# Patient Record
Sex: FEMALE | Race: WHITE
[De-identification: names, ages, dates, MRNs, and addresses within clinical notes are randomized per-mention and may not be internally consistent; named-entity substitution may affect disease eponyms.]

---

## 2018-12-23 ENCOUNTER — HOSPITAL ENCOUNTER (OUTPATIENT)
Dept: HOSPITAL 41 - JD.ED | Age: 83
Setting detail: OBSERVATION
LOS: 4 days | Discharge: NURSING, REHAB OR LONG-TERM CARE | End: 2018-12-27
Payer: MEDICARE

## 2018-12-23 DIAGNOSIS — K59.00: ICD-10-CM

## 2018-12-23 DIAGNOSIS — H54.7: ICD-10-CM

## 2018-12-23 DIAGNOSIS — I48.0: ICD-10-CM

## 2018-12-23 DIAGNOSIS — E78.5: ICD-10-CM

## 2018-12-23 DIAGNOSIS — N18.3: ICD-10-CM

## 2018-12-23 DIAGNOSIS — M54.9: ICD-10-CM

## 2018-12-23 DIAGNOSIS — T46.0X1A: Primary | ICD-10-CM

## 2018-12-23 DIAGNOSIS — R42: ICD-10-CM

## 2018-12-23 DIAGNOSIS — G89.29: ICD-10-CM

## 2018-12-23 DIAGNOSIS — E66.9: ICD-10-CM

## 2018-12-23 DIAGNOSIS — D64.89: ICD-10-CM

## 2018-12-23 DIAGNOSIS — R11.0: ICD-10-CM

## 2018-12-23 DIAGNOSIS — R53.1: ICD-10-CM

## 2018-12-23 DIAGNOSIS — Z79.82: ICD-10-CM

## 2018-12-23 DIAGNOSIS — N28.9: ICD-10-CM

## 2018-12-23 DIAGNOSIS — Z79.899: ICD-10-CM

## 2018-12-23 DIAGNOSIS — I13.0: ICD-10-CM

## 2018-12-23 DIAGNOSIS — Z79.01: ICD-10-CM

## 2018-12-23 DIAGNOSIS — M19.019: ICD-10-CM

## 2018-12-23 DIAGNOSIS — E83.42: ICD-10-CM

## 2018-12-23 DIAGNOSIS — I50.9: ICD-10-CM

## 2018-12-23 DIAGNOSIS — R60.9: ICD-10-CM

## 2018-12-23 DIAGNOSIS — I65.23: ICD-10-CM

## 2018-12-23 PROCEDURE — 82272 OCCULT BLD FECES 1-3 TESTS: CPT

## 2018-12-23 PROCEDURE — 81001 URINALYSIS AUTO W/SCOPE: CPT

## 2018-12-23 PROCEDURE — 73090 X-RAY EXAM OF FOREARM: CPT

## 2018-12-23 PROCEDURE — 96376 TX/PRO/DX INJ SAME DRUG ADON: CPT

## 2018-12-23 PROCEDURE — 84443 ASSAY THYROID STIM HORMONE: CPT

## 2018-12-23 PROCEDURE — 97165 OT EVAL LOW COMPLEX 30 MIN: CPT

## 2018-12-23 PROCEDURE — 80048 BASIC METABOLIC PNL TOTAL CA: CPT

## 2018-12-23 PROCEDURE — C9113 INJ PANTOPRAZOLE SODIUM, VIA: HCPCS

## 2018-12-23 PROCEDURE — 84439 ASSAY OF FREE THYROXINE: CPT

## 2018-12-23 PROCEDURE — G0378 HOSPITAL OBSERVATION PER HR: HCPCS

## 2018-12-23 PROCEDURE — 36415 COLL VENOUS BLD VENIPUNCTURE: CPT

## 2018-12-23 PROCEDURE — 51702 INSERT TEMP BLADDER CATH: CPT

## 2018-12-23 PROCEDURE — 97110 THERAPEUTIC EXERCISES: CPT

## 2018-12-23 PROCEDURE — 93306 TTE W/DOPPLER COMPLETE: CPT

## 2018-12-23 PROCEDURE — 85025 COMPLETE CBC W/AUTO DIFF WBC: CPT

## 2018-12-23 PROCEDURE — 80299 QUANTITATIVE ASSAY DRUG: CPT

## 2018-12-23 PROCEDURE — 96375 TX/PRO/DX INJ NEW DRUG ADDON: CPT

## 2018-12-23 PROCEDURE — 97530 THERAPEUTIC ACTIVITIES: CPT

## 2018-12-23 PROCEDURE — 97161 PT EVAL LOW COMPLEX 20 MIN: CPT

## 2018-12-23 PROCEDURE — 82306 VITAMIN D 25 HYDROXY: CPT

## 2018-12-23 PROCEDURE — 97116 GAIT TRAINING THERAPY: CPT

## 2018-12-23 PROCEDURE — 80053 COMPREHEN METABOLIC PANEL: CPT

## 2018-12-23 PROCEDURE — 82553 CREATINE MB FRACTION: CPT

## 2018-12-23 PROCEDURE — 87086 URINE CULTURE/COLONY COUNT: CPT

## 2018-12-23 PROCEDURE — 93880 EXTRACRANIAL BILAT STUDY: CPT

## 2018-12-23 PROCEDURE — 80162 ASSAY OF DIGOXIN TOTAL: CPT

## 2018-12-23 PROCEDURE — 93005 ELECTROCARDIOGRAM TRACING: CPT

## 2018-12-23 PROCEDURE — 83880 ASSAY OF NATRIURETIC PEPTIDE: CPT

## 2018-12-23 PROCEDURE — 83735 ASSAY OF MAGNESIUM: CPT

## 2018-12-23 PROCEDURE — 83540 ASSAY OF IRON: CPT

## 2018-12-23 PROCEDURE — 99285 EMERGENCY DEPT VISIT HI MDM: CPT

## 2018-12-23 PROCEDURE — 96374 THER/PROPH/DIAG INJ IV PUSH: CPT

## 2018-12-23 PROCEDURE — 84484 ASSAY OF TROPONIN QUANT: CPT

## 2018-12-23 PROCEDURE — 71045 X-RAY EXAM CHEST 1 VIEW: CPT

## 2018-12-23 PROCEDURE — 84466 ASSAY OF TRANSFERRIN: CPT

## 2018-12-23 NOTE — EDM.PDOC
ED HPI GENERAL MEDICAL PROBLEM





- General


Chief Complaint: Syncope


Stated Complaint: SYNCOPE/POSS UTI


Time Seen by Provider: 12/23/18 16:01


Source of Information: Reports: Patient, Family


History Limitations: Reports: No Limitations





- History of Present Illness


INITIAL COMMENTS - FREE TEXT/NARRATIVE: 





The patient presents with dizziness.  She describes it as being lightheaded in 

nature and not vertigo.  She says it is worse when she gets up to move.  She 

felt like her heart was racing this morning.  She was recently diagnosed with A-

fib with RVR here in the ER and she was sent to [facility].  She was just 

released on the 14th.  She saw Dr Horta on Friday.  He did a UA and she had 

some WBCs but she had no symptoms.  She currently has no symptoms such as 

dysuria, frequency or urgency.  She has nausea but no vomiting or abdominal 

pain.  She has no fever, chills, cough, chest pain or shortness of breath.  She 

says she had 10/10 bilateral arm pain.  She did not fall or injure her arms.  

She has chronic shoulder pain.  She has no numbness or weakness in her arms.  

She did take an ultram and her pain is better.


Onset: Gradual


Duration: Day(s):


Location: Reports: Upper Extremity, Left, Upper Extremity, Right


Quality: Reports: Sharp


Severity: Moderate


Improves with: Reports: Immobilization


Worsens with: Reports: Movement


Associated Symptoms: Reports: Nausea/Vomiting.  Denies: Chest Pain, Cough, Fever

/Chills, Headaches, Shortness of Breath


  ** Bilateral Shoulder


Pain Score (Numeric/FACES): 10





- Related Data


 Allergies











Allergy/AdvReac Type Severity Reaction Status Date / Time


 


codeine Allergy  Rash Verified 12/09/18 12:27


 


iodine Allergy  Rash Verified 12/09/18 12:27


 


metoprolol [From Toprol XL] Allergy  swelling Verified 12/09/18 12:26





   of lips  


 


naproxen [From Naprelan] Allergy  Rash Verified 12/09/18 12:27


 


prednisone Allergy  Other Verified 12/09/18 11:58


 


propoxyphene Allergy  urine Verified 12/09/18 12:27





   retention  











Home Meds: 


 Home Meds





Acetaminophen [Tylenol] 325 mg PO Q6HR PRN 12/23/18 [History]


Allopurinol [Zyloprim] 100 mg PO BID 12/23/18 [History]


Amiodarone HCl 400 mg PO DAILY 12/23/18 [History]


Apixaban [Eliquis] 1 tab PO BID 12/23/18 [History]


Ascorbic Acid 1 tab PO DAILY 12/23/18 [History]


Aspirin 81 mg PO DAILY 12/23/18 [History]


Bumetanide [Bumex] 1 tab PO BID 12/23/18 [History]


Digoxin 125 mcg PO DAILY 12/23/18 [History]


Diltiazem HCl [Diltiazem ER] 1 cap PO DAILY 12/23/18 [History]


Fosinopril Sodium 0.5 tab PO DAILY 12/23/18 [History]


Loratadine 1 tab PO DAILY 12/23/18 [History]


Multivitamin [Multi-Vitamin Daily] 1 tab PO DAILY 12/23/18 [History]


Niacin 1 tab PO DAILY 12/23/18 [History]


Simvastatin 1 tab PO DAILY 12/23/18 [History]


Vitamin E 1 tab PO DAILY 12/23/18 [History]


traMADol [Ultram] 1 tab PO Q6HR PRN 12/23/18 [History]











Past Medical History


HEENT History: Reports: Impaired Vision


Cardiovascular History: Reports: Afib, Arrhythmia, Heart Failure, High 

Cholesterol, Hypertension


Respiratory History: Reports: None


Gastrointestinal History: Reports: None


OB/GYN History: Reports: Pregnancy


Musculoskeletal History: Reports: Back Pain, Chronic


Other Musculoskeletal History: bilateral shoulder pain


Endocrine/Metabolic History: Reports: Obesity/BMI 30+


Other Endocrine/Metabolic History: gout





Social & Family History





- Family History


Family Medical History: Noncontributory





- Tobacco Use


Smoking Status *Q: Never Smoker





- Caffeine Use


Caffeine Use: Reports: Tea





- Recreational Drug Use


Recreational Drug Use: No





ED ROS GENERAL





- Review of Systems


Review Of Systems: See Below


Constitutional: Reports: No Symptoms


HEENT: Reports: No Symptoms


Respiratory: Reports: No Symptoms


Cardiovascular: Reports: Lightheadedness.  Denies: Chest Pain


Endocrine: Reports: No Symptoms


GI/Abdominal: Reports: Nausea.  Denies: Abdominal Pain, Vomiting


: Reports: No Symptoms


Musculoskeletal: Reports: Other (Bilateral arm pain)





ED EXAM, GENERAL





- Physical Exam


Exam: See Below


Exam Limited By: No Limitations


General Appearance: Alert, No Apparent Distress


Ears: Normal External Exam


Nose: Normal Inspection


Head: Atraumatic, Normocephalic


Neck: Normal Inspection


Respiratory/Chest: No Respiratory Distress, Decreased Breath Sounds


Cardiovascular: No Murmur, Irregularly Irregular, Other (edema in her legs)


GI/Abdominal: Soft, Non-Tender, No Organomegaly, No Mass


Extremities: Pedal Edema, Other (Mild pain upon palpation to both forearms and 

upper arms)


Neurological: Alert, Oriented, No Motor/Sensory Deficits





Course





- Vital Signs


Last Recorded V/S: 


 Last Vital Signs











Temp  97.9 F   12/23/18 16:01


 


Pulse  72   12/23/18 16:01


 


Resp  16   12/23/18 16:01


 


BP  139/94 H  12/23/18 16:01


 


Pulse Ox  99   12/23/18 16:01














- Orders/Labs/Meds


Orders: 


 Active Orders 24 hr











 Category Date Time Status


 


 Cardiac Monitoring [RC] .AS DIRECTED Care  12/23/18 16:24 Active


 


 EKG Documentation Completion [RC] STAT Care  12/23/18 16:25 Active


 


 Peripheral IV Care [RC] .AS DIRECTED Care  12/23/18 16:25 Active


 


 CXR [Chest 1V Frontal] [CR] Stat Exams  12/23/18 17:47 Taken


 


 DIGOXIN [CHEM] Stat Lab  12/23/18 16:40 Received


 


 Sodium Chloride 0.9% [Saline Flush] Med  12/23/18 16:24 Active





 10 ml FLUSH ASDIRECTED PRN   


 


 Peripheral IV Insertion Adult [OM.PC] Stat Oth  12/23/18 16:24 Ordered








 Medication Orders





Sodium Chloride (Saline Flush)  10 ml FLUSH ASDIRECTED PRN


   PRN Reason: Keep Vein Open


   Last Admin: 12/23/18 17:00  Dose: 10 ml








Labs: 


 Laboratory Tests











  12/23/18 12/23/18 12/23/18 Range/Units





  16:40 16:40 16:40 


 


WBC  8.89    (3.98-10.04)  K/mm3


 


RBC  3.13 L    (3.98-5.22)  M/mm3


 


Hgb  10.0 L    (11.2-15.7)  gm/L


 


Hct  30.6 L    (34.1-44.9)  %


 


MCV  97.8 H    (79.4-94.8)  fl


 


MCH  31.9    (25.6-32.2)  pg


 


MCHC  32.7    (32.2-35.5)  g/dl


 


RDW Std Deviation  45.8    (36.4-46.3)  fL


 


Plt Count  257    (182-369)  K/mm3


 


MPV  10.6    (9.4-12.3)  fl


 


Neut % (Auto)  74.6 H    (34.0-71.1)  %


 


Lymph % (Auto)  15.4 L    (19.3-51.7)  %


 


Mono % (Auto)  8.9    (4.7-12.5)  %


 


Eos % (Auto)  0.6 L    (0.7-5.8)  


 


Baso % (Auto)  0.1    (0.1-1.2)  %


 


Neut # (Auto)  6.63 H    (1.56-6.13)  K/mm3


 


Lymph # (Auto)  1.37    (1.18-3.74)  K/mm3


 


Mono # (Auto)  0.79 H    (0.24-0.36)  K/mm3


 


Eos # (Auto)  0.05    (0.04-0.36)  K/mm3


 


Baso # (Auto)  0.01    (0.01-0.08)  K/mm3


 


Sodium   130 L   (136-145)  mEq/L


 


Potassium   4.5   (3.5-5.1)  mEq/L


 


Chloride   94 L   ()  mEq/L


 


Carbon Dioxide   30   (21-32)  mEq/L


 


Anion Gap   10.5   (5-15)  


 


BUN   48 H   (7-18)  mg/dL


 


Creatinine   1.8 H   (0.55-1.02)  mg/dL


 


Est Cr Clr Drug Dosing   19.59   mL/min


 


Estimated GFR (MDRD)   27   (>60)  mL/min


 


BUN/Creatinine Ratio   26.7 H   (14-18)  


 


Glucose   134 H   ()  mg/dL


 


Calcium   9.7   (8.5-10.1)  mg/dL


 


Magnesium   1.5 L   (1.8-2.4)  mg/dl


 


Total Bilirubin   0.5   (0.2-1.0)  mg/dL


 


AST   20   (15-37)  U/L


 


ALT   20   (14-59)  U/L


 


Alkaline Phosphatase   67   ()  U/L


 


Troponin I   < 0.017   (0.00-0.056)  ng/mL


 


NT-Pro-B Natriuret Pep    5299 H  (0-450)  pg/mL


 


Total Protein   6.1 L   (6.4-8.2)  g/dl


 


Albumin   3.6   (3.4-5.0)  g/dl


 


Globulin   2.5   gm/dL


 


Albumin/Globulin Ratio   1.4   (1-2)  


 


Urine Color     (Yellow)  


 


Urine Appearance     (Clear)  


 


Urine pH     (5.0-8.0)  


 


Ur Specific Gravity     (1.005-1.030)  


 


Urine Protein     (Negative)  


 


Urine Glucose (UA)     (Negative)  


 


Urine Ketones     (Negative)  


 


Urine Occult Blood     (Negative)  


 


Urine Nitrite     (Negative)  


 


Urine Bilirubin     (Negative)  


 


Urine Urobilinogen     (0.2-1.0)  


 


Ur Leukocyte Esterase     (Negative)  


 


Urine RBC     (0-5)  /hpf


 


Urine WBC     (0-5)  /hpf


 


Ur Epithelial Cells     (0-5)  /hpf


 


Urine Bacteria     (FEW)  /hpf


 


Urine Mucus     (FEW)  /hpf














  12/23/18 Range/Units





  17:15 


 


WBC   (3.98-10.04)  K/mm3


 


RBC   (3.98-5.22)  M/mm3


 


Hgb   (11.2-15.7)  gm/L


 


Hct   (34.1-44.9)  %


 


MCV   (79.4-94.8)  fl


 


MCH   (25.6-32.2)  pg


 


MCHC   (32.2-35.5)  g/dl


 


RDW Std Deviation   (36.4-46.3)  fL


 


Plt Count   (182-369)  K/mm3


 


MPV   (9.4-12.3)  fl


 


Neut % (Auto)   (34.0-71.1)  %


 


Lymph % (Auto)   (19.3-51.7)  %


 


Mono % (Auto)   (4.7-12.5)  %


 


Eos % (Auto)   (0.7-5.8)  


 


Baso % (Auto)   (0.1-1.2)  %


 


Neut # (Auto)   (1.56-6.13)  K/mm3


 


Lymph # (Auto)   (1.18-3.74)  K/mm3


 


Mono # (Auto)   (0.24-0.36)  K/mm3


 


Eos # (Auto)   (0.04-0.36)  K/mm3


 


Baso # (Auto)   (0.01-0.08)  K/mm3


 


Sodium   (136-145)  mEq/L


 


Potassium   (3.5-5.1)  mEq/L


 


Chloride   ()  mEq/L


 


Carbon Dioxide   (21-32)  mEq/L


 


Anion Gap   (5-15)  


 


BUN   (7-18)  mg/dL


 


Creatinine   (0.55-1.02)  mg/dL


 


Est Cr Clr Drug Dosing   mL/min


 


Estimated GFR (MDRD)   (>60)  mL/min


 


BUN/Creatinine Ratio   (14-18)  


 


Glucose   ()  mg/dL


 


Calcium   (8.5-10.1)  mg/dL


 


Magnesium   (1.8-2.4)  mg/dl


 


Total Bilirubin   (0.2-1.0)  mg/dL


 


AST   (15-37)  U/L


 


ALT   (14-59)  U/L


 


Alkaline Phosphatase   ()  U/L


 


Troponin I   (0.00-0.056)  ng/mL


 


NT-Pro-B Natriuret Pep   (0-450)  pg/mL


 


Total Protein   (6.4-8.2)  g/dl


 


Albumin   (3.4-5.0)  g/dl


 


Globulin   gm/dL


 


Albumin/Globulin Ratio   (1-2)  


 


Urine Color  Yellow  (Yellow)  


 


Urine Appearance  Clear  (Clear)  


 


Urine pH  5.5  (5.0-8.0)  


 


Ur Specific Gravity  1.010  (1.005-1.030)  


 


Urine Protein  Negative  (Negative)  


 


Urine Glucose (UA)  Negative  (Negative)  


 


Urine Ketones  Negative  (Negative)  


 


Urine Occult Blood  Negative  (Negative)  


 


Urine Nitrite  Negative  (Negative)  


 


Urine Bilirubin  Negative  (Negative)  


 


Urine Urobilinogen  0.2  (0.2-1.0)  


 


Ur Leukocyte Esterase  Negative  (Negative)  


 


Urine RBC  Not seen  (0-5)  /hpf


 


Urine WBC  0-5  (0-5)  /hpf


 


Ur Epithelial Cells  0-5  (0-5)  /hpf


 


Urine Bacteria  Few  (FEW)  /hpf


 


Urine Mucus  Not seen  (FEW)  /hpf











Meds: 


Medications











Generic Name Dose Route Start Last Admin





  Trade Name Freq  PRN Reason Stop Dose Admin


 


Sodium Chloride  10 ml  12/23/18 16:24  12/23/18 17:00





  Saline Flush  FLUSH   10 ml





  ASDIRECTED PRN   Administration





  Keep Vein Open   





     





     





     














Discontinued Medications














Generic Name Dose Route Start Last Admin





  Trade Name Freq  PRN Reason Stop Dose Admin


 


Ondansetron HCl  4 mg  12/23/18 16:35  12/23/18 17:00





  Zofran  IVPUSH  12/23/18 16:36  4 mg





  ONETIME ONE   Administration





     





     





     





     














- Re-Assessments/Exams


Free Text/Narrative Re-Assessment/Exam: 





12/23/18 16:39


I ordered an IV saline lock, EKG, labs and UA.  I will also give her some 

zofran.


12/23/18 18:04


Her EKG shows atrial fib with no acute changes.  Her Hgb was low at 10.  Her Na 

was low at 130.  Her glucose was elevated at 136.  Her magnesium was low at 

1.5.  Her troponin was negative.  Her BNP was 5,299.  That is elevated from the 

last time she was here.  She is on bumex 1mg.  She does have edema in her legs.

  I will get a CXR.


12/23/18 19:04


Her CXR looks good.  I do not feel comfortable sending her home. She is dizzy 

and now her BP did go down to 97 systolic.  She is on amiodarone, digoxin and 

cardizem.  These could all be contributing to her problems.  I called Dr Dela Cruz 

and he agreed to the admission.  He did want a digoxin level.  I did ordered x-

rays of her forearms and ultram 50mg by mouth.





Departure





- Departure


Time of Disposition: 19:10


Disposition: Refer to Observation


Condition: Good


Clinical Impression: 


 Lightheaded, Renal insufficiency





Heart failure


Qualifiers:


 Heart failure type: unspecified Heart failure chronicity: chronic Qualified 

Code(s): I50.9 - Heart failure, unspecified





Atrial fibrillation


Qualifiers:


 Atrial fibrillation type: chronic Qualified Code(s): I48.2 - Chronic atrial 

fibrillation





Referrals: 


Pravin Horta MD [Primary Care Provider] - 


Forms:  ED Department Discharge





- My Orders


Last 24 Hours: 


My Active Orders





12/23/18 16:24


Cardiac Monitoring [RC] .AS DIRECTED 


Sodium Chloride 0.9% [Saline Flush]   10 ml FLUSH ASDIRECTED PRN 


Peripheral IV Insertion Adult [OM.PC] Stat 





12/23/18 16:25


EKG Documentation Completion [RC] STAT 


Peripheral IV Care [RC] .AS DIRECTED 





12/23/18 16:40


DIGOXIN [CHEM] Stat 





12/23/18 17:47


CXR [Chest 1V Frontal] [CR] Stat 














- Assessment/Plan


Last 24 Hours: 


My Active Orders





12/23/18 16:24


Cardiac Monitoring [RC] .AS DIRECTED 


Sodium Chloride 0.9% [Saline Flush]   10 ml FLUSH ASDIRECTED PRN 


Peripheral IV Insertion Adult [OM.PC] Stat 





12/23/18 16:25


EKG Documentation Completion [RC] STAT 


Peripheral IV Care [RC] .AS DIRECTED 





12/23/18 16:40


DIGOXIN [CHEM] Stat 





12/23/18 17:47


CXR [Chest 1V Frontal] [CR] Stat

## 2018-12-24 RX ADMIN — TRAMADOL HYDROCHLORIDE PRN MG: 50 TABLET, FILM COATED ORAL at 13:16

## 2018-12-24 RX ADMIN — THERA TABS SCH EACH: TAB at 09:20

## 2018-12-24 RX ADMIN — TRAMADOL HYDROCHLORIDE PRN MG: 50 TABLET, FILM COATED ORAL at 21:48

## 2018-12-24 NOTE — US
Carotid ultrasound: Duplex and color flow imaging was obtained of the 

carotid arteries.

 

Comparison: No prior carotid imaging.

 

Intimal thickening seen on both sides.  Homogeneous smooth plaque 

identified within the right carotid bulb.

 

Right side:

CCA has a peak systolic velocity of 1.26 m/sec.

ICA has a peak systolic velocity of 1.78 m/sec and peak end-diastolic 

velocity of 0.29 m/sec.

ECA has a peak systolic velocity of 1.32 m/sec.

ICA/CCA ratio is 1.4.

Vertebral artery has a peak systolic velocity of 1.24.

 

 

Left side:

CCA has a peak systolic velocity of 2.07 m/sec.

ICA has a peak systolic velocity of 1.22 m/sec and peak end-diastolic 

velocity of 0.21 m/sec.

ECA has a peak systolic velocity of 0.93 m/sec.

ICA/CCA ratio is 0.6.

Vertebral artery has a peak systolic velocity of 0.91.

 

Impression:

1.  Mild amount of plaque.

2.  Velocity measurements within the right internal carotid artery 

correspond to stenosis at the lower range of 50-79%.

3.  Elevated velocity measurement within the left common carotid 

artery possibly representing nonvisualized stenosis of around 50%.

4.  Velocity measurements within the left internal carotid artery 

correspond to stenosis in the range of 1-49%.

 

Diagnostic code #3

## 2018-12-24 NOTE — CR
Right forearm: Two views of the right forearm were obtained.

 

Comparison: No previous study.

 

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 

with surrounding bony debris.  Bony structures are osteopenic.  No 

acute fracture or other abnormality is appreciated.

 

Impression:

1.  Degenerative change as noted above.  Osteopenia.

2.  Two-view right forearm study is otherwise unremarkable.

 

Diagnostic code #2

## 2018-12-24 NOTE — PCM.HP
H&P History of Present Illness





- General


Date of Service: 12/24/18


Admit Problem/Dx: 


 Admission Diagnosis/Problem





Admission Diagnosis/Problem      Lightheadedness








Source of Information: Patient, Family, Old Records, RN Notes Reviewed


History Limitations: Reports: No Limitations





- History of Present Illness


Initial Comments - Free Text/Narative: 


This is an 82 yo elderly white female with past medical hx/o Impaired Vision, 

Atrial Fibrillation on Eliquis, ASA, Cardizem, Amiodarone and Digoxin, Heart 

Failure with Unknown EF, HTN, HLD, Chronic Back Pain, Chronic Bilateral 

Shoulder Pain, Gout, CKD Stage Stage 3 and Obesity Class I who comes in with 

complaints of dizziness, lightheadedness and generalized weakness. These seem 

to have started after her recent hospitalization in [place] for treatment of 

Atrial Fibrillation with RVR. She was just released on the 14th with the 

following cardiac medications of Amiodarone, Digoxin and Cardizem CD. She was 

seen this past Friday by her PCP but no acute findings noted on her initial 

work up. She denies any signs of systemic infection. However she admits to 

having chronic shoulder pain due to OA. No report of recent trauma or falls.





Her initial work up in ED shows a CBC remarkable for RBD of 3.13, Hgb of 10, 

Hct of 30.6, MCV of 97.8, Neutrophils of 74.6%, Lymphocytes of 15.4%, and 

Eosinophils of 0.6%. Her chemistry is significant for Na of 130, Cl of 94, BUN 

of 48, Cr of 1.8, BS of 134. Her bilateral forearm x-ray report reads 

degenerative change. Osteopenia. No acute fracture or abnormality is 

appreciated. Her chest x-ray report shows no acute abnormal findings.





Patient was admitted last night for medical management of polypharmacy and 

Digoxin Toxicity. She is DNR/DNI.





  ** Bilateral Shoulder


Pain Score (Numeric/FACES): 4





- Related Data


Allergies/Adverse Reactions: 


 Allergies











Allergy/AdvReac Type Severity Reaction Status Date / Time


 


codeine Allergy  Rash Verified 12/09/18 12:27


 


iodine Allergy  Rash Verified 12/09/18 12:27


 


metoprolol [From Toprol XL] Allergy  swelling Verified 12/09/18 12:26





   of lips  


 


naproxen [From Naprelan] Allergy  Rash Verified 12/09/18 12:27


 


prednisone Allergy  Other Verified 12/09/18 11:58


 


propoxyphene Allergy  urine Verified 12/09/18 12:27





   retention  











Home Medications: 


 Home Meds





Acetaminophen [Tylenol] 325 mg PO Q6HR PRN 12/23/18 [History]


Allopurinol [Zyloprim] 100 mg PO BID 12/23/18 [History]


Amiodarone HCl 400 mg PO DAILY 12/23/18 [History]


Apixaban [Eliquis] 5 mg PO BID 12/23/18 [History]


Ascorbic Acid 500 mg PO DAILY 12/23/18 [History]


Aspirin 81 mg PO DAILY 12/23/18 [History]


Bumetanide [Bumex] 1 mg PO BID 12/23/18 [History]


Digoxin 0.125 mg PO DAILY 12/23/18 [History]


Diltiazem HCl [Diltiazem ER] 120 mg PO DAILY 12/23/18 [History]


Fosinopril Sodium 10 mg PO DAILY 12/23/18 [History]


Loratadine 10 mg PO DAILY 12/23/18 [History]


Multivitamin [Multi-Vitamin Daily] 1 tab PO DAILY 12/23/18 [History]


Niacin 250 mg PO DAILY 12/23/18 [History]


Simvastatin 20 mg PO DAILY 12/23/18 [History]


Vitamin E 400 units PO DAILY 12/23/18 [History]


traMADol [Ultram] 50 mg PO Q6HR PRN 12/23/18 [History]











Past Medical History


HEENT History: Reports: Cataract, Hard of Hearing, Impaired Vision


Cardiovascular History: Reports: Afib, Arrhythmia, Heart Failure, High 

Cholesterol, Hypertension, Syncope


Respiratory History: Reports: None


Gastrointestinal History: Reports: None


Genitourinary History: Reports: Chronic Renal Insuffiency


OB/GYN History: Reports: Pregnancy


Musculoskeletal History: Reports: Arthritis, Back Pain, Chronic, Gout, Other (

See Below)


Other Musculoskeletal History: Chronic bilateral shoulder pain


Neurological History: Reports: Vertigo


Endocrine/Metabolic History: Reports: Obesity/BMI 30+


Other Endocrine/Metabolic History: gout


Hematologic History: Reports: None


Immunologic History: Reports: None


Oncologic (Cancer) History: Reports: Other (See Below)


Other Oncologic History: potential breast cancer, recent diagnossis.


Dermatologic History: Reports: Other (See Below)


Other Dermatologic History: cellulitis to leg right





- Infectious Disease History


Infectious Disease History: Reports: Chicken Pox, Measles, Shingles





- Past Surgical History


HEENT Surgical History: Reports: Cataract Surgery


Cardiovascular Surgical History: Reports: None


Female  Surgical History: Reports: None


Endocrine Surgical History: Reports: None


Neurological Surgical History: Reports: None


Musculoskeletal Surgical History: Reports: Other (See Below)


Other Musculoskeletal Surgeries/Procedures:: knee repair


Other Oncologic Surgeries/Procedures: titanium tag in breast for marking





Social & Family History





- Family History


Family Medical History: Noncontributory





- Tobacco Use


Smoking Status *Q: Never Smoker


Second Hand Smoke Exposure: No





- Caffeine Use


Caffeine Use: Reports: Tea





- Recreational Drug Use


Recreational Drug Use: No





H&P Review of Systems





- Review of Systems:


Review Of Systems: See Below


General: Reports: Weakness.  Denies: Fever, Chills, Malaise, Fatigue


HEENT: Reports: No Symptoms


Pulmonary: Reports: Shortness of Breath


Cardiovascular: Reports: Edema, Lightheadedness.  Denies: Chest Pain, 

Palpitations, Syncope, Blood Pressure Problem


Gastrointestinal: Reports: No Symptoms


Genitourinary: Reports: No Symptoms


Musculoskeletal: Reports: Shoulder Pain


Skin: Reports: Bruising.  Denies: Cyanosis, Pallor, Diaphoresis, Pruritis, Rash

, Wound, Lesions


Psychiatric: Denies: Confusion, Depression, Anxiety, Agitation, Hallucinations


Neurological: Reports: Dizziness, Weakness, Gait Disturbance, Other.  Denies: 

Confusion, Difficulty Walking


Hematologic/Lymphatic: Reports: Easy Bruising


Immunologic: Reports: No Symptoms





Exam





- Exam


Exam: See Below





- Vital Signs


Vital Signs: 


 Last Vital Signs











Temp  36.6 C   12/24/18 04:21


 


Pulse  70   12/24/18 04:21


 


Resp  16   12/24/18 04:21


 


BP  123/52 L  12/24/18 04:21


 


Pulse Ox  92 L  12/24/18 04:21











Weight: 80.558 kg





- Exam


General: Alert, Oriented, Cooperative, Mild Distress


HEENT: Conjunctiva Clear, EACs Clear, EOMI, Mucosa Moist & Pink, Nares Patent, 

Normal Nasal Septum, Posterior Pharynx Clear, Pupils Equal, Pupils Reactive.  No

: Hearing Intact


Neck: Supple, Trachea Midline, Full Range of Motion.  No: JVD


Lungs: Clear to Auscultation, Normal Respiratory Effort


Cardiovascular: Regular Rate, Regular Rhythm


GI/Abdominal Exam: Normal Bowel Sounds, Non-Tender, No Organomegaly, No 

Distention, No Abnormal Bruit, No Mass


 (Female) Exam: Deferred


Rectal (Female) Exam: Deferred


Back Exam: Normal Inspection, Decreased Range of Motion


Extremities: Normal Inspection, Normal Range of Motion, Non-Tender, Normal 

Capillary Refill, Pedal Edema, Other (bilateral lower extremity nonpitting edema

)


Peripheral Pulses: 1+: Posterior Tibial (L), Posterior Tibial (R), Dorsalis 

Pedis (L), Dorsalis Pedis (R)


Skin: Warm, Dry, Intact


Neuro Extensive - Mental Status: Oriented x3, Normal Cognition, Memory Intact


Neuro Extensive - Motor, Sensory, Reflexes: CN II-XII Intact.  No: Abnormal Gait


Psychiatric: Alert, Normal Affect, Normal Mood





- Patient Data


Lab Results Last 24 hrs: 


 Laboratory Results - last 24 hr











  12/23/18 12/23/18 12/23/18 Range/Units





  16:40 16:40 16:40 


 


WBC  8.89    (3.98-10.04)  K/mm3


 


RBC  3.13 L    (3.98-5.22)  M/mm3


 


Hgb  10.0 L    (11.2-15.7)  gm/L


 


Hct  30.6 L    (34.1-44.9)  %


 


MCV  97.8 H    (79.4-94.8)  fl


 


MCH  31.9    (25.6-32.2)  pg


 


MCHC  32.7    (32.2-35.5)  g/dl


 


RDW Std Deviation  45.8    (36.4-46.3)  fL


 


Plt Count  257    (182-369)  K/mm3


 


MPV  10.6    (9.4-12.3)  fl


 


Neut % (Auto)  74.6 H    (34.0-71.1)  %


 


Lymph % (Auto)  15.4 L    (19.3-51.7)  %


 


Mono % (Auto)  8.9    (4.7-12.5)  %


 


Eos % (Auto)  0.6 L    (0.7-5.8)  


 


Baso % (Auto)  0.1    (0.1-1.2)  %


 


Neut # (Auto)  6.63 H    (1.56-6.13)  K/mm3


 


Lymph # (Auto)  1.37    (1.18-3.74)  K/mm3


 


Mono # (Auto)  0.79 H    (0.24-0.36)  K/mm3


 


Eos # (Auto)  0.05    (0.04-0.36)  K/mm3


 


Baso # (Auto)  0.01    (0.01-0.08)  K/mm3


 


Sodium   130 L   (136-145)  mEq/L


 


Potassium   4.5   (3.5-5.1)  mEq/L


 


Chloride   94 L   ()  mEq/L


 


Carbon Dioxide   30   (21-32)  mEq/L


 


Anion Gap   10.5   (5-15)  


 


BUN   48 H   (7-18)  mg/dL


 


Creatinine   1.8 H   (0.55-1.02)  mg/dL


 


Est Cr Clr Drug Dosing   19.59   mL/min


 


Estimated GFR (MDRD)   27   (>60)  mL/min


 


BUN/Creatinine Ratio   26.7 H   (14-18)  


 


Glucose   134 H   ()  mg/dL


 


Calcium   9.7   (8.5-10.1)  mg/dL


 


Magnesium   1.5 L   (1.8-2.4)  mg/dl


 


Total Bilirubin   0.5   (0.2-1.0)  mg/dL


 


AST   20   (15-37)  U/L


 


ALT   20   (14-59)  U/L


 


Alkaline Phosphatase   67   ()  U/L


 


Troponin I   < 0.017   (0.00-0.056)  ng/mL


 


NT-Pro-B Natriuret Pep    5299 H  (0-450)  pg/mL


 


Total Protein   6.1 L   (6.4-8.2)  g/dl


 


Albumin   3.6   (3.4-5.0)  g/dl


 


Globulin   2.5   gm/dL


 


Albumin/Globulin Ratio   1.4   (1-2)  


 


Vitamin D 25-Hydroxy     (30.0-100.0)  ng/ml


 


Free T4     (0.76-1.46)  ng/dL


 


TSH 3rd Generation     (0.358-3.74)  uIU/mL


 


Urine Color     (Yellow)  


 


Urine Appearance     (Clear)  


 


Urine pH     (5.0-8.0)  


 


Ur Specific Gravity     (1.005-1.030)  


 


Urine Protein     (Negative)  


 


Urine Glucose (UA)     (Negative)  


 


Urine Ketones     (Negative)  


 


Urine Occult Blood     (Negative)  


 


Urine Nitrite     (Negative)  


 


Urine Bilirubin     (Negative)  


 


Urine Urobilinogen     (0.2-1.0)  


 


Ur Leukocyte Esterase     (Negative)  


 


Urine RBC     (0-5)  /hpf


 


Urine WBC     (0-5)  /hpf


 


Ur Epithelial Cells     (0-5)  /hpf


 


Urine Bacteria     (FEW)  /hpf


 


Urine Mucus     (FEW)  /hpf


 


Digoxin     (0.9-2.0)  ng/mL














  12/23/18 12/23/18 12/23/18 Range/Units





  16:40 17:15 17:15 


 


WBC     (3.98-10.04)  K/mm3


 


RBC     (3.98-5.22)  M/mm3


 


Hgb     (11.2-15.7)  gm/L


 


Hct     (34.1-44.9)  %


 


MCV     (79.4-94.8)  fl


 


MCH     (25.6-32.2)  pg


 


MCHC     (32.2-35.5)  g/dl


 


RDW Std Deviation     (36.4-46.3)  fL


 


Plt Count     (182-369)  K/mm3


 


MPV     (9.4-12.3)  fl


 


Neut % (Auto)     (34.0-71.1)  %


 


Lymph % (Auto)     (19.3-51.7)  %


 


Mono % (Auto)     (4.7-12.5)  %


 


Eos % (Auto)     (0.7-5.8)  


 


Baso % (Auto)     (0.1-1.2)  %


 


Neut # (Auto)     (1.56-6.13)  K/mm3


 


Lymph # (Auto)     (1.18-3.74)  K/mm3


 


Mono # (Auto)     (0.24-0.36)  K/mm3


 


Eos # (Auto)     (0.04-0.36)  K/mm3


 


Baso # (Auto)     (0.01-0.08)  K/mm3


 


Sodium     (136-145)  mEq/L


 


Potassium     (3.5-5.1)  mEq/L


 


Chloride     ()  mEq/L


 


Carbon Dioxide     (21-32)  mEq/L


 


Anion Gap     (5-15)  


 


BUN     (7-18)  mg/dL


 


Creatinine     (0.55-1.02)  mg/dL


 


Est Cr Clr Drug Dosing     mL/min


 


Estimated GFR (MDRD)     (>60)  mL/min


 


BUN/Creatinine Ratio     (14-18)  


 


Glucose     ()  mg/dL


 


Calcium     (8.5-10.1)  mg/dL


 


Magnesium     (1.8-2.4)  mg/dl


 


Total Bilirubin     (0.2-1.0)  mg/dL


 


AST     (15-37)  U/L


 


ALT     (14-59)  U/L


 


Alkaline Phosphatase     ()  U/L


 


Troponin I     (0.00-0.056)  ng/mL


 


NT-Pro-B Natriuret Pep     (0-450)  pg/mL


 


Total Protein     (6.4-8.2)  g/dl


 


Albumin     (3.4-5.0)  g/dl


 


Globulin     gm/dL


 


Albumin/Globulin Ratio     (1-2)  


 


Vitamin D 25-Hydroxy     (30.0-100.0)  ng/ml


 


Free T4     (0.76-1.46)  ng/dL


 


TSH 3rd Generation     (0.358-3.74)  uIU/mL


 


Urine Color   Yellow  Yellow  (Yellow)  


 


Urine Appearance   Clear  Clear  (Clear)  


 


Urine pH   5.5  6.0  (5.0-8.0)  


 


Ur Specific Gravity   1.010  1.010  (1.005-1.030)  


 


Urine Protein   Negative  Negative  (Negative)  


 


Urine Glucose (UA)   Negative  Negative  (Negative)  


 


Urine Ketones   Negative  Negative  (Negative)  


 


Urine Occult Blood   Negative  Negative  (Negative)  


 


Urine Nitrite   Negative  Negative  (Negative)  


 


Urine Bilirubin   Negative  Negative  (Negative)  


 


Urine Urobilinogen   0.2  0.2  (0.2-1.0)  


 


Ur Leukocyte Esterase   Negative  Negative  (Negative)  


 


Urine RBC   Not seen  Not seen  (0-5)  /hpf


 


Urine WBC   0-5  0-5  (0-5)  /hpf


 


Ur Epithelial Cells   0-5  0-5  (0-5)  /hpf


 


Urine Bacteria   Few  Few  (FEW)  /hpf


 


Urine Mucus   Not seen  Few  (FEW)  /hpf


 


Digoxin  2.7 H    (0.9-2.0)  ng/mL














  12/23/18 12/23/18 12/24/18 Range/Units





  21:48 21:48 05:07 


 


WBC    5.75  (3.98-10.04)  K/mm3


 


RBC    2.76 L  (3.98-5.22)  M/mm3


 


Hgb    8.8 L  (11.2-15.7)  gm/L


 


Hct    27.7 L  (34.1-44.9)  %


 


MCV    100.4 H  (79.4-94.8)  fl


 


MCH    31.9  (25.6-32.2)  pg


 


MCHC    31.8 L  (32.2-35.5)  g/dl


 


RDW Std Deviation    47.0 H  (36.4-46.3)  fL


 


Plt Count    231  (182-369)  K/mm3


 


MPV    10.9  (9.4-12.3)  fl


 


Neut % (Auto)    68.2  (34.0-71.1)  %


 


Lymph % (Auto)    21.2  (19.3-51.7)  %


 


Mono % (Auto)    8.3  (4.7-12.5)  %


 


Eos % (Auto)    1.6  (0.7-5.8)  


 


Baso % (Auto)    0.2  (0.1-1.2)  %


 


Neut # (Auto)    3.92  (1.56-6.13)  K/mm3


 


Lymph # (Auto)    1.22  (1.18-3.74)  K/mm3


 


Mono # (Auto)    0.48 H  (0.24-0.36)  K/mm3


 


Eos # (Auto)    0.09  (0.04-0.36)  K/mm3


 


Baso # (Auto)    0.01  (0.01-0.08)  K/mm3


 


Sodium     (136-145)  mEq/L


 


Potassium     (3.5-5.1)  mEq/L


 


Chloride     ()  mEq/L


 


Carbon Dioxide     (21-32)  mEq/L


 


Anion Gap     (5-15)  


 


BUN     (7-18)  mg/dL


 


Creatinine     (0.55-1.02)  mg/dL


 


Est Cr Clr Drug Dosing     mL/min


 


Estimated GFR (MDRD)     (>60)  mL/min


 


BUN/Creatinine Ratio     (14-18)  


 


Glucose     ()  mg/dL


 


Calcium     (8.5-10.1)  mg/dL


 


Magnesium     (1.8-2.4)  mg/dl


 


Total Bilirubin     (0.2-1.0)  mg/dL


 


AST     (15-37)  U/L


 


ALT     (14-59)  U/L


 


Alkaline Phosphatase     ()  U/L


 


Troponin I     (0.00-0.056)  ng/mL


 


NT-Pro-B Natriuret Pep     (0-450)  pg/mL


 


Total Protein     (6.4-8.2)  g/dl


 


Albumin     (3.4-5.0)  g/dl


 


Globulin     gm/dL


 


Albumin/Globulin Ratio     (1-2)  


 


Vitamin D 25-Hydroxy   58.8   (30.0-100.0)  ng/ml


 


Free T4  1.32    (0.76-1.46)  ng/dL


 


TSH 3rd Generation  5.522 H    (0.358-3.74)  uIU/mL


 


Urine Color     (Yellow)  


 


Urine Appearance     (Clear)  


 


Urine pH     (5.0-8.0)  


 


Ur Specific Gravity     (1.005-1.030)  


 


Urine Protein     (Negative)  


 


Urine Glucose (UA)     (Negative)  


 


Urine Ketones     (Negative)  


 


Urine Occult Blood     (Negative)  


 


Urine Nitrite     (Negative)  


 


Urine Bilirubin     (Negative)  


 


Urine Urobilinogen     (0.2-1.0)  


 


Ur Leukocyte Esterase     (Negative)  


 


Urine RBC     (0-5)  /hpf


 


Urine WBC     (0-5)  /hpf


 


Ur Epithelial Cells     (0-5)  /hpf


 


Urine Bacteria     (FEW)  /hpf


 


Urine Mucus     (FEW)  /hpf


 


Digoxin     (0.9-2.0)  ng/mL











Result Diagrams: 


 12/24/18 05:07





 12/24/18 05:07





EKG INTERPRETATION


EKG Date: 12/24/18


Time: 06:09


Rhythm: Other (Sinus Rhythm)


Rate (Beats/Min): 66


Axis: Normal


P-Wave: Present


QRS: Normal


ST-T: Normal


QT: Normal


Problem List Initiated/Reviewed/Updated: Yes


Orders Last 24hrs: 


 Active Orders 24 hr











 Category Date Time Status


 


 Patient Status [ADT] Routine ADT  12/23/18 19:59 Active


 


 Cardiac Monitoring [RC] .AS DIRECTED Care  12/23/18 16:24 Inactive


 


 Cardiac Monitoring [RC] CONTINUOUS Care  12/23/18 21:28 Active


 


 Height and Weight [RC] 04 Care  12/23/18 21:27 Active


 


 Intake and Output [RC] 04,16 Care  12/23/18 21:27 Active


 


 Oxygen Therapy [RC] PRN Care  12/23/18 21:27 Active


 


 RT Aerosol Therapy [RC] ASDIRECTED Care  12/23/18 21:29 Active


 


 Up With Assistance [RC] BID Care  12/23/18 21:27 Active


 


 Up ad Jeimy [RC] BID Care  12/23/18 21:27 Active


 


 VTE/DVT Education [RC] DAILY Care  12/23/18 21:27 Active


 


 Vital Signs [RC] Q4HR Care  12/23/18 21:27 Active


 


 Consult to Case Management/ [CONS] Cons  12/23/18 21:27 Active





 Routine   


 


 Consult to Spiritual Care [CONS] Routine Cons  12/23/18 21:27 Active


 


 OT Evaluation and Treatment [CONS] Routine Cons  12/23/18 21:27 Active


 


 PT Evaluation and Treatment [CONS] Routine Cons  12/23/18 21:27 Active


 


 Heart Healthy Diet [DIET] Diet  12/23/18 Dinner Active


 


 CXR [Chest 1V Frontal] [CR] Stat Exams  12/23/18 17:47 Taken


 


 Carotid Comp [US] Routine Exams  12/24/18 07:00 Ordered


 


 Forearm 2V Lt [CR] Stat Exams  12/23/18 19:04 Taken


 


 Forearm 2V Rt [CR] Stat Exams  12/23/18 19:04 Taken


 


 AMIODARONE (CORDARONE(R)), S [REF] Stat Lab  12/23/18 21:48 Received


 


 BASIC METABOLIC PANEL,BMP [CHEM] AM Lab  12/24/18 05:07 Received


 


 BASIC METABOLIC PANEL,BMP [CHEM] AM Lab  12/25/18 05:11 Ordered


 


 BASIC METABOLIC PANEL,BMP [CHEM] AM Lab  12/26/18 05:11 Ordered


 


 BASIC METABOLIC PANEL,BMP [CHEM] AM Lab  12/27/18 05:11 Ordered


 


 CKMB [CHEM] AM Lab  12/24/18 05:07 Received


 


 CULTURE URINE [RM] Stat Lab  12/23/18 17:15 Received


 


 DIGOXIN [CHEM] AM Lab  12/25/18 05:11 Ordered


 


 MAGNESIUM [CHEM] AM Lab  12/24/18 05:07 Received


 


 MAGNESIUM [CHEM] AM Lab  12/25/18 05:11 Ordered


 


 MAGNESIUM [CHEM] AM Lab  12/26/18 05:11 Ordered


 


 MAGNESIUM [CHEM] AM Lab  12/27/18 05:11 Ordered


 


 PRO B-TYPE NATRIUR PEPT,BNPPRO [CHEM] Routine Lab  12/24/18 05:07 Received


 


 TROPONIN I [CHEM] AM Lab  12/24/18 05:07 Received


 


 Acetaminophen [Tylenol] Med  12/23/18 21:24 Active





 325 mg PO Q6HR PRN   


 


 Acetaminophen [Tylenol] Med  12/23/18 21:27 Active





 650 mg PO Q4H PRN   


 


 Acetaminophen/HYDROcodone [Norco 325-5 MG] Med  12/23/18 21:27 Active





 1 tab PO Q4H PRN   


 


 Albuterol/Ipratropium [DuoNeb 3.0-0.5 MG/3 ML] Med  12/23/18 21:27 Active





 3 ml NEB Q4H PRN   


 


 Amiodarone HCl [Amiodarone HCl] Med  12/24/18 09:00 Pending





 200 mg PO DAILY   


 


 Apixaban [Eliquis] Med  12/24/18 09:00 Pending





 1 tab PO BID   


 


 Ascorbic Acid [Ascorbic Acid] Med  12/24/18 09:00 Pending





 1 tab PO DAILY   


 


 Aspirin [Halfprin] Med  12/24/18 09:00 Active





 81 mg PO DAILY   


 


 Bisacodyl [Dulcolax] Med  12/23/18 21:27 Active





 5 mg PO DAILY PRN   


 


 Bumetanide [Bumex] Med  12/24/18 06:00 Pending





 1 tab PO BIDDIURETIC   


 


 Diltiazem HCl [Diltiazem ER] Med  12/24/18 09:00 Pending





 1 cap PO DAILY   


 


 Diltiazem [Cardizem] Med  12/23/18 21:26 Active





 5 mg IVPUSH Q4H PRN   


 


 Docusate Sodium [Colace] Med  12/23/18 21:27 Active





 100 mg PO BID PRN   


 


 Docusate Sodium/Sennosides [Senna Plus] Med  12/23/18 21:27 Active





 1 tab PO BID PRN   


 


 Fosinopril Sodium [Fosinopril Sodium] Med  12/24/18 09:00 Pending





 0.5 tab PO DAILY   


 


 HYDROmorphone [Dilaudid] Med  12/23/18 21:27 Active





 0.25 mg IVPUSH Q2H PRN   


 


 LORazepam [Ativan] Med  12/23/18 21:27 Active





 0.25 mg IV Q6H PRN   


 


 Loratadine [Loratadine] Med  12/24/18 09:00 Pending





 1 tab PO DAILY   


 


 Multivitamin Med  12/24/18 09:00 Pending





 1 tab PO DAILY   


 


 Niacin [Niacin] Med  12/24/18 09:00 Pending





 1 tab PO DAILY   


 


 Ondansetron [Zofran] Med  12/23/18 21:27 Active





 4 mg IV Q6H PRN   


 


 Pharmacy to Dose - Magnesium R [Pharmacy to Dose - Med  12/23/18 21:30 Pending





 Magnesium Replacement]   





 1 dose .XX ASDIRECTED   


 


 Pharmacy to Dose - Potassium R [Pharmacy to Dose - Med  12/23/18 21:30 Pending





 Potassium Replacement]   





 1 dose .XX ASDIRECTED   


 


 Polyethylene Glycol 3350 [MiraLAX] Med  12/23/18 21:27 Active





 17 gm PO DAILY PRN   


 


 Promethazine [Phenergan] 6.25 mg Med  12/23/18 21:27 Active





 Sodium Chloride 0.9% [Normal Saline] 50 ml   





 IV Q6H   


 


 Sodium Chloride 0.9% [Saline Flush] Med  12/23/18 16:24 Active





 10 ml FLUSH ASDIRECTED PRN   


 


 Vitamin E [Vitamin E] Med  12/24/18 09:00 Pending





 1 tab PO DAILY   


 


 hydrALAZINE [Apresoline] Med  12/23/18 21:26 Active





 10 mg IVPUSH Q4H PRN   


 


 hydroCHLOROthiazide Med  12/24/18 06:00 Active





 12.5 mg PO BIDDIURETIC   


 


 traMADol [Ultram] Med  12/23/18 21:24 Pending





 1 tab PO Q6HR PRN   


 


 Peripheral IV Insertion Adult [OM.PC] Stat Oth  12/23/18 16:24 Ordered


 


 Resuscitation Status Routine Resus Stat  12/23/18 20:42 Ordered








 Medication Orders





Acetaminophen (Tylenol)  325 mg PO Q6HR PRN


   PRN Reason: Fever Greater Than 101


Acetaminophen (Tylenol)  650 mg PO Q4H PRN


   PRN Reason: Pain (Mild 1-3)/fever


Hydrocodone Bitart/Acetaminophen (Norco 325-5 Mg)  1 tab PO Q4H PRN


   PRN Reason: Pain (moderate 4-6)


   Last Admin: 12/24/18 06:17  Dose: 1 tab


Albuterol/Ipratropium (Duoneb 3.0-0.5 Mg/3 Ml)  3 ml NEB Q4H PRN


   PRN Reason: Shortness Of Breath/wheezing


Aspirin (Halfprin)  81 mg PO DAILY CHON


Bisacodyl (Dulcolax)  5 mg PO DAILY PRN


   PRN Reason: Constipation


Diltiazem HCl (Cardizem)  5 mg IVPUSH Q4H PRN


   PRN Reason: tachycardiac


Docusate Sodium (Colace)  100 mg PO BID PRN


   PRN Reason: Constipation


Hydralazine HCl (Apresoline)  10 mg IVPUSH Q4H PRN


   PRN Reason: Hypertension


Hydrochlorothiazide (Hydrochlorothiazide)  12.5 mg PO BIDDIURETIC CHON


   Last Admin: 12/24/18 06:12  Dose: 12.5 mg


Hydromorphone HCl (Dilaudid)  0.25 mg IVPUSH Q2H PRN


   PRN Reason: Pain (severe 7-10)


Promethazine HCl 6.25 mg/ (Sodium Chloride)  50.25 mls @ 100 mls/hr IV Q6H PRN


   PRN Reason: Nausea/Vomiting


Lorazepam (Ativan)  0.25 mg IV Q6H PRN


   PRN Reason: Anxiety


Magnesium Sulfate (Pharmacy To Dose - Magnesium Replacement)  1 dose .XX 

ASDIRECTED CHON


Non-Formulary Medication (Amiodarone Hcl [Amiodarone Hcl])  200 mg PO DAILY CHON


Non-Formulary Medication (Apixaban [Eliquis])  1 tab PO BID CHON


Non-Formulary Medication (Ascorbic Acid [Ascorbic Acid])  1 tab PO DAILY CHON


Non-Formulary Medication (Diltiazem Hcl [Diltiazem Er])  1 cap PO DAILY HCON


Non-Formulary Medication (Fosinopril Sodium [Fosinopril Sodium])  0.5 tab PO 

DAILY CHON


Non-Formulary Medication (Loratadine [Loratadine])  1 tab PO DAILY CHON


Non-Formulary Medication (Multivitamin)  1 tab PO DAILY CHON


Non-Formulary Medication (Niacin [Niacin])  1 tab PO DAILY CHON


Non-Formulary Medication (Tramadol [Ultram])  1 tab PO Q6HR PRN


   PRN Reason: Pain


Non-Formulary Medication (Vitamin E [Vitamin E])  1 tab PO DAILY CHON


Non-Formulary Medication (Bumetanide [Bumex])  1 tab PO BIDDIURETIC CHON


Ondansetron HCl (Zofran)  4 mg IV Q6H PRN


   PRN Reason: Nausea/Vomiting


Polyethylene Glycol (Miralax)  17 gm PO DAILY PRN


   PRN Reason: Constipation


Potassium Chloride (Pharmacy To Dose - Potassium Replacement)  1 dose .XX 

ASDIRECTED [facility]


Senna/Docusate Sodium (Senna Plus)  1 tab PO BID PRN


   PRN Reason: Constipation


Sodium Chloride (Saline Flush)  10 ml FLUSH ASDIRECTED PRN


   PRN Reason: Keep Vein Open


   Last Admin: 12/23/18 17:00  Dose: 10 ml








Assessment/Plan Comment:: 


Assessment/Plan:


Acute:


Digoxin Toxicity


   - Symptomatic: Lightheadedness, Generalized Weakness, Nausea 


   - Digoxin Level 2.7 (2 upper limit of normal)


   - Risk Factors: Polypharmacy: Amdiodarone, Cardizem and Statin


   - Hold Digoxin and Cut down Amiodarone dose to 200 mg po daily


   - Repeat Digoxin level 





Peripheral Edema


   - Likely 2/2 vascular insufficiency


   - She is on CCB which is contributory to her edema


   - Also explains why her ProBNP level is elevated


   - Compression stockings


   - Thyroid Panel: FT4 (normal), TSH 5.522 (slightly elevated)





Elevated ProBNP level


   - Carries a hx/o Heart Failure with Unknown EF


   - Will obtain recent 2D echo report from [facility]


   - ProBNP 5299 --> 4407


   - Serial CE x 2 so far are negative


   - 2L fluid daily restriction


   - She is not in acute heart failure; no signs of central oveload


   - Her chest x-ray is unremarkable and she is clear on pulmonary auscultation





Hypomagnesemia


   - Mg 1.5 --> 1.8


   - 2/2 inadequate intake 


   - Replete and monitor





Oliguric Renal Failure


   - Acute on Chronic


   - Baseline GFR is 47 (stage 3); now 27 (Stage 4)


   - Consider KVO at 20 cc/hr


   - Consider holding off diuretic today





Anemia


   - Baseline Hgb 11.7 12/09/2018


   - Hgb now 10 --> 8.8 (suspect hemodilution); she recieved some fluid in ED


   - R/o GI bleed since she is on ASA and Eliquis


   - Fecal occult test and Iron Panel


   - Continue to monitor





Chronic:


Impaired Vision


Atrial Fibrillation on Eliquis, ASA, Cardizem, Amiodarone and Digoxin


Heart Failure with Unknown EF


HTN


HLD


Back Pain


Bilateral Shoulder Pain


Gout


CKD Stage Stage 3


Obesity Class I





Plan:


Admitted to [facility] last night. She feels better today than yesterday.


Resume Home Meds


Routine AM labs


Amiodarone level


Carotid Studies today


Dietary consult for weight management


Heart healthy and 2L fluid restriction


Obtain 2D echo or recent cardiac work up report from [place]


PT/OT consult


DVT/GI PPx


SW/CM for d/c planning


Additional orders as above


Continue HHS upon discharge


Code status: DNR

## 2018-12-24 NOTE — CR
Left forearm: Two views of the left forearm were obtained.

 

Comparison: No prior left forearm study.

 

Bony structures are osteopenic.  Degenerative change is noted at the 

carpometacarpal joint of the thumb.  Small calcification which is 

well-corticated is seen off the medial epicondyle of the distal 

humerus which is old.  No fracture or other abnormality is 

appreciated.

 

Impression:

1.  Osteopenia.  Other incidental findings.

2.  Nothing acute is seen.

 

Diagnostic code #2

## 2018-12-24 NOTE — CR
Chest: Portable view of the chest was obtained.

 

Comparison: Prior chest x-ray of 12/09/18.

 

Heart size and mediastinum are within normal limits for portable 

technique.  Lungs are clear.  Bony structures are grossly intact.

 

Impression:

1.  Nothing acute is seen.

 

Diagnostic code #1

## 2018-12-25 RX ADMIN — BUMETANIDE SCH: 1 TABLET ORAL at 03:16

## 2018-12-25 RX ADMIN — BUMETANIDE SCH MG: 1 TABLET ORAL at 15:17

## 2018-12-25 RX ADMIN — DILTIAZEM HYDROCHLORIDE SCH MG: 120 CAPSULE, COATED, EXTENDED RELEASE ORAL at 09:44

## 2018-12-25 RX ADMIN — BUMETANIDE SCH: 1 TABLET ORAL at 03:17

## 2018-12-25 RX ADMIN — DILTIAZEM HYDROCHLORIDE SCH: 120 CAPSULE, COATED, EXTENDED RELEASE ORAL at 03:16

## 2018-12-25 RX ADMIN — THERA TABS SCH EACH: TAB at 09:44

## 2018-12-25 RX ADMIN — NIACIN SCH MG: 500 TABLET, FILM COATED, EXTENDED RELEASE ORAL at 09:44

## 2018-12-25 RX ADMIN — TRAMADOL HYDROCHLORIDE PRN MG: 50 TABLET, FILM COATED ORAL at 09:59

## 2018-12-25 RX ADMIN — TRAMADOL HYDROCHLORIDE PRN MG: 50 TABLET, FILM COATED ORAL at 19:59

## 2018-12-25 RX ADMIN — BUMETANIDE SCH MG: 1 TABLET ORAL at 06:31

## 2018-12-25 NOTE — PCM.PN
- General Info


Date of Service: 12/25/18


Admission Dx/Problem (Free Text): 


 Admission Diagnosis/Problem





Admission Diagnosis/Problem      Lightheadedness








Subjective Update: 


Follow Up





Functional Status: Reports: Pain Controlled, Tolerating Diet, Ambulating, 

Urinating.  Denies: New Symptoms





- Review of Systems


General: Denies: Fever, Weakness, Fatigue, Malaise, Chills


HEENT: Reports: No Symptoms


Pulmonary: Denies: Shortness of Breath


Cardiovascular: Reports: Edema.  Denies: Chest Pain, Palpitations, Dyspnea on 

Exertion, Lightheadedness


Gastrointestinal: Denies: Abdominal Pain, Decreased Appetite, Nausea, Vomiting


Genitourinary: Reports: No Symptoms


Musculoskeletal: Reports: No Symptoms


Skin: Denies: Cyanosis


Neurological: Reports: Gait Disturbance.  Denies: Confusion, Dizziness, Syncope

, Difficulty Walking, Weakness


Psychiatric: Denies: Confusion, Mood Lability, Anxiety, Agitation, 

Hallucinations


Systems Review Comment:: 


No overnight or acute issues. She feels pretty good. She has no complaints 

except she has not had a bowel movement. She normally goes everyday. Her 

Digoxin level is now 3, slightly up since admission. No report of abnormal 

heart rate via telemetry.








- Patient Data


Vitals - Most Recent: 


 Last Vital Signs











Temp  36.6 C   12/25/18 05:53


 


Pulse  74   12/25/18 05:53


 


Resp  16   12/25/18 05:53


 


BP  110/82   12/25/18 05:53


 


Pulse Ox  90 L  12/25/18 05:53











Weight - Most Recent: 80.467 kg


I&O - Last 24 Hours: 


 Intake & Output











 12/24/18 12/25/18 12/25/18





 22:59 06:59 14:59


 


Intake Total 520 400 


 


Output Total 800 750 


 


Balance -280 -350 











Lab Results Last 24 Hours: 


 Laboratory Results - last 24 hr











  12/24/18 12/25/18 Range/Units





  05:07 05:09 


 


Sodium   137  (136-145)  mEq/L


 


Potassium   4.9  (3.5-5.1)  mEq/L


 


Chloride   98  ()  mEq/L


 


Carbon Dioxide   33 H  (21-32)  mEq/L


 


Anion Gap   10.9  (5-15)  


 


BUN   37 H  (7-18)  mg/dL


 


Creatinine   1.7 H  (0.55-1.02)  mg/dL


 


Est Cr Clr Drug Dosing   20.73  mL/min


 


Estimated GFR (MDRD)   29  (>60)  mL/min


 


BUN/Creatinine Ratio   21.8 H  (14-18)  


 


Glucose   118 H  ()  mg/dL


 


Calcium   9.9  (8.5-10.1)  mg/dL


 


Magnesium   2.1  (1.8-2.4)  mg/dl


 


Iron  54   ()  ug/dL


 


TIBC  351   (100-400)  ug/dL


 


% Saturation  15 L   (20-55)  %


 


Transferrin  281   (202-364)  mg/dL


 


Digoxin   3.0 H  (0.9-2.0)  ng/mL











Med Orders - Current: 


 Current Medications





Acetaminophen (Tylenol)  325 mg PO Q6HR PRN


   PRN Reason: Fever Greater Than 101


Acetaminophen (Tylenol)  650 mg PO Q4H PRN


   PRN Reason: Pain (Mild 1-3)/fever


Hydrocodone Bitart/Acetaminophen (Norco 325-5 Mg)  1 tab PO Q4H PRN


   PRN Reason: Pain (moderate 4-6)


   Last Admin: 12/24/18 06:17 Dose:  1 tab


Albuterol/Ipratropium (Duoneb 3.0-0.5 Mg/3 Ml)  3 ml NEB Q4H PRN


   PRN Reason: Shortness Of Breath/wheezing


Amiodarone HCl (Cordarone)  400 mg PO DAILY [facility]


   Stop: 01/08/19 09:01


Amiodarone HCl (Cordarone)  200 mg PO DAILY [facility]


Apixaban (Eliquis)  2.5 mg PO BID [facility]


   Last Admin: 12/24/18 21:47 Dose:  2.5 mg


Ascorbic Acid (Vitamin C)  500 mg PO DAILY [facility]


   Last Admin: 12/24/18 09:20 Dose:  500 mg


Aspirin (Halfprin)  81 mg PO DAILY [facility]


   Last Admin: 12/24/18 09:20 Dose:  81 mg


Benzonatate (Tessalon Perles)  100 mg PO TID PRN


   PRN Reason: Cough


Bisacodyl (Dulcolax)  5 mg PO DAILY PRN


   PRN Reason: Constipation


Bumetanide (Bumex)  1 mg PO BIDDIURETIC [facility]


   Last Admin: 12/25/18 06:31 Dose:  1 mg


Diltiazem HCl (Cardizem)  5 mg IVPUSH Q4H PRN


   PRN Reason: tachycardiac


Diltiazem HCl (Cardizem Cd)  120 mg PO DAILY [facility]


   Last Admin: 12/25/18 03:16 Dose:  Not Given


Diphenhydramine HCl (Benadryl)  25 mg PO Q6H PRN


   PRN Reason: Allergies


Docusate Sodium (Colace)  100 mg PO BID PRN


   PRN Reason: Constipation


Furosemide (Lasix)  40 mg PO BIDDIURETIC [facility]


   Last Admin: 12/25/18 06:31 Dose:  40 mg


Hydralazine HCl (Apresoline)  10 mg IVPUSH Q4H PRN


   PRN Reason: Hypertension


Hydrochlorothiazide (Hydrochlorothiazide)  12.5 mg PO BIDDIURETIC [facility]


Hydromorphone HCl (Dilaudid)  0.25 mg IVPUSH Q2H PRN


   PRN Reason: Pain (severe 7-10)


Promethazine HCl 6.25 mg/ (Sodium Chloride)  50.25 mls @ 100 mls/hr IV Q6H PRN


   PRN Reason: Nausea/Vomiting


Loratadine (Claritin)  10 mg PO DAILY [facility]


   Last Admin: 12/25/18 03:16 Dose:  Not Given


Lorazepam (Ativan)  0.25 mg IV Q6H PRN


   PRN Reason: Anxiety


Magnesium Sulfate (Pharmacy To Dose - Magnesium Replacement)  1 dose .XX 

ASDIRECTED [facility]


Multivitamins (Thera)  1 each PO DAILY [facility]


   Last Admin: 12/24/18 09:20 Dose:  1 each


Niacin (Niaspan)  500 mg PO DAILY [facility]


Fosinopril Sodium [ Fosinopril Sodium] 20mg **Pt Own**  0.5 tab PO DAILY [facility]


Ondansetron HCl (Zofran)  4 mg IV Q6H PRN


   PRN Reason: Nausea/Vomiting


Polyethylene Glycol (Miralax)  17 gm PO DAILY PRN


   PRN Reason: Constipation


Potassium Chloride (Pharmacy To Dose - Potassium Replacement)  1 dose .XX 

ASDIRECTED [facility]


Senna/Docusate Sodium (Senna Plus)  1 tab PO BID PRN


   PRN Reason: Constipation


Sodium Chloride (Saline Flush)  10 ml FLUSH ASDIRECTED PRN


   PRN Reason: Keep Vein Open


   Last Admin: 12/23/18 17:00 Dose:  10 ml


Tramadol HCl (Ultram)  50 mg PO Q6H PRN


   PRN Reason: PAIN


   Last Admin: 12/24/18 21:48 Dose:  50 mg





Discontinued Medications





Bumetanide (Bumex)  0.5 mg IVPUSH ONETIME ONE


   Stop: 12/23/18 21:42


   Last Admin: 12/23/18 22:31 Dose:  0.5 mg


Hydrochlorothiazide (Hydrochlorothiazide)  12.5 mg PO BIDDIURETIC [facility]


   Last Admin: 12/24/18 06:12 Dose:  12.5 mg


Magnesium Oxide (Magnesium Oxide)  400 mg PO ONETIME ONE


   Stop: 12/23/18 22:01


   Last Admin: 12/23/18 22:30 Dose:  400 mg


Amiodarone Hcl [ Amiodarone Hcl] 200 Mg**Pt Own**  400 mg PO DAILY [facility]


Non-Formulary Medication (Bumetanide [Bumex])  1 tab PO BID [facility]


Non-Formulary Medication (Fosinopril Sodium [Fosinopril Sodium])  0.5 tab PO 

DAILY [facility]


Non-Formulary Medication (Niacin [Niacin])  1 tab PO DAILY [facility]


Non-Formulary Medication (Vitamin E [Vitamin E])  1 tab PO DAILY [facility]


Non-Formulary Medication (Cetirizine Hcl [All Day Allergy])  10 mg PO DAILY [facility]


   PRN Reason: Allergies


Ondansetron HCl (Zofran)  4 mg IVPUSH ONETIME ONE


   Stop: 12/23/18 16:36


   Last Admin: 12/23/18 17:00 Dose:  4 mg


Tramadol HCl (Ultram)  50 mg PO ONETIME ONE


   Stop: 12/23/18 19:05


   Last Admin: 12/23/18 19:26 Dose:  50 mg











- Exam


General: Alert, Cooperative, No Acute Distress


HEENT: Pupils Equal, Pupils Reactive, EOMI, Mucous Membr. Moist/Pink


Neck: Supple


Lungs: Clear to Auscultation, Normal Respiratory Effort


Cardiovascular: Regular Rate, Regular Rhythm


GI/Abdominal Exam: Normal Bowel Sounds, Soft, Non-Tender, No Organomegaly, No 

Distention, No Abnormal Bruit


 (Female) Exam: Deferred


Back Exam: Normal Inspection, Decreased Range of Motion


Extremities: Normal Inspection, Normal Range of Motion, Non-Tender, No Pedal 

Edema, Normal Capillary Refill, Other (bilateral lower extremity pitting edema 2

+)


Peripheral Pulses: 1+: Dorsalis Pedis (L), Dorsalis Pedis (R)


Skin: Warm, Dry, Intact


Neurological: No New Focal Deficit.  No: Normal Gait


Psy/Mental Status: Alert, Normal Affect, Normal Mood





- Problem List Review


Problem List Initiated/Reviewed/Updated: Yes





- My Orders


Last 24 Hours: 


My Active Orders





01/09/19 09:00


Amiodarone [Cordarone]   200 mg PO DAILY 





12/24/18 09:00


Ascorbic Acid [Vitamin C]   500 mg PO DAILY 


Aspirin [Halfprin]   81 mg PO DAILY 


Diltiazem [Cardizem CD]   120 mg PO DAILY 


Loratadine [Claritin]   10 mg PO DAILY 


Multivitamins,Therapeutic [Thera]   1 each PO DAILY 





12/24/18 09:10


Hemoccult [OCCULT BLOOD DIAGNOSTIC] [OP] Routine 





12/24/18 09:14


Consult to Dietary [Consult to Dietician] [CONS] Routine 





12/24/18 13:00


Apixaban [Eliquis]   2.5 mg PO BID 





12/24/18 13:15


traMADol [Ultram]   50 mg PO Q6H PRN 





12/24/18 18:29


Benzonatate [Tessalon Perles]   100 mg PO TID PRN 


diphenhydrAMINE [Benadryl]   25 mg PO Q6H PRN 





12/24/18 Lunch


Fluid Restriction [DIET] 





12/25/18 04:24


Amiodarone [Cordarone]   400 mg PO DAILY 





12/25/18 06:00


Furosemide [Lasix]   40 mg PO BIDDIURETIC 





12/25/18 07:53


CBC WITH AUTO DIFF [HEME] Urgent 





12/25/18 07:54


Echo Comp wo Cont [US] Routine 





12/25/18 09:00


Niacin [Niaspan]   500 mg PO DAILY 





12/26/18 05:11


BASIC METABOLIC PANEL,BMP [CHEM] AM 


CBC WITH AUTO DIFF [HEME] AM 


MAGNESIUM [CHEM] AM 





12/27/18 05:11


BASIC METABOLIC PANEL,BMP [CHEM] AM 


CBC WITH AUTO DIFF [HEME] AM 


MAGNESIUM [CHEM] AM 





12/27/18 09:30


hydroCHLOROthiazide   12.5 mg PO BIDDIURETIC 





12/28/18 05:11


CBC WITH AUTO DIFF [HEME] AM 





12/28/18 09:00


Fosinopril Sodium [Fosinopril Sodium]   0.5 tab PO DAILY 














- Plan


Plan:: 


Assessment/Plan:


Acute:


Digoxin Toxicity


   - Symptomatic: Lightheadedness, Generalized Weakness, Nausea 


   - Digoxin Level 2.7 (2 upper limit of normal) --> 3


   - Risk Factors: Polypharmacy: Amiodarone, Cardizem and Statin


   - Continue to Hold Digoxin 





Peripheral Edema


   - Likely 2/2 vascular insufficiency


   - She is on CCB which is contributory to her edema


   - Also explains why her ProBNP level is elevated


   - Compression stockings/ACE Bandage


   - Thyroid Panel: FT4 (normal), TSH 5.522 (slightly elevated)





Elevated ProBNP level


   - Carries a hx/o Heart Failure with Unknown EF


   - Will obtain recent 2D echo report from [place]; 2D echo in AM


   - ProBNP 5299 --> 4407


   - Serial CE x 2 so far are negative


   - 2L fluid daily restriction


   - Bumex and low dose Hctz


   - She is not in acute heart failure; no signs of central overload


   - Her chest x-ray is unremarkable and she is clear on pulmonary auscultation





Oliguric Renal Failure, Unchanged


   - Acute on Chronic; this could be here new baseline


   - Baseline GFR is 47 (stage 3); now 27 (Stage 4)


   - Consider KVO at 20 cc/hr





Constipation


   - Bisacodyl RS PRN


   - Colace 1 tab po BID for Stool Softener





Resolved:


Hypomagnesemia


   - Mg 1.5 --> 1.8 --> 2.1


   - 2/2 inadequate intake 


   - Replete and monitor





Anemia


   - Baseline Hgb 11.7 12/09/2018


   - Hgb now 10 --> 8.8 (suspect hemodilution)--> 9.2; she received some fluid 

in ED


   - R/o GI bleed since she is on ASA and Eliquis


   - Fecal occult test and Iron Panel


   - Continue to monitor





Chronic:


Impaired Vision


Atrial Fibrillation on Eliquis, HR, controlled ASA, Cardizem, Amiodarone and 

Digoxin. Plan to just keep her on Amiodarone and Cardizem


Heart Failure with Unknown EF


HTN


HLD


Back Pain


Bilateral Shoulder Pain


Gout


Carotid Stenosis


CKD Stage Stage 3


Obesity Class I





Plan:


She is clinically stable


Routine AM labs


Amiodarone level- pending


Continue PT/OT 


DVT/GI PPx


SW/CM for d/c planning


Additional orders as above


Recommend SNF/NH


Code status: DNR





LOS > 96 hrs pending placement. With patient's permission, discussed lab results

, clinical progress, and treatment plan with her son. Informed Luciano, her digoxin 

level still continues to go up even though we stopped her dose since admission. 

Patient seems to be doing just fine with Cardizem and Amiodarone. Her son  

agreed we may just have to keep mom on those 2 drugs to eliminate the need for 

digoxin for toxicity concern.

## 2018-12-26 RX ADMIN — NIACIN SCH MG: 500 TABLET, FILM COATED, EXTENDED RELEASE ORAL at 08:18

## 2018-12-26 RX ADMIN — TRAMADOL HYDROCHLORIDE PRN MG: 50 TABLET, FILM COATED ORAL at 12:01

## 2018-12-26 RX ADMIN — DILTIAZEM HYDROCHLORIDE SCH MG: 120 CAPSULE, COATED, EXTENDED RELEASE ORAL at 08:14

## 2018-12-26 RX ADMIN — THERA TABS SCH EACH: TAB at 08:18

## 2018-12-26 RX ADMIN — BUMETANIDE SCH MG: 1 TABLET ORAL at 13:09

## 2018-12-26 RX ADMIN — BUMETANIDE SCH MG: 1 TABLET ORAL at 06:51

## 2018-12-26 NOTE — PCM.PN
- General Info


Date of Service: 12/26/18


Admission Dx/Problem (Free Text): 


 Admission Diagnosis/Problem





Admission Diagnosis/Problem      Lightheadedness








Subjective Update: 


Follow Up





Functional Status: Reports: Pain Controlled, Tolerating Diet, Ambulating, 

Urinating.  Denies: New Symptoms





- Review of Systems


General: Denies: Fever, Weakness, Fatigue, Malaise, Chills


HEENT: Reports: No Symptoms


Pulmonary: Denies: Shortness of Breath


Cardiovascular: Denies: Chest Pain, Palpitations, Lightheadedness


Gastrointestinal: Denies: Abdominal Pain, Nausea, Vomiting


Genitourinary: Reports: No Symptoms


Musculoskeletal: Denies: Neck Pain


Skin: Denies: Cyanosis, Mottled, Diaphoresis


Neurological: Denies: Dizziness, Syncope, Trouble Speaking, Difficulty Walking, 

Weakness


Psychiatric: Denies: Depression, Anxiety, Agitation, Hallucinations


Systems Review Comment:: 


No overnight or acute issues. She slept well last night. She has no complaints 

this AM. 








- Patient Data


Vitals - Most Recent: 


 Last Vital Signs











Temp  36.4 C   12/26/18 08:13


 


Pulse  81   12/26/18 08:14


 


Resp  20   12/26/18 08:11


 


BP  154/72 H  12/26/18 08:14


 


Pulse Ox  95   12/26/18 08:12











Weight - Most Recent: 79.061 kg


I&O - Last 24 Hours: 


 Intake & Output











 12/25/18 12/26/18 12/26/18





 22:59 06:59 14:59


 


Intake Total 400 200 120


 


Output Total 1450 1600 


 


Balance -1050 -1400 120











Lab Results Last 24 Hours: 


 Laboratory Results - last 24 hr











  12/26/18 12/26/18 12/26/18 Range/Units





  05:33 05:33 05:33 


 


WBC   5.31   (3.98-10.04)  K/mm3


 


RBC   2.74 L   (3.98-5.22)  M/mm3


 


Hgb   8.6 L   (11.2-15.7)  gm/L


 


Hct   27.7 L   (34.1-44.9)  %


 


MCV   101.1 H   (79.4-94.8)  fl


 


MCH   31.4   (25.6-32.2)  pg


 


MCHC   31.0 L   (32.2-35.5)  g/dl


 


RDW Std Deviation   47.3 H   (36.4-46.3)  fL


 


Plt Count   202   (182-369)  K/mm3


 


MPV   10.6   (9.4-12.3)  fl


 


Neut % (Auto)   66.8   (34.0-71.1)  %


 


Lymph % (Auto)   20.7   (19.3-51.7)  %


 


Mono % (Auto)   10.4   (4.7-12.5)  %


 


Eos % (Auto)   1.7   (0.7-5.8)  


 


Baso % (Auto)   0.2   (0.1-1.2)  %


 


Neut # (Auto)   3.55   (1.56-6.13)  K/mm3


 


Lymph # (Auto)   1.10 L   (1.18-3.74)  K/mm3


 


Mono # (Auto)   0.55 H   (0.24-0.36)  K/mm3


 


Eos # (Auto)   0.09   (0.04-0.36)  K/mm3


 


Baso # (Auto)   0.01   (0.01-0.08)  K/mm3


 


Sodium  138    (136-145)  mEq/L


 


Potassium  4.1    (3.5-5.1)  mEq/L


 


Chloride  99    ()  mEq/L


 


Carbon Dioxide  33 H    (21-32)  mEq/L


 


Anion Gap  10.1    (5-15)  


 


BUN  31 H    (7-18)  mg/dL


 


Creatinine  1.5 H    (0.55-1.02)  mg/dL


 


Est Cr Clr Drug Dosing  23.50    mL/min


 


Estimated GFR (MDRD)  33    (>60)  mL/min


 


BUN/Creatinine Ratio  20.7 H    (14-18)  


 


Glucose  121 H    ()  mg/dL


 


Calcium  9.7    (8.5-10.1)  mg/dL


 


Magnesium  1.8    (1.8-2.4)  mg/dl


 


Iron     ()  ug/dL


 


TIBC     (100-400)  ug/dL


 


% Saturation     (20-55)  %


 


Transferrin     (202-364)  mg/dL


 


NT-Pro-B Natriuret Pep    695 H  (0-450)  pg/mL














  12/26/18 Range/Units





  05:33 


 


WBC   (3.98-10.04)  K/mm3


 


RBC   (3.98-5.22)  M/mm3


 


Hgb   (11.2-15.7)  gm/L


 


Hct   (34.1-44.9)  %


 


MCV   (79.4-94.8)  fl


 


MCH   (25.6-32.2)  pg


 


MCHC   (32.2-35.5)  g/dl


 


RDW Std Deviation   (36.4-46.3)  fL


 


Plt Count   (182-369)  K/mm3


 


MPV   (9.4-12.3)  fl


 


Neut % (Auto)   (34.0-71.1)  %


 


Lymph % (Auto)   (19.3-51.7)  %


 


Mono % (Auto)   (4.7-12.5)  %


 


Eos % (Auto)   (0.7-5.8)  


 


Baso % (Auto)   (0.1-1.2)  %


 


Neut # (Auto)   (1.56-6.13)  K/mm3


 


Lymph # (Auto)   (1.18-3.74)  K/mm3


 


Mono # (Auto)   (0.24-0.36)  K/mm3


 


Eos # (Auto)   (0.04-0.36)  K/mm3


 


Baso # (Auto)   (0.01-0.08)  K/mm3


 


Sodium   (136-145)  mEq/L


 


Potassium   (3.5-5.1)  mEq/L


 


Chloride   ()  mEq/L


 


Carbon Dioxide   (21-32)  mEq/L


 


Anion Gap   (5-15)  


 


BUN   (7-18)  mg/dL


 


Creatinine   (0.55-1.02)  mg/dL


 


Est Cr Clr Drug Dosing   mL/min


 


Estimated GFR (MDRD)   (>60)  mL/min


 


BUN/Creatinine Ratio   (14-18)  


 


Glucose   ()  mg/dL


 


Calcium   (8.5-10.1)  mg/dL


 


Magnesium   (1.8-2.4)  mg/dl


 


Iron  30 L  ()  ug/dL


 


TIBC  378  (100-400)  ug/dL


 


% Saturation  8 L  (20-55)  %


 


Transferrin  302  (202-364)  mg/dL


 


NT-Pro-B Natriuret Pep   (0-450)  pg/mL











[facility] Results Last 24 Hours: 


 Microbiology











 12/26/18 10:00 Stool Occult Blood ([facility]) - Final





 Stool / Feces    POSITIVE OCCULT BLOOD


 


 12/23/18 17:15 Urine Culture - Final





 Urine, Clean Catch    NO GROWTH AFTER 2 DAYS











Med Orders - Current: 


 Current Medications





Acetaminophen (Tylenol)  325 mg PO Q6HR PRN


   PRN Reason: Fever Greater Than 101


Acetaminophen (Tylenol)  650 mg PO Q4H PRN


   PRN Reason: Pain (Mild 1-3)/fever


Hydrocodone Bitart/Acetaminophen (Norco 325-5 Mg)  1 tab PO Q4H PRN


   PRN Reason: Pain (moderate 4-6)


   Last Admin: 12/24/18 06:17 Dose:  1 tab


Albuterol/Ipratropium (Duoneb 3.0-0.5 Mg/3 Ml)  3 ml NEB Q4H PRN


   PRN Reason: Shortness Of Breath/wheezing


Amiodarone HCl (Cordarone)  200 mg PO DAILY [facility]


Apixaban (Eliquis)  2.5 mg PO BID [facility]


   Last Admin: 12/26/18 08:17 Dose:  2.5 mg


Ascorbic Acid (Vitamin C)  500 mg PO DAILY [facility]


   Last Admin: 12/26/18 08:18 Dose:  500 mg


Aspirin (Halfprin)  81 mg PO DAILY [facility]


   Last Admin: 12/26/18 08:17 Dose:  81 mg


Benzonatate (Tessalon Perles)  100 mg PO TID PRN


   PRN Reason: Cough


Bisacodyl (Dulcolax)  5 mg PO DAILY PRN


   PRN Reason: Constipation


   Last Admin: 12/25/18 19:59 Dose:  5 mg


Bisacodyl (Dulcolax)  10 mg RECTAL BID PRN


   PRN Reason: Constipation


   Last Admin: 12/26/18 08:20 Dose:  10 mg


Bumetanide (Bumex)  1 mg PO BIDDIURETIC [facility]


   Last Admin: 12/26/18 06:51 Dose:  1 mg


Diltiazem HCl (Cardizem)  5 mg IVPUSH Q4H PRN


   PRN Reason: tachycardiac


Diltiazem HCl (Cardizem Cd)  120 mg PO DAILY [facility]


   Last Admin: 12/26/18 08:14 Dose:  120 mg


Diphenhydramine HCl (Benadryl)  25 mg PO Q6H PRN


   PRN Reason: Allergies


Docusate Sodium (Colace)  100 mg PO BID [facility]


   Last Admin: 12/26/18 08:17 Dose:  Not Given


Hydralazine HCl (Apresoline)  10 mg IVPUSH Q4H PRN


   PRN Reason: Hypertension


Hydrochlorothiazide (Hydrochlorothiazide)  12.5 mg PO BIDDIURETIC [facility]


Hydromorphone HCl (Dilaudid)  0.25 mg IVPUSH Q2H PRN


   PRN Reason: Pain (severe 7-10)


Promethazine HCl 6.25 mg/ (Sodium Chloride)  50.25 mls @ 100 mls/hr IV Q6H PRN


   PRN Reason: Nausea/Vomiting


Loratadine (Claritin)  10 mg PO DAILY [facility]


   Last Admin: 12/26/18 08:16 Dose:  10 mg


Lorazepam (Ativan)  0.25 mg IV Q6H PRN


   PRN Reason: Anxiety


Magnesium Sulfate (Pharmacy To Dose - Magnesium Replacement)  1 dose .XX 

ASDIRECTED [facility]


Multivitamins (Thera)  1 each PO DAILY [facility]


   Last Admin: 12/26/18 08:18 Dose:  1 each


Niacin (Niaspan)  500 mg PO DAILY [facility]


   Last Admin: 12/26/18 08:18 Dose:  500 mg


Fosinopril Sodium [ Fosinopril Sodium] 20mg **Pt Own**  0.5 tab PO DAILY [facility]


Ondansetron HCl (Zofran)  4 mg IV Q6H PRN


   PRN Reason: Nausea/Vomiting


Polyethylene Glycol (Miralax)  17 gm PO DAILY PRN


   PRN Reason: Constipation


Potassium Chloride (Pharmacy To Dose - Potassium Replacement)  1 dose .XX 

ASDIRECTED [facility]


Senna/Docusate Sodium (Senna Plus)  1 tab PO BID PRN


   PRN Reason: Constipation


   Last Admin: 12/26/18 08:18 Dose:  1 tab


Sodium Chloride (Saline Flush)  10 ml FLUSH ASDIRECTED PRN


   PRN Reason: Keep Vein Open


   Last Admin: 12/23/18 17:00 Dose:  10 ml


Tramadol HCl (Ultram)  50 mg PO Q6H PRN


   PRN Reason: PAIN


   Last Admin: 12/25/18 19:59 Dose:  50 mg





Discontinued Medications





Amiodarone HCl (Cordarone)  400 mg PO DAILY [facility]


   Stop: 01/08/19 09:01


Bumetanide (Bumex)  0.5 mg IVPUSH ONETIME ONE


   Stop: 12/23/18 21:42


   Last Admin: 12/23/18 22:31 Dose:  0.5 mg


Docusate Sodium (Colace)  100 mg PO BID PRN


   PRN Reason: Constipation


Furosemide (Lasix)  40 mg PO BIDDIURETIC [facility]


   Last Admin: 12/25/18 06:31 Dose:  40 mg


Hydrochlorothiazide (Hydrochlorothiazide)  12.5 mg PO BIDDIURETIC [facility]


   Last Admin: 12/24/18 06:12 Dose:  12.5 mg


Magnesium Oxide (Magnesium Oxide)  400 mg PO ONETIME ONE


   Stop: 12/23/18 22:01


   Last Admin: 12/23/18 22:30 Dose:  400 mg


Amiodarone Hcl [ Amiodarone Hcl] 200 Mg**Pt Own**  400 mg PO DAILY [facility]


   Last Admin: 12/25/18 08:21 Dose:  Not Given


Non-Formulary Medication (Bumetanide [Bumex])  1 tab PO BID [facility]


Non-Formulary Medication (Fosinopril Sodium [Fosinopril Sodium])  0.5 tab PO 

DAILY [facility]


Non-Formulary Medication (Niacin [Niacin])  1 tab PO DAILY [facility]


Non-Formulary Medication (Vitamin E [Vitamin E])  1 tab PO DAILY [facility]


Non-Formulary Medication (Cetirizine Hcl [All Day Allergy])  10 mg PO DAILY PRN


   PRN Reason: Allergies


Ondansetron HCl (Zofran)  4 mg IVPUSH ONETIME ONE


   Stop: 12/23/18 16:36


   Last Admin: 12/23/18 17:00 Dose:  4 mg


Tramadol HCl (Ultram)  50 mg PO ONETIME ONE


   Stop: 12/23/18 19:05


   Last Admin: 12/23/18 19:26 Dose:  50 mg











- Exam


General: Alert, Cooperative, No Acute Distress


HEENT: Pupils Equal, Pupils Reactive, EOMI, Mucous Membr. Moist/Pink


Neck: Supple


Lungs: Clear to Auscultation, Normal Respiratory Effort


Cardiovascular: Regular Rate, Regular Rhythm


GI/Abdominal Exam: Normal Bowel Sounds, Soft, Non-Tender, No Organomegaly, No 

Distention, No Abnormal Bruit, No Mass


 (Female) Exam: Deferred


Back Exam: Normal Inspection, Decreased Range of Motion


Extremities: Normal Inspection, Normal Range of Motion, Non-Tender, No Pedal 

Edema, Normal Capillary Refill, Pedal Edema, Other (bilateral lower extremity 

edema 1+ (improved))


Peripheral Pulses: 2+: Dorsalis Pedis (L), Dorsalis Pedis (R)


Skin: Warm, Dry, Intact


Neurological: No New Focal Deficit


Psy/Mental Status: Alert, Normal Affect, Normal Mood





- Problem List Review


Problem List Initiated/Reviewed/Updated: Yes





- My Orders


Last 24 Hours: 


My Active Orders





01/09/19 09:00


Amiodarone [Cordarone]   200 mg PO DAILY 





12/25/18 10:19


ACE Bandage [Elastic Wrap] [OM.PC] Routine 





12/26/18 07:54


Echo Comp wo Cont [US] Routine 





12/27/18 05:11


BASIC METABOLIC PANEL,BMP [CHEM] AM 


CBC WITH AUTO DIFF [HEME] AM 


MAGNESIUM [CHEM] AM 





12/27/18 09:30


hydroCHLOROthiazide   12.5 mg PO BIDDIURETIC 





12/28/18 05:11


CBC WITH AUTO DIFF [HEME] AM 





12/28/18 09:00


Fosinopril Sodium [Fosinopril Sodium]   0.5 tab PO DAILY 














- Plan


Plan:: 


Assessment/Plan:


Acute:


Digoxin Toxicity


   - Symptomatic: Lightheadedness, Generalized Weakness, Nausea 


   - Digoxin Level 2.7 (2 upper limit of normal) --> 30; repeat level in AM


   - Risk Factors: Polypharmacy: Amiodarone, Cardizem and Statin


   - Continue to Hold Digoxin 





Peripheral Edema, Improved


   - Likely 2/2 vascular insufficiency


   - She is on CCB which is contributory to her edema


   - Also explains why her ProBNP level is elevated


   - Compression stockings/ACE Bandage


   - Thyroid Panel: FT4 (normal), TSH 5.522 (slightly elevated)





Elevated ProBNP level


   - Carries a hx/o Heart Failure with Unknown EF


   - Will obtain recent 2D echo report from [place]; 2D echo in AM


   - ProBNP 5299 --> 4407 --> 695


   - Serial CE x 2 so far are negative


   - 2L fluid daily restriction


   - Bumex and low dose Hctz


   - She is not in acute heart failure; no signs of central overload


   - Her chest x-ray is unremarkable and she is clear on pulmonary auscultation





Oliguric Renal Failure, Improved


   - Acute on Chronic; this could be here new baseline


   - Baseline GFR is 47 (stage 3); now 27 (Stage 4)


   - Consider KVO at 20 cc/hr





Anemia


   - Baseline Hgb 11.7 12/09/2018


   - Hgb now 10 --> 8.8 (suspect hemodilution)--> 9.2; she received some fluid 

in ED; 8.6 today


   - R/o GI bleed since she is on ASA and Eliquis


   - Fecal occult test and Iron Panel (mildly abnormal)


   - Continue to monitor





Resolved:


Hypomagnesemia


   - Mg 1.5 --> 1.8 --> 2.1


   - 2/2 inadequate intake 


   - Replete and monitor





Constipation


   - Bisacodyl RS PRN


   - Colace 1 tab po BID for Stool Softener





Chronic:


Impaired Vision


Atrial Fibrillation on Eliquis, HR, controlled ASA, Cardizem, Amiodarone and 

Digoxin. Plan to just keep her on Amiodarone and Cardizem


Heart Failure with Unknown EF


HTN


HLD


Back Pain


Bilateral Shoulder Pain


Gout


Carotid Stenosis


CKD Stage Stage 3


Obesity Class I





Plan:


She remains clinically stable


Routine AM labs


2D echo today


Amiodarone level- pending


Continue PT/OT 


DVT/GI PPx


SW/CM for d/c planning


Additional orders as above


Recommend SNF/NH


Code status: DNR





LOS > 96 hrs pending placement. With patient's permission, clinical progress, 

and treatment plan with her son.

## 2018-12-27 RX ADMIN — NIACIN SCH MG: 500 TABLET, FILM COATED, EXTENDED RELEASE ORAL at 09:29

## 2018-12-27 RX ADMIN — THERA TABS SCH EACH: TAB at 09:29

## 2018-12-27 RX ADMIN — TRAMADOL HYDROCHLORIDE PRN MG: 50 TABLET, FILM COATED ORAL at 09:48

## 2018-12-27 RX ADMIN — DILTIAZEM HYDROCHLORIDE SCH MG: 120 CAPSULE, COATED, EXTENDED RELEASE ORAL at 09:32

## 2018-12-27 NOTE — [id number]
**Discharge Summary





- Hospital Course


HPI Initial Comments: 


This is an 82 yo elderly white female with past medical hx/o Impaired Vision, 

Atrial Fibrillation on Eliquis, ASA, Cardizem, Amiodarone and Digoxin, Heart 

Failure with Unknown EF, HTN, HLD, Chronic Back Pain, Chronic Bilateral 

Shoulder Pain, Gout, CKD Stage Stage 3 and Obesity Class I who comes in with 

complaints of dizziness, lightheadedness and generalized weakness. These seem 

to have started after her recent hospitalization in [place] for treatment of 

Atrial Fibrillation with RVR. She was just released on the 14th with the 

following cardiac medications of Amiodarone, Digoxin and Cardizem CD. She was 

seen this past Friday by her PCP but no acute findings noted on her initial 

work up. She denies any signs of systemic infection. However she admits to 

having chronic shoulder pain due to OA. No report of recent trauma or falls.





Her initial work up in ED shows a CBC remarkable for RBD of 3.13, Hgb of 10, 

Hct of 30.6, MCV of 97.8, Neutrophils of 74.6%, Lymphocytes of 15.4%, and 

Eosinophils of 0.6%. Her chemistry is significant for Na of 130, Cl of 94, BUN 

of 48, Cr of 1.8, BS of 134. Her bilateral forearm x-ray report reads 

degenerative change. Osteopenia. No acute fracture or abnormality is 

appreciated. Her chest x-ray report shows no acute abnormal findings.





Patient was admitted last night for medical management of polypharmacy and 

Digoxin Toxicity. She is DNR/DNI.





Diagnosis: Stroke: No





- Discharge Data


Discharge Date: 12/27/18 (Admit date: 12/23/18)


Discharge Disposition: DC/Tfer to SNF 03


Condition: Good





- Discharge Diagnosis/Problem(s)


(1) Anemia


SNOMED Code(s): 019730336


   ICD Code: D64.9 - ANEMIA, UNSPECIFIED   Status: Acute   Priority: High   

Current Visit: Yes   


Qualifiers: 


   Anemia type: other cause   Other causes of anemia: other cause, not 

classified   Qualified Code(s): D64.89 - Other specified anemias   





(2) Hypomagnesemia


SNOMED Code(s): 133515039


   ICD Code: E83.42 - HYPOMAGNESEMIA   Status: Acute   Priority: High   Current 

Visit: Yes   





(3) Lightheaded


SNOMED Code(s): 808747183


   ICD Code: R42 - DIZZINESS AND GIDDINESS   Status: Acute   Priority: High   

Current Visit: Yes   





(4) Atrial fibrillation


SNOMED Code(s): 22571679


   ICD Code: I48.91 - UNSPECIFIED ATRIAL FIBRILLATION   Status: Chronic   

Priority: High   Current Visit: Yes   


Qualifiers: 


   Atrial fibrillation type: paroxysmal   Qualified Code(s): I48.0 - Paroxysmal 

atrial fibrillation   





(5) Peripheral edema


SNOMED Code(s): 177992642


   ICD Code: R60.9 - EDEMA, UNSPECIFIED   Status: Chronic   Priority: High   

Current Visit: Yes   





(6) Renal insufficiency


SNOMED Code(s): 243421493, 361743940


   ICD Code: N28.9 - DISORDER OF KIDNEY AND URETER, UNSPECIFIED   Status: 

Chronic   Priority: High   Current Visit: Yes   





(7) Digoxin toxicity


SNOMED Code(s): 69634965


   ICD Code: T46.0X1A - POISONING BY CARDI-STIM GLYCOS/DRUG SIMLAR ACT, ACC, 

INIT   Status: Resolved   Priority: High   Current Visit: Yes   


Qualifiers: 


   Encounter type: initial encounter   Injury intent: accidental or 

unintentional   Qualified Code(s): T46.0X1A - Poisoning by cardiac-stimulant 

glycosides and drugs of similar action, accidental (unintentional), initial 

encounter   





- Patient Summary/Data


Consults: 


 Consultations





12/23/18 21:27


Consult to Case Management/ [CONS] Routine 


Consult to Spiritual Care [CONS] Routine 


OT Evaluation and Treatment [CONS] Routine 


PT Evaluation and Treatment [CONS] Routine 





12/24/18 09:14


Consult to Dietary [Consult to Dietician] [CONS] Routine 











Labs Pending at D/C: 


None 





Recommended Follow-up Testing/Procedures: 


Follow-up with Dr. Penn at appointment scheduled today.





Follow-up with PCP within 1 week





Follow-up with general surgery/GI for follow-up with her positive occult and 

anemia.





Continue PT/OT at SNF 


Hospital Course: 


Assessment/Plan:


Acute:


Digoxin Toxicity, Resolved 


   - Symptomatic: Lightheadedness, Generalized Weakness, Nausea 


   - Digoxin Level 2.7 (2 upper limit of normal) --> 3.0; repeat level in AM 1.8


   - Risk Factors: Polypharmacy: Amiodarone, Cardizem and Statin


   - Continue to Hold Digoxin 





Peripheral Edema, Improved


   - Likely 2/2 vascular insufficiency


   - She is on CCB which is contributory to her edema


   - Also explains why her ProBNP level is elevated


   - Compression stockings/ACE Bandage


   - Thyroid Panel: FT4 (normal), TSH 5.522 (slightly elevated)





Elevated ProBNP level


   - Carries a hx/o Heart Failure with Unknown EF


   - Will obtain recent 2D echo report from [place]; 2D echo in AM


   - ProBNP 5299 --> 4407 --> 695


   - Serial CE x 2 so far are negative


   - 2L fluid daily restriction


   - Bumex and low dose Hctz


   - She is not in acute heart failure; no signs of central overload


   - Her chest x-ray is unremarkable and she is clear on pulmonary auscultation





Oliguric Renal Failure, Improved


   - Acute on Chronic; this could be here new baseline


   - Baseline GFR is 47 (stage 3); now 31 (Stage 4)


   - Consider KVO at 20 cc/hr





Anemia


   - Baseline Hgb 11.7 12/09/2018


   - Hgb now 10 --> 8.8 (suspect hemodilution)--> 9.2; she received some fluid 

in ED; 8.9 today


   - R/o GI bleed since she is on ASA and Eliquis


   - Fecal occult test (positive) and Iron Panel (mildly abnormal)


   - Continue to monitor





Hypomagnesemia


   - Mg 1.5 --> 1.8 --> 2.1-->1.7


   - 2/2 inadequate intake 


   - Replete and monitor


   - Supplemented prior to discharge





Resolved:


Constipation


   - Bisacodyl RS PRN


   - Colace 1 tab po BID for Stool Softener





Chronic:


Impaired Vision


Atrial Fibrillation on Eliquis, HR, controlled ASA, Cardizem, Amiodarone and 

Digoxin. Plan to just keep her on Amiodarone and Cardizem


Heart Failure with Unknown EF


HTN


HLD


Back Pain


Bilateral Shoulder Pain


Gout


Carotid Stenosis


CKD Stage Stage 3


Obesity Class I





Plan:


She remains clinically stable


Routine AM labs


2D echo 12/26/18


   1. LVEF, by visual estimation, is 70-75%


   2. Normal right ventricular systolic function   


   3. There is mild aortic valve sclerosis without stenosis


   4. Mild mitral valve regurgitation


   5. Mild tricuspid valve regurgitation


   6. The right ventricular systolic pressure is moderately elevated at 51.2 

mmHg.


   7. No regional wall motion abnormalities 


Amiodarone level- 1.8


Continue PT/OT 


DVT/GI PPx


SW/CM for d/c planning


Additional orders as above


Recommend SNF/NH


Code status: DNR





LOS > 96 hrs pending placement. With patient's permission, clinical progress, 

and treatment plan with her son. 





Overall Kamla did well. She came into our ED and was admitted with dizziness 

2/2 digoxin toxicity, elevated Pro-BNP, periphreal edema, and renal failure. 

Her Digoxin level was 2.7 and was held. Her digoxin level continued to trend 

down and was 1.8 prior to discharge. Dr. Dela Cruz reviewed her medications and 

discontinued her digoxin. He also changed her amiodarone level to 200mg daily. 

While here her Hgb was noted to be lower than her baseline. Fecal occult was 

obtained and was found to be positive. She was started on iron 325mg BID. This 

was discussed with patient and her son and the decision was made for her to 

follow-up with this outpatient. She should follow-up with GI/GS in the near 

future. Her ASA was stopped as well. Her eliquis will be continued. Echo was 

obtained as above. Her magnesium was low here and this was supplemented as 

needed. She continued to have bilateral shoulder pain and has an appointment 

today with Dr. Penn for bilateral shoulder injections. She remained on the 

cardiac monitor while here and no abnormalities were noted prior to discharge 

with the exception of some very mild bradycardia. She will be discharged today 

and return to [facility]. Dr. Dela Cruz will contact PCP for update. 








- Patient Instructions


Diet: Heart Healthy Diet


Activity: As Tolerated


Driving: Do Not Drive


Notify Provider of: Fever, Increased Pain, Nausea and/or Vomiting


Other/Special Instructions: - Please take all new medications as directed.  - 

Resume all home medications and routine home activities per PT/OT.  - Recommend 

GI/GS eval for Hemoccult Positive and Anemia.  - Recommend check BP and HR. 

Show log on follow up appointment with PCP.  - Call or follow up with your PCP 

for any questions or concerns after discharge.  - Follow up with your PCP in 1 

week.  - Come back or seek immediate care should your symptoms persist or get 

worse





- Discharge Plan


*PRESCRIPTION DRUG MONITORING PROGRAM REVIEWED*: No


*COPY OF PRESCRIPTION DRUG MONITORING REPORT IN PATIENT CLAY: No


Prescriptions/Med Rec: 


Amiodarone [Cordarone] 200 mg PO DAILY #30 tab


Ferrous Sulfate [Iron] 325 mg PO BID #60 tablet


Home Medications: 


 Home Meds





Acetaminophen [Tylenol] 325 mg PO Q6HR PRN 12/23/18 [History]


Allopurinol [Zyloprim] 100 mg PO BID 12/23/18 [History]


Bumetanide [Bumex] 1 mg PO BID 12/23/18 [History]


Fosinopril Sodium 10 mg PO DAILY 12/23/18 [History]


Loratadine 10 mg PO DAILY 12/23/18 [History]


Multivitamin [Multi-Vitamin Daily] 1 tab PO DAILY 12/23/18 [History]


Simvastatin 20 mg PO DAILY 12/23/18 [History]


Vitamin E 400 units PO DAILY 12/23/18 [History]


traMADol [Ultram] 50 mg PO Q6HR PRN 12/23/18 [History]


Benzonatate [Tessalon Perle] 100 mg PO TID PRN 12/24/18 [History]


Cetirizine HCl [All Day Allergy] 10 mg PO DAILY PRN 12/24/18 [History]


Niacin 500 mg PO DAILY 12/24/18 [History]


Ultimate Tendon Support. 2 tab PO DAILY 12/24/18 [History]


diphenhydrAMINE [Benadryl] 25 mg PO Q6HR PRN 12/24/18 [History]


Amiodarone [Cordarone] 200 mg PO DAILY #30 tab 12/27/18 [Rx]


Apixaban [Eliquis] 2.5 mg PO BID #30 12/27/18 [Rx]


Ascorbic Acid 500 mg PO BID #30 12/27/18 [Rx]


Diltiazem HCl [Diltiazem ER] 120 mg PO DAILY #0 12/27/18 [Rx]


Ferrous Sulfate [Iron] 325 mg PO BID #60 tablet 12/27/18 [Rx]








Oxygen Therapy Mode: Room Air


Patient Handouts:  Digoxin Toxicity, Anemia, Hypomagnesemia, Chronic Kidney 

Disease, Adult, Easy-to-Read, Heart Failure, Easy-to-Read, Dizziness, Easy-to-

Read, Atrial Fibrillation, Easy-to-Read, Peripheral Edema


Referrals: 


Pravin Horta MD [Primary Care Provider] -  (please schedule a follow up 

appointment with PCP within 7-10 days.)





- Discharge Summary/Plan Comment


DC Time >30 min.: No





- General Info


Date of Service: 12/27/18


Admission Dx/Problem (Free Text: 


 Admission Diagnosis/Problem





Admission Diagnosis/Problem      Lightheadedness








Subjective Update: 


In to see Kamla. She is sitting up in the chair and she is awaiting 

discharge. She has an appointment with Dr. Penn for bilateral shoulder 

injections later today. She has no complaints other than her shoulders hurt. 

She denies any dizziness or syncope. She will be discharged today to her Dr. Penn appointment and then [facility], where she resides. 





Functional Status: Reports: Pain Controlled, Tolerating Diet, Ambulating, 

Urinating.  Denies: New Symptoms





- Review of Systems


General: Reports: No Symptoms.  Denies: Fever, Weakness, Fatigue, Malaise, 

Chills


HEENT: Reports: No Symptoms.  Denies: Eye Pain, Headaches, Sore Throat, Visual 

Changes


Pulmonary: Reports: No Symptoms.  Denies: Shortness of Breath, Pleuritic Chest 

Pain, Cough, Sputum, Wheezing


Cardiovascular: Reports: No Symptoms.  Denies: Chest Pain, Palpitations, 

Dyspnea on Exertion, Lightheadedness


Gastrointestinal: Reports: No Symptoms.  Denies: Abdominal Pain, Constipation, 

Diarrhea, Nausea, Vomiting


Genitourinary: Reports: No Symptoms.  Denies: Pain


Musculoskeletal: Reports: Shoulder Pain (chronic - getting injection with Dr. Penn today. )


Skin: Reports: No Symptoms


Neurological: Reports: No Symptoms.  Denies: Confusion, Dizziness, Headache


Psychiatric: Reports: No Symptoms





- Patient Data


Vitals - Most Recent: 


 Last Vital Signs











Temp  98.2 F   12/27/18 03:00


 


Pulse  81   12/27/18 03:00


 


Resp  18   12/27/18 03:00


 


BP  137/48 L  12/27/18 03:00


 


Pulse Ox  92 L  12/27/18 03:00











Weight - Most Recent: 174 lb


I&O - Last 24 hours: 


 Intake & Output











 12/26/18 12/27/18 12/27/18





 22:59 06:59 14:59


 


Intake Total 610 400 


 


Output Total 350 300 


 


Balance 260 100 











Lab Results - Last 24 hrs: 


 Laboratory Results - last 24 hr











  12/26/18 12/27/18 12/27/18 Range/Units





  05:33 05:10 05:10 


 


WBC    5.72  (3.98-10.04)  K/mm3


 


RBC    2.84 L  (3.98-5.22)  M/mm3


 


Hgb    8.9 L  (11.2-15.7)  gm/L


 


Hct    29.0 L  (34.1-44.9)  %


 


MCV    102.1 H  (79.4-94.8)  fl


 


MCH    31.3  (25.6-32.2)  pg


 


MCHC    30.7 L  (32.2-35.5)  g/dl


 


RDW Std Deviation    49.3 H  (36.4-46.3)  fL


 


Plt Count    226  (182-369)  K/mm3


 


MPV    10.7  (9.4-12.3)  fl


 


Neut % (Auto)    66.9  (34.0-71.1)  %


 


Lymph % (Auto)    21.7  (19.3-51.7)  %


 


Mono % (Auto)    8.9  (4.7-12.5)  %


 


Eos % (Auto)    2.1  (0.7-5.8)  


 


Baso % (Auto)    0.2  (0.1-1.2)  %


 


Neut # (Auto)    3.83  (1.56-6.13)  K/mm3


 


Lymph # (Auto)    1.24  (1.18-3.74)  K/mm3


 


Mono # (Auto)    0.51 H  (0.24-0.36)  K/mm3


 


Eos # (Auto)    0.12  (0.04-0.36)  K/mm3


 


Baso # (Auto)    0.01  (0.01-0.08)  K/mm3


 


Sodium   137   (136-145)  mEq/L


 


Potassium   3.9   (3.5-5.1)  mEq/L


 


Chloride   97 L   ()  mEq/L


 


Carbon Dioxide   35 H   (21-32)  mEq/L


 


Anion Gap   8.9   (5-15)  


 


BUN   30 H   (7-18)  mg/dL


 


Creatinine   1.6 H   (0.55-1.02)  mg/dL


 


Est Cr Clr Drug Dosing   22.03   mL/min


 


Estimated GFR (MDRD)   31   (>60)  mL/min


 


BUN/Creatinine Ratio   18.8 H   (14-18)  


 


Glucose   118 H   ()  mg/dL


 


Calcium   9.7   (8.5-10.1)  mg/dL


 


Magnesium   1.7 L   (1.8-2.4)  mg/dl


 


Iron  30 L    ()  ug/dL


 


TIBC  378    (100-400)  ug/dL


 


% Saturation  8 L    (20-55)  %


 


Transferrin  302    (202-364)  mg/dL


 


Digoxin   1.8   (0.9-2.0)  ng/mL











[facility] Results - Last 24 hrs: 


 Microbiology











 12/26/18 10:00 Stool Occult Blood ([facility]) - Final





 Stool / Feces    POSITIVE OCCULT BLOOD











Med Orders - Current: 


 Current Medications





Acetaminophen (Tylenol)  325 mg PO Q6HR PRN


   PRN Reason: Fever Greater Than 101


Acetaminophen (Tylenol)  650 mg PO Q4H PRN


   PRN Reason: Pain (Mild 1-3)/fever


Hydrocodone Bitart/Acetaminophen (Norco 325-5 Mg)  1 tab PO Q4H PRN


   PRN Reason: Pain (moderate 4-6)


   Last Admin: 12/24/18 06:17 Dose:  1 tab


Albuterol/Ipratropium (Duoneb 3.0-0.5 Mg/3 Ml)  3 ml NEB Q4H PRN


   PRN Reason: Shortness Of Breath/wheezing


Apixaban (Eliquis)  2.5 mg PO BID [facility]


   Last Admin: 12/26/18 20:21 Dose:  2.5 mg


Ascorbic Acid (Vitamin C)  500 mg PO BID [facility]


   Last Admin: 12/26/18 20:23 Dose:  500 mg


Benzonatate (Tessalon Perles)  100 mg PO TID PRN


   PRN Reason: Cough


Bisacodyl (Dulcolax)  5 mg PO DAILY PRN


   PRN Reason: Constipation


   Last Admin: 12/25/18 19:59 Dose:  5 mg


Bisacodyl (Dulcolax)  10 mg RECTAL BID PRN


   PRN Reason: Constipation


   Last Admin: 12/26/18 08:20 Dose:  10 mg


Bumetanide (Bumex)  0.5 mg PO BIDDIURETIC [facility]


   Last Admin: 12/27/18 06:20 Dose:  0.5 mg


Diltiazem HCl (Cardizem)  5 mg IVPUSH Q4H PRN


   PRN Reason: tachycardiac


Diltiazem HCl (Cardizem Cd)  120 mg PO DAILY [facility]


   Last Admin: 12/26/18 08:14 Dose:  120 mg


Diphenhydramine HCl (Benadryl)  25 mg PO Q6H PRN


   PRN Reason: Allergies


Docusate Sodium (Colace)  100 mg PO BID [facility]


   Last Admin: 12/26/18 20:24 Dose:  Not Given


Famotidine (Pepcid)  20 mg PO BID [facility]


   Last Admin: 12/26/18 20:22 Dose:  20 mg


Hydralazine HCl (Apresoline)  10 mg IVPUSH Q4H PRN


   PRN Reason: Hypertension


Hydrochlorothiazide (Hydrochlorothiazide)  12.5 mg PO BIDDIURETIC [facility]


Hydromorphone HCl (Dilaudid)  0.25 mg IVPUSH Q2H PRN


   PRN Reason: Pain (severe 7-10)


Promethazine HCl 6.25 mg/ (Sodium Chloride)  50.25 mls @ 100 mls/hr IV Q6H PRN


   PRN Reason: Nausea/Vomiting


Loratadine (Claritin)  10 mg PO DAILY [facility]


   Last Admin: 12/26/18 08:16 Dose:  10 mg


Lorazepam (Ativan)  0.25 mg IV Q6H PRN


   PRN Reason: Anxiety


Magnesium Oxide (Magnesium Oxide)  800 mg PO ONETIME ONE


   Stop: 12/27/18 09:16


Magnesium Sulfate (Pharmacy To Dose - Magnesium Replacement)  1 dose .XX 

ASDIRECTED [facility]


Multivitamins (Thera)  1 each PO DAILY [facility]


   Last Admin: 12/26/18 08:18 Dose:  1 each


Niacin (Niaspan)  500 mg PO DAILY [facility]


   Last Admin: 12/26/18 08:18 Dose:  500 mg


Fosinopril Sodium [ Fosinopril Sodium] 20mg **Pt Own**  0.5 tab PO DAILY [facility]


Ondansetron HCl (Zofran)  4 mg IV Q6H PRN


   PRN Reason: Nausea/Vomiting


   Last Admin: 12/26/18 18:22 Dose:  4 mg


Amiodarone 200 Mg (Tab **Ptom)  0 each PO DAILY [facility]


Polyethylene Glycol (Miralax)  17 gm PO DAILY PRN


   PRN Reason: Constipation


Polysaccharide Iron Complex (Ferrex 150)  300 mg PO BID [facility]


   Last Admin: 12/26/18 20:24 Dose:  300 mg


Potassium Chloride (Pharmacy To Dose - Potassium Replacement)  1 dose .XX 

ASDIRECTED [facility]


Senna/Docusate Sodium (Senna Plus)  1 tab PO BID PRN


   PRN Reason: Constipation


   Last Admin: 12/26/18 08:18 Dose:  1 tab


Sodium Chloride (Saline Flush)  10 ml FLUSH ASDIRECTED PRN


   PRN Reason: Keep Vein Open


   Last Admin: 12/23/18 17:00 Dose:  10 ml


Tramadol HCl (Ultram)  50 mg PO Q6H PRN


   PRN Reason: PAIN


   Last Admin: 12/26/18 12:01 Dose:  50 mg





Discontinued Medications





Amiodarone HCl (Cordarone)  400 mg PO DAILY [facility]


   Stop: 01/08/19 09:01


Amiodarone HCl (Cordarone)  200 mg PO DAILY [facility]


Ascorbic Acid (Vitamin C)  500 mg PO DAILY [facility]


   Last Admin: 12/26/18 08:18 Dose:  500 mg


Aspirin (Halfprin)  81 mg PO DAILY [facility]


   Last Admin: 12/26/18 08:17 Dose:  81 mg


Bumetanide (Bumex)  0.5 mg IVPUSH ONETIME ONE


   Stop: 12/23/18 21:42


   Last Admin: 12/23/18 22:31 Dose:  0.5 mg


Bumetanide (Bumex)  1 mg PO BIDDIURETIC [facility]


   Last Admin: 12/26/18 13:09 Dose:  1 mg


Docusate Sodium (Colace)  100 mg PO BID PRN


   PRN Reason: Constipation


Furosemide (Lasix)  40 mg PO BIDDIURETIC [facility]


   Last Admin: 12/25/18 06:31 Dose:  40 mg


Hydrochlorothiazide (Hydrochlorothiazide)  12.5 mg PO BIDDIURETIC [facility]


   Last Admin: 12/24/18 06:12 Dose:  12.5 mg


Magnesium Oxide (Magnesium Oxide)  400 mg PO ONETIME ONE


   Stop: 12/23/18 22:01


   Last Admin: 12/23/18 22:30 Dose:  400 mg


Amiodarone Hcl [ Amiodarone Hcl] 200 Mg**Pt Own**  400 mg PO DAILY [facility]


   Last Admin: 12/25/18 08:21 Dose:  Not Given


Non-Formulary Medication (Bumetanide [Bumex])  1 tab PO BID [facility]


Non-Formulary Medication (Fosinopril Sodium [Fosinopril Sodium])  0.5 tab PO 

DAILY [facility]


Non-Formulary Medication (Niacin [Niacin])  1 tab PO DAILY [facility]


Non-Formulary Medication (Vitamin E [Vitamin E])  1 tab PO DAILY [facility]


Non-Formulary Medication (Cetirizine Hcl [All Day Allergy])  10 mg PO DAILY PRN


   PRN Reason: Allergies


Ondansetron HCl (Zofran)  4 mg IVPUSH ONETIME ONE


   Stop: 12/23/18 16:36


   Last Admin: 12/23/18 17:00 Dose:  4 mg


Pantoprazole Sodium (Protonix Iv***)  40 mg IVPUSH ONETIME ONE


   Stop: 12/26/18 15:13


   Last Admin: 12/26/18 16:08 Dose:  40 mg


Amiodarone 200 Mg (Tab **Ptom)  0 each PO ONETIME ONE


   Stop: 12/26/18 13:01


   Last Admin: 12/26/18 13:10 Dose:  1 each


Tramadol HCl (Ultram)  50 mg PO ONETIME ONE


   Stop: 12/23/18 19:05


   Last Admin: 12/23/18 19:26 Dose:  50 mg











- Exam


Quality Assessment: Reports: DVT Prophylaxis


General: Reports: Alert, Oriented, Cooperative, No Acute Distress


HEENT: Reports: Pupils Equal, Pupils Reactive, EOMI, Mucous Membr. Moist/Pink


Neck: Reports: Supple, Trachea Midline, No JVD


Lungs: Reports: Clear to Auscultation, Normal Respiratory Effort


Cardiovascular: Reports: Regular Rate, Regular Rhythm


GI/Abdominal Exam: Normal Bowel Sounds, Soft, Non-Tender, No Distention, No 

Abnormal Bruit


 (Female) Exam: Deferred


Rectal (Female) Exam: Deferred


Back Exam: Reports: Normal Inspection, Full Range of Motion


Extremities: Normal Inspection, Normal Range of Motion, Non-Tender, No Pedal 

Edema, Normal Capillary Refill


Skin: Reports: Warm, Dry, Intact


Neurological: Reports: No New Focal Deficit


Psy/Mental Status: Reports: Alert, Normal Affect, Normal Mood

## 2019-04-15 ENCOUNTER — HOSPITAL ENCOUNTER (EMERGENCY)
Dept: HOSPITAL 41 - JD.ED | Age: 84
Discharge: HOME | End: 2019-04-15
Payer: MEDICARE

## 2019-04-15 DIAGNOSIS — Z79.899: ICD-10-CM

## 2019-04-15 DIAGNOSIS — E78.00: ICD-10-CM

## 2019-04-15 DIAGNOSIS — N18.9: ICD-10-CM

## 2019-04-15 DIAGNOSIS — Z88.5: ICD-10-CM

## 2019-04-15 DIAGNOSIS — Z91.09: ICD-10-CM

## 2019-04-15 DIAGNOSIS — M25.441: Primary | ICD-10-CM

## 2019-04-15 DIAGNOSIS — Z88.8: ICD-10-CM

## 2019-04-15 DIAGNOSIS — I48.91: ICD-10-CM

## 2019-04-15 DIAGNOSIS — I50.9: ICD-10-CM

## 2019-04-15 DIAGNOSIS — I13.0: ICD-10-CM

## 2019-04-15 NOTE — EDM.PDOC
<Ji Burris - Last Filed: 04/15/19 19:42>





ED HPI GENERAL MEDICAL PROBLEM





- General


Chief Complaint: Upper Extremity Injury/Pain


Stated Complaint: RT ARM SWELLING


Time Seen by Provider: 04/15/19 18:35





- Related Data


 Allergies











Allergy/AdvReac Type Severity Reaction Status Date / Time


 


codeine Allergy  Rash Verified 04/15/19 19:11


 


iodine Allergy  Rash Verified 04/15/19 19:11


 


metoprolol [From Toprol XL] Allergy  swelling Verified 04/15/19 19:11





   of lips  


 


naproxen [From Naprelan] Allergy  Rash Verified 04/15/19 19:11


 


prednisone Allergy  Other Verified 04/15/19 19:11


 


propoxyphene Allergy  urine Verified 04/15/19 19:11





   retention  











Home Meds: 


 Home Meds





Acetaminophen [Tylenol] 325 mg PO Q6HR PRN 12/23/18 [History]


Allopurinol [Zyloprim] 100 mg PO BID 12/23/18 [History]


Bumetanide [Bumex] 1 mg PO DAILY 12/23/18 [History]


Fosinopril Sodium 10 mg PO DAILY 12/23/18 [History]


Loratadine 10 mg PO DAILY PRN 12/23/18 [History]


Multivitamin [Multi-Vitamin Daily] 1 tab PO DAILY 12/23/18 [History]


Simvastatin 20 mg PO DAILY 12/23/18 [History]


traMADol [Ultram] 50 mg PO BID PRN 12/23/18 [History]


Benzonatate [Tessalon Perle] 100 mg PO TID PRN 12/24/18 [History]


Cetirizine HCl [All Day Allergy] 10 mg PO DAILY PRN 12/24/18 [History]


Niacin 500 mg PO DAILY 12/24/18 [History]


Amiodarone [Cordarone] 200 mg PO DAILY #30 tab 12/27/18 [Rx]


Apixaban [Eliquis] 2.5 mg PO BID #30 12/27/18 [Rx]


Ascorbic Acid 500 mg PO BID #30 12/27/18 [Rx]


Diltiazem HCl [Diltiazem ER] 120 mg PO DAILY #0 12/27/18 [Rx]


Ferrous Sulfate [Iron] 325 mg PO BID #60 tablet 12/27/18 [Rx]


Acetaminophen [Tylenol] 650 mg PO BID 04/15/19 [History]


Famotidine [Pepcid] 20 mg PO DAILY 04/15/19 [History]


Sennosides/Docusate Sodium [Senna Plus Tablet] 1 tab PO BID PRN 04/15/19 [

History]


Vitamin E 400 unit PO DAILY 04/15/19 [History]











Course





- Vital Signs


Last Recorded V/S: 


 Last Vital Signs











Temp  98.2 F   04/15/19 18:18


 


Pulse  75   04/15/19 18:18


 


Resp  20   04/15/19 18:18


 


BP  146/61 H  04/15/19 18:18


 


Pulse Ox  100   04/15/19 18:18














Departure





- Departure


Disposition: Home, Self-Care 01


Clinical Impression: 


 Swelling of right hand








- Discharge Information


Instructions:  Edema, Easy-to-Read


Referrals: 


Pravin Horta MD [Primary Care Provider] - 


Forms:  ED Department Discharge


Additional Instructions: 


No blood clot noted on ultrasound of the right upper extremity. Elevate when 

able to reduce and swelling. Please refer back to PCP in 3-5 days if symptoms 

persist. Return to the ED if you develop any new or worsening symptoms.





<Lanre King O - Last Filed: 04/15/19 21:20>





ED HPI GENERAL MEDICAL PROBLEM





- General


Source of Information: Reports: Patient


History Limitations: Reports: No Limitations





- History of Present Illness


INITIAL COMMENTS - FREE TEXT/NARRATIVE: 


Patient is a 84-year-old female with history of A. fib with RVR on Eliquis, 

heart failure, renal insufficiency and also peripheral edema. She states she 

noticed some swelling to her right hand, forearm yesterday. She did apply a 

pharyngeal to the right hand where a small area of redness was present with 

resolution of symptoms. The only thing she came in contact with his dialysis 

soap twitch she's been using for the past few days. Otherwise has never had any 

symptoms as such. She recently had a lumpectomy of a cancerous tumor to the 

right breast this past Tuesday with no complications. They have not started the 

patient on any type of radiation. She states this morning her hand was only 

faintly swollen and talk with clenching her fist. There is no pain associated 

with it. Or rash present. She has chronic right shoulder pain unchanged with 

recent complaint. There is no redness, rash, bruising, or recent trauma/

activity that may have precipitated this. She has no history of lymphedema. She 

has been in contact with her PCP and surgeon with concerns of patient may have 

a blood clot. Patient denies any chest pain, short of breath, PND, orthopnea, 

fever, chills, abdominal pain, or increased swelling to her lower extremities. 

She has chronic lower extremity edema unchanged as a recent. Right usually 

greater than left. No pain to her legs posteriorly.





Treatments PTA: Reports: Other (see below)


Other Treatments PTA: tramadol and tyelnol today


  ** Right Hand


Pain Score (Numeric/FACES): 2





Past Medical History


HEENT History: Reports: Cataract, Hard of Hearing, Impaired Vision


Cardiovascular History: Reports: Afib, Arrhythmia, Heart Failure, High 

Cholesterol, Hypertension, Syncope


Respiratory History: Reports: None


Gastrointestinal History: Reports: None


Genitourinary History: Reports: Chronic Renal Insuffiency


OB/GYN History: Reports: Pregnancy


Musculoskeletal History: Reports: Arthritis, Back Pain, Chronic, Gout, Other (

See Below)


Other Musculoskeletal History: Chronic bilateral shoulder pain


Neurological History: Reports: Vertigo


Endocrine/Metabolic History: Reports: Obesity/BMI 30+


Other Endocrine/Metabolic History: gout


Hematologic History: Reports: None


Immunologic History: Reports: None


Oncologic (Cancer) History: Reports: Other (See Below)


Other Oncologic History: potential breast cancer, recent diagnossis.  breast 

cancer and is to start radiation


Dermatologic History: Reports: Other (See Below)


Other Dermatologic History: cellulitis to leg right





- Infectious Disease History


Infectious Disease History: Reports: Chicken Pox, Measles, Shingles





- Past Surgical History


HEENT Surgical History: Reports: Cataract Surgery


Cardiovascular Surgical History: Reports: None


Female  Surgical History: Reports: None, Breast Biopsy


Other Female  Surgeries/Procedures: right breast tissue removed for cancer 

purposes


Endocrine Surgical History: Reports: None


Neurological Surgical History: Reports: None


Musculoskeletal Surgical History: Reports: Other (See Below)


Other Musculoskeletal Surgeries/Procedures:: knee repair


Other Oncologic Surgeries/Procedures: titanium tag in breast for marking





Social & Family History





- Family History


Family Medical History: Noncontributory





- Tobacco Use


Smoking Status *Q: Never Smoker





- Caffeine Use


Caffeine Use: Reports: Soda, Tea





- Recreational Drug Use


Recreational Drug Use: No





Review of Systems





- Review of Systems


Review Of Systems: See Below


Constitutional: Reports: No Symptoms


Respiratory: Reports: No Symptoms


Cardiovascular: Reports: No Symptoms


GI/Abdominal: Reports: No Symptoms


Musculoskeletal: Reports: Shoulder Pain (Chronic unchanged), Arm Pain (Medial 

aspect of the right arm disappeared to the elbow. No pain distally. Increased 

swelling to the right hand, forearm, arm yesterday that subsided with only 

small amount present today.)


Skin: Reports: No Symptoms


Neurological: Reports: No Symptoms





ED EXAM, GENERAL





- Physical Exam


Exam: See Below


Exam Limited By: No Limitations


General Appearance: Alert, WD/WN, No Apparent Distress


Ears: Hearing Grossly Normal


Nose: Normal Inspection


Throat/Mouth: Normal Voice, No Airway Compromise


Neck: Normal Inspection, Supple


Respiratory/Chest: No Respiratory Distress, Lungs Clear, Normal Breath Sounds, 

No Accessory Muscle Use, Chest Non-Tender


Cardiovascular: Normal Peripheral Pulses, Regular Rate, Rhythm, Systolic Murmur 

(3 out of 6 Left sternal border mid clavicular line fifth intercostal space)


Peripheral Pulses: 2+: Radial (L), Radial (R)


GI/Abdominal: Normal Bowel Sounds, Soft, Non-Tender, No Organomegaly, No 

Distention


Back Exam: Normal Inspection


Extremities: Pedal Edema (1+ edema bilaterally chronic unchanged with no 

tenderness posteriorly.)


Neurological: Alert, Oriented, CN II-XII Intact, Normal Cognition, No Motor/

Sensory Deficits


Psychiatric: Normal Affect, Normal Mood


Skin Exam: Warm, Dry, Intact, Normal Color





Course





- Re-Assessments/Exams


Free Text/Narrative Re-Assessment/Exam: 





On exam some faint swelling noted to the right hand. With clenching of her hand 

patient states it feels tight. There is no erythema noted to the hand. Some 

minimal discomfort along the medial aspect of the right arm just superior to 

the elbow with no palpable cord. No bruising, no swelling, no sensation changes

, no decreased range of motion of the elbow. Limited range of motion of the 

right shoulder which is chronic. Unchanged.  Patient has been in contact with 

her PCP and general surgeon with concerns of patient may have a blood clot. 

Patient was recently diagnosed with breast cancer and had a lumpectomy one week 

ago to the right breast. She has not started radiation treatment. She is on 

Eliquis already 2.5 mg twice a day.





Ultrasound right upper extremity indicated no evidence of venous thrombosis 

within the right upper extremity.





Discussed  results of the ultrasound with the patient.  Unclear etiology of 

current swelling to the right hand. She does have edema to lower extremities 

which are constant and unchanged. She is on Bumex. She may require increased 

dose of Bumex thus patient will be referred back to her PCP in 3 days for 

reevaluation. Return precautions were discussed with the patient. Patient had 

no further questions or concerns and agreed with plan.








Departure





- Departure


Time of Disposition: 21:09


Condition: Good

## 2019-04-15 NOTE — US
Right upper extremity venous ultrasound: Duplex and color flow imaging

 was obtained of the right internal jugular, subclavian, axillary, 

basilic, brachial, radial and ulnar veins.

 

Normal phasic flow, augmentation and compression is seen.

 

Impression:

1.  No evidence of venous thrombosis within the right upper extremity.

 

Diagnostic code #1

## 2019-06-06 ENCOUNTER — HOSPITAL ENCOUNTER (EMERGENCY)
Dept: HOSPITAL 41 - JD.ED | Age: 84
Discharge: HOME | End: 2019-06-06
Payer: MEDICARE

## 2019-06-06 DIAGNOSIS — Z88.5: ICD-10-CM

## 2019-06-06 DIAGNOSIS — Z79.899: ICD-10-CM

## 2019-06-06 DIAGNOSIS — I50.9: ICD-10-CM

## 2019-06-06 DIAGNOSIS — Z88.8: ICD-10-CM

## 2019-06-06 DIAGNOSIS — I48.0: ICD-10-CM

## 2019-06-06 DIAGNOSIS — I12.9: Primary | ICD-10-CM

## 2019-06-06 DIAGNOSIS — N18.3: ICD-10-CM

## 2019-06-06 NOTE — EDM.PDOC
ED HPI GENERAL MEDICAL PROBLEM





- General


Chief Complaint: Cardiovascular Problem


Stated Complaint: LOW BLOOD PRESSURE


Time Seen by Provider: 06/06/19 20:06





- History of Present Illness


INITIAL COMMENTS - FREE TEXT/NARRATIVE: 





Patient presents after having an episode of dizziness at home she also 

complains of increased swelling in her extremities.


Patient had an episode of atrial fibrillation lasted a couple hours during this 

she had some intermittent dizziness this resolved on its own. The patient 

currently takes amiodarone. She's on Eliquis and diltiazem. She's doing well at 

this time every couple weeks she has an intermittent episode not as severe as 

tonight's episode. She's had some increasing edema in her extremities. In the 

distant past she had some surgery on her right elbow now she started to get 

some edema around her elbow. And she's getting some mild edema in her lower 

extremities. This does not get in the way of her activity does not cause her 

any distress.


She is getting Bumex usually 1-1/2 mg a day as needed for the edema.





- Related Data


 Allergies











Allergy/AdvReac Type Severity Reaction Status Date / Time


 


codeine Allergy  Rash Verified 04/15/19 19:11


 


iodine Allergy  Rash Verified 04/15/19 19:11


 


metoprolol [From Toprol XL] Allergy  swelling Verified 04/15/19 19:11





   of lips  


 


naproxen [From Naprelan] Allergy  Rash Verified 04/15/19 19:11


 


prednisone Allergy  Other Verified 04/15/19 19:11


 


propoxyphene Allergy  urine Verified 04/15/19 19:11





   retention  











Home Meds: 


 Home Meds





Acetaminophen [Tylenol] 325 mg PO Q6HR PRN 12/23/18 [History]


Allopurinol [Zyloprim] 100 mg PO BID 12/23/18 [History]


Bumetanide [Bumex] 1 mg PO DAILY 12/23/18 [History]


Fosinopril Sodium 10 mg PO DAILY 12/23/18 [History]


Loratadine 10 mg PO DAILY PRN 12/23/18 [History]


Multivitamin [Multi-Vitamin Daily] 1 tab PO DAILY 12/23/18 [History]


Simvastatin 20 mg PO DAILY 12/23/18 [History]


traMADol [Ultram] 50 mg PO BID PRN 12/23/18 [History]


Benzonatate [Tessalon Perle] 100 mg PO TID PRN 12/24/18 [History]


Cetirizine HCl [All Day Allergy] 10 mg PO DAILY PRN 12/24/18 [History]


Niacin 500 mg PO DAILY 12/24/18 [History]


Amiodarone [Cordarone] 200 mg PO DAILY #30 tab 12/27/18 [Rx]


Apixaban [Eliquis] 2.5 mg PO BID #30 12/27/18 [Rx]


Ascorbic Acid 500 mg PO BID #30 12/27/18 [Rx]


Ferrous Sulfate [Iron] 325 mg PO BID #60 tablet 12/27/18 [Rx]


dilTIAZem HCl [Diltiazem 24Hr ER (Xr)] 120 mg PO DAILY #0 12/27/18 [Rx]


Acetaminophen [Tylenol] 650 mg PO BID 04/15/19 [History]


Famotidine [Pepcid] 20 mg PO DAILY 04/15/19 [History]


Sennosides/Docusate Sodium [Senna Plus Tablet] 1 tab PO BID PRN 04/15/19 [

History]


Vitamin E 400 unit PO DAILY 04/15/19 [History]











Past Medical History


HEENT History: Reports: Cataract, Hard of Hearing, Impaired Vision


Cardiovascular History: Reports: Afib, Arrhythmia, Heart Failure, High 

Cholesterol, Hypertension, Syncope


Respiratory History: Reports: None


Gastrointestinal History: Reports: None


Genitourinary History: Reports: Chronic Renal Insuffiency


OB/GYN History: Reports: Pregnancy


Musculoskeletal History: Reports: Arthritis, Back Pain, Chronic, Gout, Other (

See Below)


Other Musculoskeletal History: Chronic bilateral shoulder pain


Neurological History: Reports: Vertigo


Endocrine/Metabolic History: Reports: Obesity/BMI 30+


Other Endocrine/Metabolic History: gout


Hematologic History: Reports: None


Immunologic History: Reports: None


Oncologic (Cancer) History: Reports: Other (See Below)


Other Oncologic History: potential breast cancer, recent diagnossis.  breast 

cancer and is to start radiation


Dermatologic History: Reports: Other (See Below)


Other Dermatologic History: cellulitis to leg right





- Infectious Disease History


Infectious Disease History: Reports: Chicken Pox, Measles, Shingles





- Past Surgical History


HEENT Surgical History: Reports: Cataract Surgery


Cardiovascular Surgical History: Reports: None


Female  Surgical History: Reports: None, Breast Biopsy


Other Female  Surgeries/Procedures: right breast tissue removed for cancer 

purposes


Endocrine Surgical History: Reports: None


Neurological Surgical History: Reports: None


Musculoskeletal Surgical History: Reports: Other (See Below)


Other Musculoskeletal Surgeries/Procedures:: knee repair


Other Oncologic Surgeries/Procedures: titanium tag in breast for marking





Social & Family History





- Family History


Family Medical History: Noncontributory





- Caffeine Use


Caffeine Use: Reports: Soda, Tea





- Recreational Drug Use


Recreational Drug Use: No





ED ROS GENERAL





- Review of Systems


Review Of Systems: See Below


Constitutional: Reports: No Symptoms, Fever, Chills


HEENT: Reports: No Symptoms


Respiratory: Reports: No Symptoms


Cardiovascular: Reports: Lightheadedness (Intermittent once or twice a month), 

Palpitations


GI/Abdominal: Reports: No Symptoms


: Reports: No Symptoms


Musculoskeletal: Reports: No Symptoms


Skin: Reports: No Symptoms


Neurological: Reports: No Symptoms


Psychiatric: Reports: No Symptoms





ED EXAM, GENERAL





- Physical Exam


Exam: See Below


Exam Limited By: No Limitations


General Appearance: Alert, No Apparent Distress, Other (No acute distress vital 

signs stable telemetry shows sinus rhythm without ectopy)


Eye Exam: Bilateral Eye: Normal Inspection


Ears: Normal External Exam, Normal Canal, Hearing Grossly Normal, Normal TMs


Nose: Normal Inspection


Throat/Mouth: Normal Inspection, Normal Lips, No Airway Compromise


Head: Atraumatic, Normocephalic


Neck: Normal Inspection, Supple, Non-Tender, Full Range of Motion.  No: 

Lymphadenopathy (L), Lymphadenopathy (R)


Respiratory/Chest: No Respiratory Distress, Lungs Clear, Normal Breath Sounds


Cardiovascular: Normal Peripheral Pulses, Regular Rate, Rhythm, Other (He has 

no intermittent murmur heard best in the right upper sternal border sounds very 

much like an ileus murmur but it is not reproducible time)


GI/Abdominal: Normal Bowel Sounds, Soft, Non-Tender


Extremities: Other (1+ pitting edema of lower extremities around her right 

elbow she has some edema as well looks like fluid under the skin)





Course





- Vital Signs


Last Recorded V/S: 


 Last Vital Signs











Temp  36.9 C   06/06/19 19:46


 


Pulse  79   06/06/19 19:46


 


Resp  18   06/06/19 19:46


 


BP  150/70 H  06/06/19 19:46


 


Pulse Ox  99   06/06/19 19:46














- Orders/Labs/Meds


Orders: 


 Active Orders 24 hr











 Category Date Time Status


 


 EKG Documentation Completion [RC] STAT Care  06/06/19 20:36 Active











Labs: 


 Laboratory Tests











  06/06/19 06/06/19 06/06/19 Range/Units





  20:55 20:55 20:55 


 


WBC  6.06    (3.98-10.04)  K/mm3


 


RBC  3.13 L    (3.98-5.22)  M/mm3


 


Hgb  10.0 L    (11.2-15.7)  gm/L


 


Hct  31.5 L    (34.1-44.9)  %


 


MCV  100.6 H    (79.4-94.8)  fl


 


MCH  31.9    (25.6-32.2)  pg


 


MCHC  31.7 L    (32.2-35.5)  g/dl


 


RDW Std Deviation  52.3 H    (36.4-46.3)  fL


 


Plt Count  223    (182-369)  K/mm3


 


MPV  9.8    (9.4-12.3)  fl


 


Neutrophils % (Manual)  68 H    (40-60)  %


 


Band Neutrophils %  0    (0-10)  %


 


Lymphocytes % (Manual)  26    (20-40)  %


 


Atypical Lymphs %  0    %


 


Monocytes % (Manual)  6    (2-10)  %


 


Eosinophils % (Manual)  0 L    (0.7-5.8)  %


 


Basophils % (Manual)  0 L    (0.1-1.2)  


 


Platelet Estimate  Adequate    


 


Anisocytosis  1+ slight    


 


Macrocytosis  1+ slight    


 


Ovalocytes  1+ slight    


 


RBC Morph Comment  Not Reportable    


 


Sodium   137   (136-145)  mEq/L


 


Potassium   4.8   (3.5-5.1)  mEq/L


 


Chloride   103   ()  mEq/L


 


Carbon Dioxide   25  D   (21-32)  mEq/L


 


Anion Gap   13.8   (5-15)  


 


BUN   45 H   (7-18)  mg/dL


 


Creatinine   1.9 H   (0.55-1.02)  mg/dL


 


Est Cr Clr Drug Dosing   18.23   mL/min


 


Estimated GFR (MDRD)   25   (>60)  mL/min


 


BUN/Creatinine Ratio   23.7 H   (14-18)  


 


Glucose   134 H   ()  mg/dL


 


Calcium   9.8   (8.5-10.1)  mg/dL


 


Total Bilirubin   0.4   (0.2-1.0)  mg/dL


 


AST   19   (15-37)  U/L


 


ALT   29   (14-59)  U/L


 


Alkaline Phosphatase   62   ()  U/L


 


Troponin I   < 0.017   (0.00-0.056)  ng/mL


 


NT-Pro-B Natriuret Pep    811 H  (0-450)  pg/mL


 


Total Protein   6.0 L   (6.4-8.2)  g/dl


 


Albumin   3.4   (3.4-5.0)  g/dl


 


Globulin   2.6   gm/dL


 


Albumin/Globulin Ratio   1.3   (1-2)  














- Re-Assessments/Exams


Free Text/Narrative Re-Assessment/Exam: 





06/06/19 22:12


Labs reviewed her BNP is 811 however when she was admitted in January it was 

4407 dropped to 695 and a couple of days. She's been started on Bumex and her 

creatinine is up to 1.9 this is the highest it's been since the time of her 

admission. I am not overly eager to get more aggressive on her diuretics have 

reviewed her medications and I think the safest option at this point is her to 

maximize her therapy with her compression wraps and stocking see if we can get 

a better effect with that on her lower legs and her right arm rather than put 

in more burning on her kidneys. The patient the family are in agreement to this 

another possible benefit would be to try and transition her from the diltiazem 

to a long-acting beta blocker such as Toprol-XL as the diltiazem may be 

contributing to the edema and fatigue. It sounds like tonight she had an 

episode of atrial fibrillation had some associated dizziness with this this 

could've been contributing to by the Bumex or could've been from the atrial 

fib. Will not intervene with this at this point she's having intermittent for 

the most part rare symptoms from this. However is recommended that the patient 

follow up with her regular physician this next week if possible.








Departure





- Departure


Time of Disposition: 22:15


Disposition: Home, Self-Care 01


Condition: Good


Clinical Impression: 


 Paroxysmal atrial fibrillation, Heart failure, Chronic renal insufficiency, 

stage III (moderate)





Referrals: 


Pravin Horta MD [Primary Care Provider] - 


Additional Instructions: 


Return to emergency room if any questions problems worsening symptoms. 





Try and follow-up with your regular doctor next week if possible. 





At this time no change in medication changing her medications could potentially 

cause more harm than benefit. 





As we discussed with try and increase in optimize the use of the compressive 

wraps to minimize the edema.





- My Orders


Last 24 Hours: 


My Active Orders





06/06/19 20:36


EKG Documentation Completion [RC] STAT 














- Assessment/Plan


Last 24 Hours: 


My Active Orders





06/06/19 20:36


EKG Documentation Completion [RC] STAT

## 2019-11-02 NOTE — CT
Head CT

 

Technique: Multiple axial sections through the brain were obtained.  

Intravenous contrast was not utilized.

 

Comparison: Prior head CT study of 12/09/18 is available.

 

Findings: Ventricles along with basal cisterns and sulci over 

convexities are moderately prominent.  Mild diminished density is 

noted within the periventricular white matter compatible with small 

vessel ischemic demyelination change.  No evidence of intracranial 

hemorrhage.  No midline shift or mass effect is seen.

 

Soft tissue swelling is seen within the left frontal scalp.

 

Atherosclerotic calcification is seen within the carotid siphon.  No 

acute paranasal sinus disease is seen.  No acute calvarial abnormality

 is appreciated.

 

Impression:

1.  Senescent change as noted above.

2.  Soft tissue swelling is seen within left frontal scalp.

3.  No acute intracranial abnormality is identified.

 

Diagnostic code #2

## 2019-11-02 NOTE — EDM.PDOC
ED HPI GENERAL MEDICAL PROBLEM





- General


Chief Complaint: Trauma


Stated Complaint: [facility]


Time Seen by Provider: 11/02/19 12:42


Source of Information: Reports: Patient, EMS


History Limitations: Reports: No Limitations





- History of Present Illness


INITIAL COMMENTS - FREE TEXT/NARRATIVE: 





The patient presents by [facility] for a fall.  She said she was getting 

out of her chair and may have tripped on some carpet.  She laid on her floor 

for abut 40 minutes.  She has a headache, neck pain, left forearm pain and 

bilateral knee pain.  She also has some left chest pain.  She has a contusion, 

edema and skin tears to the left side of the face.  Multiple skin tears to the 

left forearm.  She is on eliquis for atrial fibrillation.  She had no LOC.


Onset: Sudden


Duration: Hour(s):


Location: Reports: Head, Face, Upper Extremity, Left (forearm), Lower Extremity

, Left (knee), Lower Extremity, Right (knee)


Quality: Reports: Sharp


Severity: Moderate


Improves with: Reports: Immobilization


Worsens with: Reports: Movement


Context: Reports: Trauma (Fall)


Associated Symptoms: Reports: Headaches.  Denies: Chest Pain, Fever/Chills, 

Nausea/Vomiting, Shortness of Breath


  ** Left Arm


Pain Score (Numeric/FACES): 9





  ** Left Chest


Pain Score (Numeric/FACES): 9





  ** Forehead


Pain Score (Numeric/FACES): 8





- Related Data


 Allergies











Allergy/AdvReac Type Severity Reaction Status Date / Time


 


codeine Allergy  Rash Verified 11/02/19 13:01


 


iodine Allergy  Rash Verified 11/02/19 13:01


 


metoprolol [From Toprol XL] Allergy  swelling Verified 11/02/19 13:01





   of lips  


 


naproxen [From Naprelan] Allergy  Rash Verified 11/02/19 13:01


 


prednisone Allergy  Other Verified 11/02/19 13:01


 


propoxyphene Allergy  urine Verified 11/02/19 13:01





   retention  











Home Meds: 


 Home Meds





Acetaminophen [Tylenol] 325 mg PO Q6HR PRN 12/23/18 [History]


Allopurinol [Zyloprim] 100 mg PO BID 12/23/18 [History]


Bumetanide [Bumex] 1 mg PO BID 12/23/18 [History]


Loratadine 10 mg PO DAILY PRN 12/23/18 [History]


Multivitamin [Multi-Vitamin Daily] 1 tab PO DAILY 12/23/18 [History]


Simvastatin 20 mg PO DAILY 12/23/18 [History]


traMADol [Ultram] 50 mg PO BID PRN 12/23/18 [History]


Benzonatate [Tessalon Perle] 100 mg PO TID PRN 12/24/18 [History]


Cetirizine HCl [All Day Allergy] 10 mg PO DAILY PRN 12/24/18 [History]


Niacin 500 mg PO DAILY 12/24/18 [History]


Amiodarone [Cordarone] 200 mg PO DAILY #30 tab 12/27/18 [Rx]


Apixaban [Eliquis] 2.5 mg PO BID #30 12/27/18 [Rx]


Ascorbic Acid 500 mg PO BID #30 12/27/18 [Rx]


Ferrous Sulfate [Iron] 325 mg PO BID #60 tablet 12/27/18 [Rx]


Acetaminophen [Tylenol] 650 mg PO BID 04/15/19 [History]


Famotidine [Pepcid] 20 mg PO DAILY 04/15/19 [History]


Sennosides/Docusate Sodium [Senna Plus Tablet] 1 tab PO BID PRN 04/15/19 [

History]


Vitamin E 400 unit PO DAILY 04/15/19 [History]











Past Medical History


HEENT History: Reports: Cataract, Hard of Hearing, Impaired Vision


Cardiovascular History: Reports: Afib, Arrhythmia, Heart Failure, High 

Cholesterol, Hypertension, Syncope


Respiratory History: Reports: Bronchitis, Recurrent, Pneumonia, Recurrent


Gastrointestinal History: Reports: None


Genitourinary History: Reports: Chronic Renal Insuffiency


OB/GYN History: Reports: Pregnancy


Musculoskeletal History: Reports: Arthritis, Back Pain, Chronic, Gout, Other (

See Below)


Other Musculoskeletal History: Chronic bilateral shoulder pain


Neurological History: Reports: Vertigo


Endocrine/Metabolic History: Reports: Obesity/BMI 30+


Other Endocrine/Metabolic History: gout


Hematologic History: Reports: None


Immunologic History: Reports: None


Oncologic (Cancer) History: Reports: Breast, Other (See Below)


Other Oncologic History: potential breast cancer, recent diagnossis.  breast 

cancer and is to start radiation


Dermatologic History: Reports: Other (See Below)


Other Dermatologic History: cellulitis to leg right





- Infectious Disease History


Infectious Disease History: Reports: Chicken Pox, Measles, Shingles





- Past Surgical History


HEENT Surgical History: Reports: Cataract Surgery


Cardiovascular Surgical History: Reports: None


Female  Surgical History: Reports: Breast Biopsy


Other Female  Surgeries/Procedures: right breast tissue removed for cancer 

purposes


Endocrine Surgical History: Reports: None


Neurological Surgical History: Reports: None


Musculoskeletal Surgical History: Reports: Other (See Below)


Other Musculoskeletal Surgeries/Procedures:: knee repair


Other Oncologic Surgeries/Procedures: titanium tag in breast for marking





Social & Family History





- Family History


Family Medical History: Noncontributory





- Tobacco Use


Smoking Status *Q: Never Smoker


Second Hand Smoke Exposure: No





- Caffeine Use


Caffeine Use: Reports: Coffee





- Recreational Drug Use


Recreational Drug Use: No





Review of Systems





- Review of Systems


Review Of Systems: See Below


Constitutional: Reports: No Symptoms


Eyes: Reports: Other (Swelling and pain to the left side of her head)


Ears: Reports: No Symptoms


Nose: Reports: Other (Swelling and pain with ecchymosis)


Mouth/Throat: Reports: No Symptoms


Respiratory: Reports: No Symptoms


Cardiovascular: Reports: No Symptoms


GI/Abdominal: Reports: No Symptoms





ED EXAM, GENERAL





- Physical Exam


Exam: See Below


Exam Limited By: No Limitations


General Appearance: Alert, No Apparent Distress


Eye Exam: Bilateral Eye: EOMI, PERRL


Ears: Normal External Exam


Nose: Other (Edema and ecchymosis)


Throat/Mouth: Normal Inspection


Head: Other (Edema and skin tears to the left side of her face)


Neck: Normal Inspection, Supple, Non-Tender


Respiratory/Chest: No Respiratory Distress, Lungs Clear, Normal Breath Sounds, 

Other (Pain upon palpation to the left side of her chest)


Cardiovascular: Regular Rate, Rhythm, No Edema, No Murmur


GI/Abdominal: Soft, Non-Tender, No Organomegaly, No Mass


Back Exam: Normal Inspection


Extremities: Other (Edema and pain to both knees.  Good sensation and pulses 

distally.  Multiple large skin tears to the left forearm.  Good sensation and 

pulses distally.)


Neurological: Alert, Oriented, No Motor/Sensory Deficits





Course





- Vital Signs


Last Recorded V/S: 


 Last Vital Signs











Temp  98.4 F   11/02/19 12:35


 


Pulse  88   11/02/19 12:35


 


Resp  16   11/02/19 12:35


 


BP  102/52 L  11/02/19 12:35


 


Pulse Ox  95   11/02/19 12:35














- Orders/Labs/Meds


Orders: 


 Active Orders 24 hr











 Category Date Time Status


 


 Cardiac Monitoring [RC] .AS DIRECTED Care  11/02/19 12:49 Active


 


 Peripheral IV Care [RC] .AS DIRECTED Care  11/02/19 12:50 Active


 


 Forearm 2V Lt [CR] Stat Exams  11/02/19 12:52 Taken


 


 Knee Min 4V Lt [CR] Stat Exams  11/02/19 12:51 Taken


 


 Knee Min 4V Rt [CR] Stat Exams  11/02/19 12:51 Taken


 


 Sodium Chloride 0.9% [Saline Flush] Med  11/02/19 12:49 Active





 10 ml FLUSH ASDIRECTED PRN   


 


 Peripheral IV Insertion Adult [OM.PC] Stat Oth  11/02/19 12:49 Ordered








 Medication Orders





Acetaminophen (Tylenol)  975 mg PO Q8HR CHON


Amiodarone HCl (Cordarone)  200 mg PO DAILY CHON


Bumetanide (Bumex)  1 mg PO BIDDIURETIC CHON


Docusate Sodium (Colace)  100 mg PO BID PRN


   PRN Reason: Constipation


Heparin Sodium (Porcine) (Heparin Sodium)  5,000 units SUBCUT Q8HR CHON


Lactated Ringer's (Ringers, Lactated)  1,000 mls @ 10 mls/hr IV ASDIRECTED CHON


Potassium Chloride/Sodium Chloride (Normal Saline With 20 Meq Kcl)  1,000 mls @ 

500 mls/hr IV ASDIRECTED CHON


Morphine Sulfate (Morphine Pca 30 Mg In 30 Ml)  0 mg IV ASDIRECTED CHON; Protocol


Polyethylene Glycol (Miralax)  17 gm PO BID CHON


Simvastatin (Zocor)  20 mg PO BEDTIME CHON


Sodium Chloride (Saline Flush)  10 ml FLUSH ASDIRECTED PRN


   PRN Reason: Keep Vein Open








Labs: 


 Laboratory Tests











  11/02/19 11/02/19 11/02/19 Range/Units





  13:06 13:06 13:06 


 


WBC  9.29    (3.98-10.04)  K/mm3


 


RBC  2.56 L    (3.98-5.22)  M/mm3


 


Hgb  8.2 L D    (11.2-15.7)  gm/dl


 


Hct  26.2 L    (34.1-44.9)  %


 


MCV  102.3 H    (79.4-94.8)  fl


 


MCH  32.0    (25.6-32.2)  pg


 


MCHC  31.3 L    (32.2-35.5)  g/dl


 


RDW Std Deviation  51.8 H    (36.4-46.3)  fL


 


Plt Count  358  D    (182-369)  K/mm3


 


MPV  9.4    (9.4-12.3)  fl


 


Neut % (Auto)  85.0 H    (34.0-71.1)  %


 


Lymph % (Auto)  6.9 L    (19.3-51.7)  %


 


Mono % (Auto)  7.1    (4.7-12.5)  %


 


Eos % (Auto)  0.3 L    (0.7-5.8)  


 


Baso % (Auto)  0.1    (0.1-1.2)  %


 


Neut # (Auto)  7.89 H    (1.56-6.13)  K/mm3


 


Lymph # (Auto)  0.64 L    (1.18-3.74)  K/mm3


 


Mono # (Auto)  0.66 H    (0.24-0.36)  K/mm3


 


Eos # (Auto)  0.03 L    (0.04-0.36)  K/mm3


 


Baso # (Auto)  0.01    (0.01-0.08)  K/mm3


 


Manual Slide Review  Abnormal smear    


 


PT   11.1   (9.7-12.0)  SECONDS


 


INR   1.02   


 


APTT   24   (22-31)  SECONDS


 


Sodium    139  (136-145)  mEq/L


 


Potassium    3.2 L D  (3.5-5.1)  mEq/L


 


Chloride    102  ()  mEq/L


 


Carbon Dioxide    27  (21-32)  mEq/L


 


Anion Gap    13.2  (5-15)  


 


BUN    62 H  (7-18)  mg/dL


 


Creatinine    2.2 H  (0.55-1.02)  mg/dL


 


Est Cr Clr Drug Dosing    15.75  mL/min


 


Estimated GFR (MDRD)    21  (>60)  mL/min


 


BUN/Creatinine Ratio    28.2 H  (14-18)  


 


Glucose    163 H  ()  mg/dL


 


Calcium    9.1  (8.5-10.1)  mg/dL


 


Total Bilirubin    0.5  (0.2-1.0)  mg/dL


 


AST    57 H  (15-37)  U/L


 


ALT    118 H  (14-59)  U/L


 


Alkaline Phosphatase    84  ()  U/L


 


Total Protein    4.9 L  (6.4-8.2)  g/dl


 


Albumin    2.5 L  (3.4-5.0)  g/dl


 


Globulin    2.4  gm/dL


 


Albumin/Globulin Ratio    1.0  (1-2)  











Meds: 


Medications











Generic Name Dose Route Start Last Admin





  Trade Name Arsenio  PRN Reason Stop Dose Admin


 


Acetaminophen  975 mg  11/02/19 15:30  





  Tylenol  PO   





  Q8HR CHON   





     





     





     





     


 


Amiodarone HCl  200 mg  11/03/19 09:00  





  Cordarone  PO   





  DAILY CHON   





     





     





     





     


 


Bumetanide  1 mg  11/03/19 06:00  





  Bumex  PO   





  BIDDIURETIC CHON   





     





     





     





     


 


Docusate Sodium  100 mg  11/02/19 15:37  





  Colace  PO   





  BID PRN   





  Constipation   





     





     





     


 


Heparin Sodium (Porcine)  5,000 units  11/02/19 15:30  





  Heparin Sodium  SUBCUT   





  Q8HR CHON   





     





     





     





     


 


Lactated Ringer's  1,000 mls @ 10 mls/hr  11/02/19 15:30  





  Ringers, Lactated  IV   





  ASDIRECTED [facility]   





     





     





     





     


 


Potassium Chloride/Sodium Chloride  1,000 mls @ 500 mls/hr  11/02/19 15:45  





  Normal Saline With 20 Meq Kcl  IV   





  ASDIRECTED [facility]   





     





     





     





     


 


Morphine Sulfate  0 mg  11/02/19 15:30  





  Morphine Pca 30 Mg In 30 Ml  IV   





  ASDIRECTED [facility]   





     





     





  Protocol   





     


 


Polyethylene Glycol  17 gm  11/02/19 21:00  





  Miralax  PO   





  BID CHON   





     





     





     





     


 


Simvastatin  20 mg  11/02/19 21:00  





  Zocor  PO   





  BEDTIME [facility]   





     





     





     





     


 


Sodium Chloride  10 ml  11/02/19 12:49  





  Saline Flush  FLUSH   





  ASDIRECTED PRN   





  Keep Vein Open   





     





     





     














Discontinued Medications














Generic Name Dose Route Start Last Admin





  Trade Name Arsenio  PRN Reason Stop Dose Admin


 


Hydromorphone HCl  0.25 mg  11/02/19 15:08  11/02/19 15:30





  Dilaudid  IVPUSH  11/02/19 15:09  0.25 mg





  ONETIME ONE   Administration





     





     





     





     














- Re-Assessments/Exams


Free Text/Narrative Re-Assessment/Exam: 





11/02/19 16:17


I ordered an IV saline lock, CT of her head, facial bones, cervical spine and 

chest.  I also ordered labs and an x-ray of her forearm and bilateral knees.





I do not see a fracture in either knee but degenerative changes.  Her forearm x-

ray shows no fracture.  The CT of her facial bones shows possible nasal bone 

fracture.  Soft tissue swelling within the left frontal scalp and left 

periorbital region.  Degenerative change within both temporomandibular joints.  

No additional facial bone fracture is seen.  The CT of her cervical spine shows 

diffuse degenerative change.  No acute fracture is seen.  CT of her head shows 

senescent change.  Soft tissue swelling is seen within let frontal scalp.  No 

acute intracranial abnormality is identified.  The CT of her chest shows at 

least 2 left-sided rib fractures showing slight displacement.  No pneumothorax 

is seen at this time within the chest.  Questionable nondisplaced sternal 

fracture.  Severe degenerative change within the shoulders.  Mild coronary 

artery calcification.  Large calcified gallstones within the gallbladder.





Her WBC looks good.  Her Hgb is low at 8.2.  She has a history of anemia.  Her 

PT and PTT look good.  Her K is a little low at 3.2.  Her creatinine is 

elevated at 2.2.  Her BUN is elevated at 62.  Her glucose is 163.  Her AST is 

57.  Her ALT was elevated at 118.





I do not feel I can fix the large lacerations here and I have concerns with her 

rib fractures.  I do not feel she will do well at home.  I called Dr Orozco 

and he came to see the patient.  He will admit her and debried the wounds 

tomorrow.





Departure





- Departure


Time of Disposition: 16:25


Disposition: Admitted As Inpatient 66


Condition: Good


Clinical Impression: 


 Skin tear





Fall


Qualifiers:


 Encounter type: initial encounter Qualified Code(s): W19.XXXA - Unspecified 

fall, initial encounter





Laceration of left forearm


Qualifiers:


 Encounter type: initial encounter Qualified Code(s): S51.812A - Laceration 

without foreign body of left forearm, initial encounter





Ribs, multiple fractures


Qualifiers:


 Encounter type: initial encounter Fracture type: closed Laterality: left 

Qualified Code(s): S22.42XA - Multiple fractures of ribs, left side, initial 

encounter for closed fracture





Facial contusion


Qualifiers:


 Encounter type: initial encounter Qualified Code(s): S00.83XA - Contusion of 

other part of head, initial encounter





Bilateral knee pain


Qualifiers:


 Chronicity: acute Qualified Code(s): M25.561 - Pain in right knee; M25.562 - 

Pain in left knee








- Discharge Information





- My Orders


Last 24 Hours: 


My Active Orders





11/02/19 12:49


Cardiac Monitoring [RC] .AS DIRECTED 


Sodium Chloride 0.9% [Saline Flush]   10 ml FLUSH ASDIRECTED PRN 


Peripheral IV Insertion Adult [OM.PC] Stat 





11/02/19 12:50


Peripheral IV Care [RC] .AS DIRECTED 





11/02/19 12:51


Knee Min 4V Lt [CR] Stat 


Knee Min 4V Rt [CR] Stat 





11/02/19 12:52


Forearm 2V Lt [CR] Stat 














- Assessment/Plan


Last 24 Hours: 


My Active Orders





11/02/19 12:49


Cardiac Monitoring [RC] .AS DIRECTED 


Sodium Chloride 0.9% [Saline Flush]   10 ml FLUSH ASDIRECTED PRN 


Peripheral IV Insertion Adult [OM.PC] Stat 





11/02/19 12:50


Peripheral IV Care [RC] .AS DIRECTED 





11/02/19 12:51


Knee Min 4V Lt [CR] Stat 


Knee Min 4V Rt [CR] Stat 





11/02/19 12:52


Forearm 2V Lt [CR] Stat

## 2019-11-02 NOTE — CT
CT chest

 

Technique: Multiple axial sections through the chest were obtained.  

Intravenous contrast was not utilized.

 

Comparison: No prior chest CT is available.

 

Findings: Fractures are seen within the left fourth and fifth ribs 

which show mild displacement.  No other discrete fracture is 

appreciated within the ribs.  Severe degenerative change is noted 

within both shoulders.

 

Diffuse disc space narrowing and endplate spurring is noted within the

 spine.  Nothing acute is definitely appreciated within the thoracic 

spine.  

 

Reconstructed sagittal images of the sternum shows minimal deformity 

within the lower sternum suspicious for a nondisplaced sternal 

fracture.

 

Mediastinum and hilar regions appear within normal limits.  Mild 

coronary artery calcification is seen.  No pericardial thickening is 

seen.  Large calcified gallstone is noted within the gallbladder 

measuring 1.7 cm.

 

No pneumothorax is seen.  Lungs show nothing acute.

 

Impression:

1.  At least 2 left-sided rib fractures showing slight displacement.  

No pneumothorax is seen at this time within the chest.

2.  Questionable nondisplaced sternal fracture.

3.  Severe degenerative change within the shoulders.  Mild coronary 

artery calcification.  Large calcified gallstone within the 

gallbladder.

 

Diagnostic code #3

## 2019-11-02 NOTE — CT
CT facial bones

 

Technique: Multiple axial sections through the facial bones were 

obtained.  Reconstructed coronal and sagittal images were reviewed.

 

Comparison: No prior's facial bone exam is available.

 

Findings: Soft tissue swelling is again seen within the left frontal 

scalp.  Minimal areas of mucosal thickening are seen within the 

ethmoid and right maxillary sinus.  No air-fluid levels are seen to 

indicate acute sinus disease.

 

Minimal deformity of the nasal bone suspicious for fracture is 

present.  No additional facial bone fracture is appreciated.

 

Soft tissue swelling is seen within the left periorbital region.

 

Mastoid sinuses are clear.  Degenerative change is noted within both 

temporomandibular joints.

 

Impression:

1.  Possible nasal bone fracture.  Soft tissue swelling within the 

left frontal scalp and left periorbital region.

2.  Degenerative change within both temporomandibular joints.

3.  No additional facial bone fracture is seen.

 

Diagnostic code #3

## 2019-11-02 NOTE — PCM.HP.2
H&P History of Present Illness





- General


Date of Service: 11/02/19


Admit Problem/Dx: 


 Admission Diagnosis/Problem





Admission Diagnosis/Problem      Fall at nursing home





blunt trauma, low impact from fall, on eliquis


Source of Information: Patient, Family


History Limitations: Reports: No Limitations





- History of Present Illness


Onset of Symptoms: Reports: Today


Duration of Symptoms: Reports: Hour(s):


Location: Reports: Face, Chest, Upper Extremity, Left


Severity: Moderate


Context: Reports: Trauma


Other HPI/Comments: 





83 yo woman with history of A fib on amiodarone and eliquis presents after fall 

from standing on to floor this morning. She sustained trauma to the left face, 

chest, and left upper extremity. Imaging shows no intracranial hemorrhage, but 

she does have a few rib fractures on the left and avulsion of the skin on the 

left forearm. She did not lose consciousness prior to the fall or after. 


  ** Left Arm


Pain Score (Numeric/FACES): 9





  ** Left Chest


Pain Score (Numeric/FACES): 9





  ** Forehead


Pain Score (Numeric/FACES): 8





- Related Data


Allergies/Adverse Reactions: 


 Allergies











Allergy/AdvReac Type Severity Reaction Status Date / Time


 


codeine Allergy  Rash Verified 11/02/19 13:01


 


iodine Allergy  Rash Verified 11/02/19 13:01


 


metoprolol [From Toprol XL] Allergy  swelling Verified 11/02/19 13:01





   of lips  


 


naproxen [From Naprelan] Allergy  Rash Verified 11/02/19 13:01


 


prednisone Allergy  Other Verified 11/02/19 13:01


 


propoxyphene Allergy  urine Verified 11/02/19 13:01





   retention  











Home Medications: 


 Home Meds





Acetaminophen [Tylenol] 325 mg PO Q6HR PRN 12/23/18 [History]


Allopurinol [Zyloprim] 100 mg PO BID 12/23/18 [History]


Bumetanide [Bumex] 1 mg PO BID 12/23/18 [History]


Loratadine 10 mg PO DAILY PRN 12/23/18 [History]


Multivitamin [Multi-Vitamin Daily] 1 tab PO DAILY 12/23/18 [History]


Simvastatin 20 mg PO DAILY 12/23/18 [History]


traMADol [Ultram] 50 mg PO BID PRN 12/23/18 [History]


Benzonatate [Tessalon Perle] 100 mg PO TID PRN 12/24/18 [History]


Cetirizine HCl [All Day Allergy] 10 mg PO DAILY PRN 12/24/18 [History]


Niacin 500 mg PO DAILY 12/24/18 [History]


Amiodarone [Cordarone] 200 mg PO DAILY #30 tab 12/27/18 [Rx]


Apixaban [Eliquis] 2.5 mg PO BID #30 12/27/18 [Rx]


Ascorbic Acid 500 mg PO BID #30 12/27/18 [Rx]


Ferrous Sulfate [Iron] 325 mg PO BID #60 tablet 12/27/18 [Rx]


Acetaminophen [Tylenol] 650 mg PO BID 04/15/19 [History]


Famotidine [Pepcid] 20 mg PO DAILY 04/15/19 [History]


Sennosides/Docusate Sodium [Senna Plus Tablet] 1 tab PO BID PRN 04/15/19 [

History]


Vitamin E 400 unit PO DAILY 04/15/19 [History]











Past Medical History


HEENT History: Reports: Cataract, Hard of Hearing, Impaired Vision


Cardiovascular History: Reports: Afib, Arrhythmia, Heart Failure, High 

Cholesterol, Hypertension, Syncope


Respiratory History: Reports: Bronchitis, Recurrent, Pneumonia, Recurrent


Gastrointestinal History: Reports: None


Genitourinary History: Reports: Chronic Renal Insuffiency


OB/GYN History: Reports: Pregnancy


Musculoskeletal History: Reports: Arthritis, Back Pain, Chronic, Gout, Other (

See Below)


Other Musculoskeletal History: Chronic bilateral shoulder pain


Neurological History: Reports: Vertigo


Endocrine/Metabolic History: Reports: Obesity/BMI 30+


Other Endocrine/Metabolic History: gout


Hematologic History: Reports: None


Immunologic History: Reports: None


Oncologic (Cancer) History: Reports: Breast, Other (See Below)


Other Oncologic History: potential breast cancer, recent diagnossis.  breast 

cancer and is to start radiation


Dermatologic History: Reports: Other (See Below)


Other Dermatologic History: cellulitis to leg right





- Infectious Disease History


Infectious Disease History: Reports: Chicken Pox, Measles, Shingles





- Past Surgical History


HEENT Surgical History: Reports: Cataract Surgery


Cardiovascular Surgical History: Reports: None


Female  Surgical History: Reports: Breast Biopsy


Other Female  Surgeries/Procedures: right breast tissue removed for cancer 

purposes


Endocrine Surgical History: Reports: None


Neurological Surgical History: Reports: None


Musculoskeletal Surgical History: Reports: Other (See Below)


Other Musculoskeletal Surgeries/Procedures:: knee repair


Other Oncologic Surgeries/Procedures: titanium tag in breast for marking





Social & Family History





- Family History


Family Medical History: Noncontributory





- Tobacco Use


Smoking Status *Q: Never Smoker


Second Hand Smoke Exposure: No





- Caffeine Use


Caffeine Use: Reports: Coffee





- Recreational Drug Use


Recreational Drug Use: No





H&P Review of Systems





- Review of Systems:


Review Of Systems: See Below


General: Reports: No Symptoms


HEENT: Reports: Eye Pain


Pulmonary: Reports: Pleuritic Chest Pain


Cardiovascular: Reports: Chest Pain


Gastrointestinal: Reports: No Symptoms


Genitourinary: Reports: No Symptoms


Musculoskeletal: Reports: Shoulder Pain, Arm Pain


Skin: Reports: Bruising, Wound


Psychiatric: Reports: No Symptoms


Neurological: Reports: No Symptoms


Hematologic/Lymphatic: Reports: Easy Bleeding, Easy Bruising


Immunologic: Reports: No Symptoms





Exam





- Exam


Exam: See Below





- Vital Signs


Vital Signs: 


 Last Vital Signs











Temp  36.9 C   11/02/19 12:35


 


Pulse  88   11/02/19 12:35


 


Resp  16   11/02/19 12:35


 


BP  102/52 L  11/02/19 12:35


 


Pulse Ox  95   11/02/19 12:35











Weight: 71.214 kg





- Exam


General: Alert, Mild Distress


HEENT: Conjunctiva Clear


Neck: Supple


Lungs: Clear to Auscultation


Cardiovascular: Regular Rate


GI/Abdominal Exam: Non-Tender


 (Female) Exam: Deferred


Rectal (Female) Exam: Deferred


Extremities: Other


Skin: Wound


Neurological: Strength Equal Bilateral


Neuro Extensive - Mental Status: Alert


Psychiatric: Alert, Normal Mood





- Patient Data


Lab Results Last 24 hrs: 


 Laboratory Results - last 24 hr











  11/02/19 11/02/19 11/02/19 Range/Units





  13:06 13:06 13:06 


 


WBC  9.29    (3.98-10.04)  K/mm3


 


RBC  2.56 L    (3.98-5.22)  M/mm3


 


Hgb  8.2 L D    (11.2-15.7)  gm/dl


 


Hct  26.2 L    (34.1-44.9)  %


 


MCV  102.3 H    (79.4-94.8)  fl


 


MCH  32.0    (25.6-32.2)  pg


 


MCHC  31.3 L    (32.2-35.5)  g/dl


 


RDW Std Deviation  51.8 H    (36.4-46.3)  fL


 


Plt Count  358  D    (182-369)  K/mm3


 


MPV  9.4    (9.4-12.3)  fl


 


Neut % (Auto)  85.0 H    (34.0-71.1)  %


 


Lymph % (Auto)  6.9 L    (19.3-51.7)  %


 


Mono % (Auto)  7.1    (4.7-12.5)  %


 


Eos % (Auto)  0.3 L    (0.7-5.8)  


 


Baso % (Auto)  0.1    (0.1-1.2)  %


 


Neut # (Auto)  7.89 H    (1.56-6.13)  K/mm3


 


Lymph # (Auto)  0.64 L    (1.18-3.74)  K/mm3


 


Mono # (Auto)  0.66 H    (0.24-0.36)  K/mm3


 


Eos # (Auto)  0.03 L    (0.04-0.36)  K/mm3


 


Baso # (Auto)  0.01    (0.01-0.08)  K/mm3


 


Manual Slide Review  Abnormal smear    


 


PT   11.1   (9.7-12.0)  SECONDS


 


INR   1.02   


 


APTT   24   (22-31)  SECONDS


 


Sodium    139  (136-145)  mEq/L


 


Potassium    3.2 L D  (3.5-5.1)  mEq/L


 


Chloride    102  ()  mEq/L


 


Carbon Dioxide    27  (21-32)  mEq/L


 


Anion Gap    13.2  (5-15)  


 


BUN    62 H  (7-18)  mg/dL


 


Creatinine    2.2 H  (0.55-1.02)  mg/dL


 


Est Cr Clr Drug Dosing    15.75  mL/min


 


Estimated GFR (MDRD)    21  (>60)  mL/min


 


BUN/Creatinine Ratio    28.2 H  (14-18)  


 


Glucose    163 H  ()  mg/dL


 


Calcium    9.1  (8.5-10.1)  mg/dL


 


Total Bilirubin    0.5  (0.2-1.0)  mg/dL


 


AST    57 H  (15-37)  U/L


 


ALT    118 H  (14-59)  U/L


 


Alkaline Phosphatase    84  ()  U/L


 


Total Protein    4.9 L  (6.4-8.2)  g/dl


 


Albumin    2.5 L  (3.4-5.0)  g/dl


 


Globulin    2.4  gm/dL


 


Albumin/Globulin Ratio    1.0  (1-2)  











Result Diagrams: 


 11/02/19 13:06





 11/02/19 13:06





*Q Meaningful Use (ADM)





- VTE *Q


VTE Mechanical Contraindications *Q: At Risk for Falls


VTE Pharmacological Contraindications *Q: Risk of Bleeding





- VTE Risk Assess *Q


Each Risk Factor Represents 3 Points: Age 75 Years or Greater


Total Score 3 Point Risk Factors: 3


Each Risk Factor Represents 5 Points: Multiple Trauma, Less than 1 Month


Total Score 5 Point Risk Factors: 5


Problem List Initiated/Reviewed/Updated: Yes


Orders Last 24hrs: 


 Active Orders 24 hr











 Category Date Time Status


 


 Patient Status [ADT] Routine ADT  11/02/19 15:25 Ordered


 


 Activity as Tolerated [RC] .Routine Care  11/02/19 15:25 Ordered


 


 Antiembolic Devices [RC] PER UNIT ROUTINE Care  11/02/19 15:27 Ordered


 


 Cardiac Monitoring [RC] .AS DIRECTED Care  11/02/19 12:49 Active


 


 Chest Physiotherapy [RT Chest Physiotherapy] [RC] Care  11/02/19 15:29 Ordered





 ASDIRECTED   


 


 Communication Order [RC] PER UNIT ROUTINE Care  11/02/19 15:38 Ordered


 


 Oxygen Therapy [RC] PRN Care  11/02/19 15:25 Ordered


 


 Oxygen Therapy [RC] PRN Care  11/02/19 15:32 Ordered


 


 Peripheral IV Care [RC] .AS DIRECTED Care  11/02/19 12:50 Active


 


 RT Incentive Spirometry [RC] Q1HWA Care  11/02/19 15:25 Ordered


 


 RT Incentive Spirometry [RC] Q1HWA Care  11/02/19 15:32 Ordered


 


 Up to Chair [RC] ASDIRECTED Care  11/02/19 15:32 Ordered


 


 Vital Signs [RC] Q15M Care  11/02/19 15:25 Ordered


 


 Vital Signs [RC] Q8H Care  11/02/19 15:25 Ordered


 


 Regular Diet [DIET] Diet  11/02/19 Breakfast Ordered


 


 Chest 2V [CR] Stat Exams  11/02/19 15:31 Ordered


 


 Forearm 2V Lt [CR] Stat Exams  11/02/19 12:52 Taken


 


 Knee Min 4V Lt [CR] Stat Exams  11/02/19 12:51 Taken


 


 Knee Min 4V Rt [CR] Stat Exams  11/02/19 12:51 Taken


 


 BASIC METABOLIC PANEL,BMP [CHEM] AM Lab  11/03/19 05:11 Ordered


 


 CBC WITH AUTO DIFF [HEME] AM Lab  11/03/19 05:11 Ordered


 


 Acetaminophen [Tylenol] Med  11/02/19 15:30 Ordered





 975 mg PO Q8H   


 


 Amiodarone [Cordarone] Med  11/03/19 09:00 Ordered





 200 mg PO DAILY   


 


 Bumetanide [Bumex] Med  11/03/19 06:00 Ordered





 1 mg PO BIDDIURETIC   


 


 Docusate Sodium [Colace] Med  11/02/19 15:37 Ordered





 100 mg PO BID PRN   


 


 Heparin Sodium Med  11/02/19 15:30 Ordered





 5,000 units SUBCUT Q8H   


 


 Lactated Ringers [Ringers, Lactated] 1,000 ml Med  11/02/19 15:30 Ordered





 IV ASDIRECTED   


 


 Morphine PF [Morphine PCA 30 MG in 30 ML] Med  11/02/19 15:30 Ordered





 See Protocol  IV ASDIRECTED   


 


 NS + KCl 20mEq/L [Normal Saline with 20 mEq KCl] 1,000 Med  11/02/19 15:45 

Ordered





 ml   





 IV ASDIRECTED   


 


 Polyethylene Glycol 3350 [MiraLAX] Med  11/02/19 21:00 Ordered





 17 gm PO BID   


 


 Simvastatin [Zocor] Med  11/02/19 21:00 Ordered





 20 mg PO BEDTIME   


 


 Sodium Chloride 0.9% [Saline Flush] Med  11/02/19 12:49 Active





 10 ml FLUSH ASDIRECTED PRN   


 


 Peripheral IV Insertion Adult [OM.PC] Stat Oth  11/02/19 12:49 Ordered


 


 Sequential Compression Device [OM.PC] Routine Oth  11/02/19 15:25 Ordered


 


 Resuscitation Status Routine Resus Stat  11/02/19 15:25 Ordered








 Medication Orders





Acetaminophen (Tylenol)  975 mg PO Q8HR CHON


Amiodarone HCl (Cordarone)  200 mg PO DAILY CHON


Bumetanide (Bumex)  1 mg PO BIDDIURETIC CHON


Docusate Sodium (Colace)  100 mg PO BID PRN


   PRN Reason: Constipation


Heparin Sodium (Porcine) (Heparin Sodium)  5,000 units SUBCUT Q8HR CHON


Lactated Ringer's (Ringers, Lactated)  1,000 mls @ 10 mls/hr IV ASDIRECTED CHON


Potassium Chloride/Sodium Chloride (Normal Saline With 20 Meq Kcl)  1,000 mls @ 

500 mls/hr IV ASDIRECTED CHON


Morphine Sulfate (Morphine Pca 30 Mg In 30 Ml)  0 mg IV ASDIRECTED CHON; Protocol


Polyethylene Glycol (Miralax)  17 gm PO BID CHON


Simvastatin (Zocor)  20 mg PO BEDTIME CHON


Sodium Chloride (Saline Flush)  10 ml FLUSH ASDIRECTED PRN


   PRN Reason: Keep Vein Open








Assessment/Plan Comment:: 





Blunt trauma, low impact, in geriatric patient with a few left sided rib 

fractures, on anticoagulant for atrial fibrillation.


Plan for admission to ICU for observation, pain control with PCA and pulmonary 

toilet. 


Restart home medications, will restart eilquis tomorrow


Plan for debridement of left arm wound tomorrow


Wound can be dressed with bacitracin ointment and adaptec gauze or equivalent 

non-stick dressing


Repeat CXR in AM


Chest PT consult

## 2019-11-02 NOTE — CT
CT cervical spine

 

Technique: Multiple axial sections were obtained from above C1 

inferiorly to the top of T3.  Reconstructed sagittal and coronal 

images were obtained.

 

Comparison: No prior cervical spine imaging is available.

 

Findings: Severe disc space narrowing is noted at C2-C3 and C3-C4.  

Retrolisthesis is seen of C3 on C4 by about 4 mm.  Mild disc space 

narrowing is noted at C4-C5 and C5-C6 with severe disc space narrowing

 at C6-C7.  Mild disc space narrowing is noted throughout the other 

visualized levels.  Mild diffuse anterior osteophytes are seen.  

Posterior osteophytes are noted at C6-C7.

 

Diffuse degenerative change is noted within the apophyseal joints.

 

Moderate to severe left-sided neural foraminal stenosis is noted at 

C4-C5 with mild right-sided neural foraminal stenosis.

 

Moderate neural foraminal stenosis is noted on the left-sided C6-C7.  

Other neural foramina appear to be fairly well patent.

 

Old fracture deformity which appears healed is seen within the 

posterior left third rib.  No acute fracture is appreciated within the

 cervical spine.

 

Impression:

1.  Diffuse degenerative change as noted above.

2.  No acute fracture is seen.

 

Diagnostic code #2

## 2019-11-03 NOTE — PCM.SN
- Free Text/Narrative


Note: 





S: inadequate pain control, poor rest overnight





O:


AF_ SBP 90s, normal HR, normal RR, SPO2 > 90% on room air


Gen: no distress


HEENT: hard of hearing, hearing aids in place, bilateral periorbital ecchymosis 

and swelling. Pupils equal and reactive. Tongue protrudes without deviation. 


Pulm: clear breath sounds, normal respiratory effort


CV: RRR


Abd: soft


Msk: No bony deformity at extremities


Skin: extensive soft tissue avulsion along dorsal aspect of left forearm, with 

subcutaneous fat and tendinous tissue exposed at distal aspect of ulna. 


Neuro: motor and sensory function grossly intact





A:


85 yo woman s/p fall, on eliquis, with two left rib fractures and soft tissue 

injury to left forearm. No intracranial hemorrhage on imaging. Stable overnight.





Plan:


 Hgb 7.4 this AM, with lower blood pressure and elevated creatinine I think the 

patient would benefit from 1 u pRBC followed by 20 mg IV lasix to help avoid 

fluid overload. CXR this AM pending. Wound washed out, with deeper area 

reapproximated with steristrips. Bacitracin and xeroform gauze applied. 

Continue pulmonary toilet, vitals q4, physical therapy consult. Anticipate 

transfer to SNF when medically ready. Repeat labs in AM.

## 2019-11-03 NOTE — CR
Chest: 2 views of the chest were obtained.

 

Comparison: Prior chest CT study of 11/02/19.

 

Findings: Displaced fracture is noted within the left 4th and 5th 

ribs.  These were seen on prior chest CT exam.  No definite 

pneumothorax is seen at this time.  Lungs show no acute parenchymal 

change.  Heart size is normal.  Severe degenerative change is noted 

within the spine.  Questionable small pleural effusions are present.  

Mild degenerative change is seen within the spine.

 

Impression:

1.  2 left upper rib fractures without definite pneumothorax being 

seen at this time.

2.  Other findings believed to be chronic.

 

Diagnostic code #3

## 2019-11-03 NOTE — CR
Left forearm:  Two views of the left forearm were obtained.

 

Comparison: Previous left forearm study of 12/23/18.

 

Degenerative change is seen within the CMC joint of the thumb.  

Vascular calcification is noted.  Osteopenia is present.  No acute 

fracture or other bony abnormality is appreciated.

 

Impression:

1.  Findings as noted above.

2.  No acute bony abnormality is appreciated on two-view left forearm 

exam.

 

Diagnostic code #2

## 2019-11-03 NOTE — CR
Left knee:  Four views of the left knee were obtained.

 

Comparison: No prior left knee exam.

 

Severe lateral joint space narrowing is noted with lateral 

osteophytes.  Moderate medial joint space narrowing is noted.  Slight 

chondrocalcinosis is noted within the medial meniscus.  No joint 

effusion is seen.  Osteopenia is noted.  No acute fracture or other 

bony abnormality is seen.

 

Impression:

1.  Degenerative change as noted above.

2.  Nothing acute is appreciated.

 

Diagnostic code #2

## 2019-11-04 NOTE — CR
Chest: Portable view of the chest was obtained.

 

Comparison: Prior chest x-ray of 11/03/19.

 

Heart size is normal.  Upper mediastinum is within normal limits.  

Lungs show no acute parenchymal change.  Bony structures show 

degenerative change within both shoulders which is stable.  Mild 

degenerative change is scattered within the spine which is stable.  

Stable left upper rib fractures are noted.  No pneumothorax is seen.

 

Impression:

1.  Stable left upper rib fractures with no pneumothorax.

2.  Nothing acute is otherwise seen.

 

Diagnostic code #2

## 2019-11-04 NOTE — PCM.SN
- Free Text/Narrative


Note: 





S: got some rest overnight. Reports 9/10 left sided chest pain. Not up out of 

bed except to go to bathroom. Not using incentive spirometer. 





O:


AF-VSS


Gen: up in chair, no acute distress


HEENT: hard of hearing, bilateral periorbital ecchymosis and swelling


Pulm: IS pulls ~1000 mL. [place]. 


CV: RRR


Abd: Soft


MSK: extensive avulsion of soft tissue along dorsal aspect of left forearm


Neuro: motor and sensory function grossly intact


Psych: oriented, appropriate





A:


Elderly frail woman s/p fall from standing with two left sided rib fractures 

and left forearm wound. Was taking eliquis for A fib. Responded well to 1 u 

pRBC and lasix yesterday. 





P:The priority now is good pain control so she may participate in effective 

pulmonary toilet. She needs to continue to use the incentive spirometer hourly 

while awake and starting walking the halls with assistance. Physical therapy 

consult placed. Plan on SNF when medically ready for discharge.

## 2019-11-05 NOTE — [id number]
**Discharge Summary





- Hospital Course


Free Text/Narrative:: 





Admitted as a trauma from the ER after presenting with blunt trauma from fall 

from standing. Was taking eliquis for atrial fibrillation. No intracranial 

hemorrhage on CT scan noted. She had soft tissue avulsion of the left forearm, 

two rib fractures without penumo or hemothorax. She was admitted for monitoring

, pain control and pulmonary toilet as well as wound care. She received one 

unit of packed red cells for a Hgb <8 g/dL and had a good response. She was 

diuresed with 20 mg IV lasix following transfusion. Her creatinine on admission 

was 2.2, which came down two consecutive days with gentle fluid resuscitation. 

She did well from a respiratory standpoint and pain was managed with oral 

medication. Eliquis was restarted on HD 3. She was ambulatory with assistance 

and worked with physical and occupational therapy. It was recommended she 

transfer to a skilled nursing facility once medically ready for discharge. 


Diagnosis: Stroke: No





- Discharge Data


Discharge Date: 11/05/19


Discharge Disposition: DC/Tfer to SNF 03


Condition: Stable





- Referral to Home Health


Primary Care Physician: 


Pravin Horta MD








- Patient Summary/Data


Consults: 


 Consultations





11/03/19 08:31


Consult to Physical Therapy [PT Evaluation and Treatment] [CONS] Routine 





11/04/19 10:51


Consult to Occupational Therapy [OT Evaluation and Treatment] [CONS] Routine 











Hospital Course: 





see free text/narrative








- Patient Instructions


Diet: Usual Diet as Tolerated


Activity: As Tolerated


Wound/Incision Care: Keep Operative Site/Wound Site Clean and Dry, Change 

Dressing Daily





- Discharge Plan


*PRESCRIPTION DRUG MONITORING PROGRAM REVIEWED*: Not Applicable


*COPY OF PRESCRIPTION DRUG MONITORING REPORT IN PATIENT CLAY: Not Applicable


Prescriptions/Med Rec: 


oxyCODONE 5 mg PO Q4H PRN #40 tab


 PRN Reason: Pain


Home Medications: 


 Home Meds





Acetaminophen [Tylenol] 325 mg PO Q6HR PRN 12/23/18 [History]


Allopurinol [Zyloprim] 100 mg PO BID 12/23/18 [History]


Bumetanide [Bumex] 1 mg PO BID 12/23/18 [History]


Loratadine 10 mg PO DAILY PRN 12/23/18 [History]


Multivitamin [Multi-Vitamin Daily] 1 tab PO DAILY 12/23/18 [History]


Simvastatin 20 mg PO BEDTIME 12/23/18 [History]


traMADol [Ultram] 50 mg PO BID PRN 12/23/18 [History]


Benzonatate [Tessalon Perle] 100 mg PO TID PRN 12/24/18 [History]


Cetirizine HCl [All Day Allergy] 10 mg PO DAILY 12/24/18 [History]


Niacin 500 mg PO DAILY 12/24/18 [History]


Amiodarone [Cordarone] 200 mg PO DAILY #30 tab 12/27/18 [Rx]


Apixaban [Eliquis] 2.5 mg PO BID #30 12/27/18 [Rx]


Ascorbic Acid 500 mg PO BID #30 12/27/18 [Rx]


Ferrous Sulfate [Iron] 325 mg PO BID #60 tablet 12/27/18 [Rx]


Acetaminophen [Tylenol] 650 mg PO BID 04/15/19 [History]


Famotidine [Pepcid] 20 mg PO DAILY 04/15/19 [History]


Sennosides/Docusate Sodium [Senna Plus Tablet] 1 tab PO BID PRN 04/15/19 [

History]


Vitamin E 400 unit PO DAILY 04/15/19 [History]


oxyCODONE 5 mg PO Q4H PRN #40 tab 11/05/19 [Rx]








Oxygen Therapy Mode: Room Air


Patient Handouts:  Rib Fracture, Heart Failure


Forms:  ED Department Discharge


Referrals: 


Pravin Horta MD [Primary Care Provider] - 





- Discharge Summary/Plan Comment


DC Time >30 min.: Yes


Discharge Summary/Plan Comment: 





To SNF for continued management after blunt trauma. Follow up in surgery clinic 

in 3-4 weeks or sooner if necessary. 





- Patient Data


Vitals - Most Recent: 


 Last Vital Signs











Temp  37.1 C   11/05/19 05:53


 


Pulse  91   11/05/19 05:53


 


Resp  18   11/05/19 05:53


 


BP  130/72   11/04/19 22:00


 


Pulse Ox  95   11/05/19 05:53











Weight - Most Recent: 71.668 kg


I&O - Last 24 hours: 


 Intake & Output











 11/04/19 11/05/19 11/05/19





 22:59 06:59 14:59


 


Intake Total 1090 396 


 


Output Total 650 800 


 


Balance 440 -404 











Med Orders - Current: 


 Current Medications





Acetaminophen (Tylenol)  975 mg PO Q8HR [facility]


   Last Admin: 11/05/19 05:48 Dose:  975 mg


Amiodarone HCl (Cordarone)  200 mg PO DAILY [facility]


   Last Admin: 11/05/19 08:57 Dose:  200 mg


Apixaban (Eliquis)  2.5 mg PO BID [facility]


   Last Admin: 11/05/19 08:57 Dose:  2.5 mg


Bumetanide (Bumex)  1 mg PO BIDDIURETIC [facility]


   Last Admin: 11/05/19 05:47 Dose:  1 mg


Docusate Sodium (Colace)  100 mg PO BID PRN


   PRN Reason: Constipation


Fentanyl (Sublimaze)  25 mcg IVPUSH Q2H PRN


   PRN Reason: Pain (severe 7-10)


   Last Admin: 11/05/19 08:59 Dose:  25 mcg


Lactated Ringer's (Ringers, Lactated)  1,000 mls @ 10 mls/hr IV ASDIRECTED [facility]


   Last Admin: 11/04/19 21:20 Dose:  10 mls/hr


Oxycodone HCl (Oxycodone)  5 mg PO Q6H PRN


   PRN Reason: Pain (moderate 4-6)


   Last Admin: 11/05/19 05:48 Dose:  5 mg


Polyethylene Glycol (Miralax)  17 gm PO BID [facility]


   Last Admin: 11/05/19 08:58 Dose:  17 gm


Simvastatin (Zocor)  20 mg PO BEDTIME [facility]


   Last Admin: 11/04/19 21:18 Dose:  20 mg


Sodium Chloride (Saline Flush)  10 ml FLUSH ASDIRECTED PRN


   PRN Reason: Keep Vein Open





Discontinued Medications





Bacitracin (Bacitracin Oint)  0 gm TOP ONETIME ONE


   Stop: 11/03/19 08:16


   Last Admin: 11/03/19 08:57 Dose:  Not Given


Furosemide (Lasix)  20 mg IVPUSH ONETIME ONE


   Stop: 11/03/19 12:01


   Last Admin: 11/03/19 14:14 Dose:  20 mg


Heparin Sodium (Porcine) (Heparin Sodium)  5,000 units SUBCUT Q8HR [facility]


   Last Admin: 11/04/19 13:56 Dose:  5,000 units


Hydromorphone HCl (Dilaudid)  0.25 mg IVPUSH ONETIME ONE


   Stop: 11/02/19 15:09


   Last Admin: 11/02/19 15:30 Dose:  0.25 mg


Potassium Chloride/Sodium Chloride (Normal Saline With 20 Meq Kcl)  1,000 mls @ 

500 mls/hr IV ASDIRECTED [facility]ON


   Last Admin: 11/02/19 17:39 Dose:  500 mls/hr


Sodium Chloride (Normal Saline)  250 mls @ 50 mls/hr IV ASDIRECTED [facility]ON


   Last Admin: 11/03/19 10:17 Dose:  50 mls/hr


Morphine Sulfate (Morphine Pca 30 Mg In 30 Ml)  0 mg IV ASDIRECTED [facility]ON; Protocol











*Q Meaningful Use (DIS)





- VTE *Q


VTE Mechanical Contraindications *Q: At Risk for Falls


VTE Pharmacological Contraindications *Q: Risk of Bleeding

## 2019-12-17 NOTE — PCM.HP.2
H&P History of Present Illness





- General


Date of Service: 12/17/19


Admit Problem/Dx: 


Pneumonia 





Source of Information: Patient, Old Records, Provider, RN, RN Notes Reviewed


History Limitations: Reports: No Limitations





- History of Present Illness


Initial Comments - Free Text/Narative: 


Kamla Oakes is an 83 yo female who presents to our ED on 12/17/19 with 

shortness of breath. She currently resides at [facility].  She 

is noted to have a productive bilateral cough and fever.  Symptoms reportedly 

began with the last 48 hours per SNF report, however the patient reports she 

has not felt well for the past few weeks.  She been given Tylenol at the 

nursing home with some relief.  Reports she did have a flu shot this year.  She'

s had very poor oral intake today but she did eat well yesterday.  She reports 

no vomiting.  She does have multiple ecchymosis to her face and bilateral upper 

extremities, along with both knees from a fall that occurred on November 2.  

She is noted to have a very large skin tear on the extensor surface of her 

right forearm.





In the ED temperature is 98.3 Fahrenheit.  Pulse 86.  Respirations 16.  

Saturations are 92%.  Labs were obtained showing mild leukocytosis at 11.67 

with a hemoglobin of 11.6 and hematocrit of 36.2.  Blood sugar 259,000.  

Neutrophils are elevated at 88% however there is no bandemia.  ESR is high at 

54.  8.  INR 0.99.  APTT is 27.  Sodium is 137.  Potassium 4.4.  Anion gap was 

high at 15.4.  BUN is 73.  Creatinine 1.7.  EGFR is 29.  Lactic acid is 1.2.  

Magnesium is low at 1.7.  AST is 96, , alkaline phosphatase 108.  CK-MB 

is 0.6.  Troponin is negative at less than 0.017.  CRP is 3.9.  ProBNP is 46.  

Protein is low at 6.3.  Blood cultures are obtained and a sputum culture is 

ordered.  Twelve-lead EKG shows ectopic atrial rhythm at a rate of 78 bpm.  Q 

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  There is T-

wave flattening noted in aVL.Q waves are noted in III and aVF consider old 

inferior wall MI.  QTC is mildly prolonged at 477.  She is given 650 mg Tylenol 

and a 3 ml DuoNeb. She is placed on 2L O2. She started on 2 g of IV Rocephin.  

She is given a 500 mL saline bolus and started on 100 mils an hour of D5NS.  

CXR is obtained showing incidental findings with nothing acute.





History of: A. fib, HLD, hypertension, syncope, recurrent pneumonia, GERD, 

chronic renal insufficiency, arthritis, gout, chronic bilateral shoulder pain, 

chronic bilateral knee pain, vertigo, obesity, lymphedema, basal cell carcinoma

, breast cancer.  She was never a smoker.  Her PCP is Dr. Horta.





  ** Chest


Pain Score (Numeric/FACES): 2





- Related Data


Allergies/Adverse Reactions: 


 Allergies











Allergy/AdvReac Type Severity Reaction Status Date / Time


 


codeine Allergy  Rash Verified 12/17/19 10:25


 


iodine Allergy  Rash Verified 12/17/19 10:25


 


metoprolol [From Toprol XL] Allergy  swelling Verified 12/17/19 10:25





   of lips  


 


naproxen [From Naprelan] Allergy  Rash Verified 12/17/19 10:25


 


prednisone Allergy  Other Verified 12/17/19 10:25


 


propoxyphene AdvReac  urine Verified 12/17/19 10:25





   retention  











Home Medications: 


 Home Meds





Acetaminophen [Tylenol] 325 mg PO Q6HR PRN 12/23/18 [History]


Bumetanide [Bumex] 1 mg PO BID 12/23/18 [History]


Loratadine 10 mg PO DAILY PRN 12/23/18 [History]


Multivitamin [Multi-Vitamin Daily] 1 tab PO DAILY 12/23/18 [History]


Simvastatin 20 mg PO BEDTIME 12/23/18 [History]


allopurinoL [Zyloprim] 100 mg PO BID 12/23/18 [History]


traMADol [Ultram] 50 mg PO BID PRN 12/23/18 [History]


Benzonatate [Tessalon Perle] 100 mg PO TID PRN 12/24/18 [History]


Cetirizine HCl [All Day Allergy] 10 mg PO DAILY 12/24/18 [History]


Niacin 500 mg PO DAILY 12/24/18 [History]


Amiodarone [Cordarone] 200 mg PO DAILY #30 tab 12/27/18 [Rx]


Apixaban [Eliquis] 2.5 mg PO BID #30 12/27/18 [Rx]


Ascorbic Acid 500 mg PO BID #30 12/27/18 [Rx]


Ferrous Sulfate [Iron] 325 mg PO BID #60 tablet 12/27/18 [Rx]


Acetaminophen [Tylenol] 650 mg PO BID 04/15/19 [History]


Famotidine [Pepcid] 20 mg PO DAILY 04/15/19 [History]


Sennosides/Docusate Sodium [Senna Plus Tablet] 1 tab PO BID PRN 04/15/19 [

History]


Vitamin E 400 unit PO DAILY 04/15/19 [History]


oxyCODONE 5 mg PO Q4H PRN #40 tab 11/05/19 [Rx]


Polyethylene Glycol 3350 [MiraLAX] 17 gm PO BID 30 Days  packet 11/06/19 [Rx]











Past Medical History


HEENT History: Reports: Hard of Hearing, Impaired Vision, Other (See Below)


Other HEENT History: hearing aid to left ear, wears glasses for reading


Cardiovascular History: Reports: Afib, High Cholesterol, Hypertension, Syncope


Respiratory History: Reports: Pneumonia, Recurrent


Gastrointestinal History: Reports: GERD


Genitourinary History: Reports: Chronic Renal Insuffiency


OB/GYN History: Reports: Pregnancy


Musculoskeletal History: Reports: Arthritis, Gout, Other (See Below)


Other Musculoskeletal History: Chronic bilateral shoulder pain, chronic 

bilateral knee pain


Neurological History: Reports: None, Vertigo


Endocrine/Metabolic History: Reports: Obesity/BMI 30+


Other Endocrine/Metabolic History: gout


Hematologic History: Reports: Other (See Below)


Other Hematologic History: lymphodema


Immunologic History: Reports: None


Oncologic (Cancer) History: Reports: Basal Cell Carcinoma, Breast


Other Oncologic History: potential breast cancer, recent diagnossis.  breast 

cancer and is to start radiation


Dermatologic History: Reports: Other (See Below)


Other Dermatologic History: cellulitis to leg right





- Infectious Disease History


Infectious Disease History: Reports: Chicken Pox, Measles, Shingles





- Past Surgical History


HEENT Surgical History: Reports: Cataract Surgery


Cardiovascular Surgical History: Reports: None


Respiratory Surgical History: Reports: None


GI Surgical History: Reports: None


Female  Surgical History: Reports: Breast Biopsy


Other Female  Surgeries/Procedures: right breast tissue removed for cancer 

purposes


Endocrine Surgical History: Reports: None


Neurological Surgical History: Reports: None


Musculoskeletal Surgical History: Reports: Other (See Below)


Other Musculoskeletal Surgeries/Procedures:: cortisone injections to knees


Other Oncologic Surgeries/Procedures: titanium tag in breast for marking





Social & Family History





- Family History


Family Medical History: Noncontributory





- Tobacco Use


Smoking Status *Q: Never Smoker





- Caffeine Use


Caffeine Use: Reports: Tea





- Living Situation & Occupation


Living situation: Reports: , Extended Care Facility


Occupation: Retired





H&P Review of Systems





- Review of Systems:


Review Of Systems: See Below


General: Reports: Fever, Chills, Malaise, Weakness, Fatigue, Decreased Appetite


HEENT: Reports: Glasses.  Denies: Headaches, Sore Throat


Pulmonary: Reports: Shortness of Breath, Wheezing, Pleuritic Chest Pain, Cough, 

Sputum


Cardiovascular: Reports: No Symptoms, Dyspnea on Exertion, Edema, Syncope (

history of), Blood Pressure Problem.  Denies: Chest Pain, Palpitations


Gastrointestinal: Reports: Decreased Appetite, Nausea.  Denies: Abdominal Pain, 

Constipation, Diarrhea, Vomiting


Genitourinary: Reports: Incontinence (chronic urge and stress ).  Denies: Pain


Musculoskeletal: Reports: Neck Pain (chronic ), Shoulder Pain (chronic ), Back 

Pain (chronic lower), Joint Pain (chronic knee and hip)


Skin: Reports: Bruising (scattered bruising to face, forehead, bilateral upper 

and lower extremities secondary to a fall on November 2nd. ), Wound (Large skin 

tear to right forearm).  Denies: Cyanosis


Psychiatric: Reports: No Symptoms.  Denies: Confusion


Neurological: Reports: Difficulty Walking, Weakness.  Denies: Dizziness, 

Headache, Pre-Existing Deficit, Trouble Speaking


Hematologic/Lymphatic: Reports: No Symptoms


Immunologic: Reports: No Symptoms





Exam





- Exam


Exam: See Below





- Vital Signs


Vital Signs: 


 Last Vital Signs











Temp  98.3 F   12/17/19 10:21


 


Pulse  86   12/17/19 10:21


 


Resp  16   12/17/19 10:21


 


BP  134/87   12/17/19 10:21


 


Pulse Ox  95   12/17/19 11:06











Weight: 140 lb





- Exam


Quality Assessment: Supplemental Oxygen (2L), DVT Prophylaxis


General: Alert, Oriented, Cooperative.  No: Mild Distress


HEENT: Conjunctiva Clear, EACs Clear, EOMI, Hearing Intact, Posterior Pharynx 

Clear, Pupils Equal, Pupils Reactive.  No: Mucosa Moist & Pink (dry)


Neck: Supple, Trachea Midline.  No: Full Range of Motion


Lungs: Normal Respiratory Effort, Decreased Breath Sounds, Rhonchi, Wheezing


Cardiovascular: Regular Rate, Regular Rhythm


GI/Abdominal Exam: Normal Bowel Sounds, Soft, Non-Tender, No Distention, No 

Abnormal Bruit


 (Female) Exam: Deferred


Rectal (Female) Exam: Deferred


Back Exam: Full Range of Motion, Other (Milk kyphosis noted )


Extremities: Normal Inspection, Non-Tender, Pedal Edema (3-4+ Wears velcro 

compression bandages bilaterally to help with swelling ), Other (Scattered 

upper and lower extremity bruising ).  No: Leg Pain, Increased Warmth, Redness


Neurological: Cranial Nerves Intact (grossly )


Neuro Extensive - Mental Status: Alert, Oriented x3, Normal Mood/Affect





- Patient Data


Lab Results Last 24 hrs: 


 Laboratory Results - last 24 hr











  12/17/19 12/17/19 12/17/19 Range/Units





  11:23 11:23 11:23 


 


WBC  11.67 H    (3.98-10.04)  K/mm3


 


RBC  3.67 L    (3.98-5.22)  M/mm3


 


Hgb  11.6  D    (11.2-15.7)  gm/dl


 


Hct  36.2    (34.1-44.9)  %


 


MCV  98.6 H    (79.4-94.8)  fl


 


MCH  31.6    (25.6-32.2)  pg


 


MCHC  32.0 L    (32.2-35.5)  g/dl


 


RDW Std Deviation  51.4 H    (36.4-46.3)  fL


 


Plt Count  259    (182-369)  K/mm3


 


MPV  11.2    (9.4-12.3)  fl


 


Neutrophils % (Manual)  88 H    (40-60)  %


 


Band Neutrophils %  0    (0-10)  %


 


Lymphocytes % (Manual)  10 L    (20-40)  %


 


Atypical Lymphs %  0    %


 


Monocytes % (Manual)  2    (2-10)  %


 


Eosinophils % (Manual)  0 L    (0.7-5.8)  %


 


Basophils % (Manual)  0 L    (0.1-1.2)  


 


Platelet Estimate  Adequate    


 


Anisocytosis  1+ slight    


 


RBC Morph Comment  Not Reportable    


 


ESR     (0-20)  mm/hr


 


PT   10.8   (9.7-12.0)  SECONDS


 


INR   0.99   


 


APTT   27  D   (22-31)  SECONDS


 


Sodium    137  (136-145)  mEq/L


 


Potassium    4.4  (3.5-5.1)  mEq/L


 


Chloride    98  ()  mEq/L


 


Carbon Dioxide    28  (21-32)  mEq/L


 


Anion Gap    15.4 H  (5-15)  


 


BUN    73 H  (7-18)  mg/dL


 


Creatinine    1.7 H  (0.55-1.02)  mg/dL


 


Est Cr Clr Drug Dosing    TNP  


 


Estimated GFR (MDRD)    29  (>60)  mL/min


 


BUN/Creatinine Ratio    42.9 H  (14-18)  


 


Glucose    113  ()  mg/dL


 


POC Glucose     ()  mg/dL


 


Lactic Acid     (0.4-2.0)  mmol/L


 


Calcium    9.3  (8.5-10.1)  mg/dL


 


Magnesium    1.7 L  (1.8-2.4)  mg/dl


 


Total Bilirubin    0.5  (0.2-1.0)  mg/dL


 


AST    96 H  (15-37)  U/L


 


ALT    133 H  (14-59)  U/L


 


Alkaline Phosphatase    108  ()  U/L


 


CK-MB (CK-2)    0.6  (0-3.6)  ng/ml


 


Troponin I    < 0.017  (0.00-0.056)  ng/mL


 


C-Reactive Protein    3.9 H*  (<1.0)  mg/dL


 


NT-Pro-B Natriuret Pep     (0-450)  pg/mL


 


Total Protein    6.3 L  (6.4-8.2)  g/dl


 


Albumin    3.2 L  (3.4-5.0)  g/dl


 


Globulin    3.1  gm/dL


 


Albumin/Globulin Ratio    1.0  (1-2)  














  12/17/19 12/17/19 12/17/19 Range/Units





  11:23 11:23 11:23 


 


WBC     (3.98-10.04)  K/mm3


 


RBC     (3.98-5.22)  M/mm3


 


Hgb     (11.2-15.7)  gm/dl


 


Hct     (34.1-44.9)  %


 


MCV     (79.4-94.8)  fl


 


MCH     (25.6-32.2)  pg


 


MCHC     (32.2-35.5)  g/dl


 


RDW Std Deviation     (36.4-46.3)  fL


 


Plt Count     (182-369)  K/mm3


 


MPV     (9.4-12.3)  fl


 


Neutrophils % (Manual)     (40-60)  %


 


Band Neutrophils %     (0-10)  %


 


Lymphocytes % (Manual)     (20-40)  %


 


Atypical Lymphs %     %


 


Monocytes % (Manual)     (2-10)  %


 


Eosinophils % (Manual)     (0.7-5.8)  %


 


Basophils % (Manual)     (0.1-1.2)  


 


Platelet Estimate     


 


Anisocytosis     


 


RBC Morph Comment     


 


ESR  54 H    (0-20)  mm/hr


 


PT     (9.7-12.0)  SECONDS


 


INR     


 


APTT     (22-31)  SECONDS


 


Sodium     (136-145)  mEq/L


 


Potassium     (3.5-5.1)  mEq/L


 


Chloride     ()  mEq/L


 


Carbon Dioxide     (21-32)  mEq/L


 


Anion Gap     (5-15)  


 


BUN     (7-18)  mg/dL


 


Creatinine     (0.55-1.02)  mg/dL


 


Est Cr Clr Drug Dosing     


 


Estimated GFR (MDRD)     (>60)  mL/min


 


BUN/Creatinine Ratio     (14-18)  


 


Glucose     ()  mg/dL


 


POC Glucose     ()  mg/dL


 


Lactic Acid    1.2  (0.4-2.0)  mmol/L


 


Calcium     (8.5-10.1)  mg/dL


 


Magnesium     (1.8-2.4)  mg/dl


 


Total Bilirubin     (0.2-1.0)  mg/dL


 


AST     (15-37)  U/L


 


ALT     (14-59)  U/L


 


Alkaline Phosphatase     ()  U/L


 


CK-MB (CK-2)     (0-3.6)  ng/ml


 


Troponin I     (0.00-0.056)  ng/mL


 


C-Reactive Protein     (<1.0)  mg/dL


 


NT-Pro-B Natriuret Pep   486 H   (0-450)  pg/mL


 


Total Protein     (6.4-8.2)  g/dl


 


Albumin     (3.4-5.0)  g/dl


 


Globulin     gm/dL


 


Albumin/Globulin Ratio     (1-2)  














  12/17/19 Range/Units





  11:55 


 


WBC   (3.98-10.04)  K/mm3


 


RBC   (3.98-5.22)  M/mm3


 


Hgb   (11.2-15.7)  gm/dl


 


Hct   (34.1-44.9)  %


 


MCV   (79.4-94.8)  fl


 


MCH   (25.6-32.2)  pg


 


MCHC   (32.2-35.5)  g/dl


 


RDW Std Deviation   (36.4-46.3)  fL


 


Plt Count   (182-369)  K/mm3


 


MPV   (9.4-12.3)  fl


 


Neutrophils % (Manual)   (40-60)  %


 


Band Neutrophils %   (0-10)  %


 


Lymphocytes % (Manual)   (20-40)  %


 


Atypical Lymphs %   %


 


Monocytes % (Manual)   (2-10)  %


 


Eosinophils % (Manual)   (0.7-5.8)  %


 


Basophils % (Manual)   (0.1-1.2)  


 


Platelet Estimate   


 


Anisocytosis   


 


RBC Morph Comment   


 


ESR   (0-20)  mm/hr


 


PT   (9.7-12.0)  SECONDS


 


INR   


 


APTT   (22-31)  SECONDS


 


Sodium   (136-145)  mEq/L


 


Potassium   (3.5-5.1)  mEq/L


 


Chloride   ()  mEq/L


 


Carbon Dioxide   (21-32)  mEq/L


 


Anion Gap   (5-15)  


 


BUN   (7-18)  mg/dL


 


Creatinine   (0.55-1.02)  mg/dL


 


Est Cr Clr Drug Dosing   


 


Estimated GFR (MDRD)   (>60)  mL/min


 


BUN/Creatinine Ratio   (14-18)  


 


Glucose   ()  mg/dL


 


POC Glucose  133 H  ()  mg/dL


 


Lactic Acid   (0.4-2.0)  mmol/L


 


Calcium   (8.5-10.1)  mg/dL


 


Magnesium   (1.8-2.4)  mg/dl


 


Total Bilirubin   (0.2-1.0)  mg/dL


 


AST   (15-37)  U/L


 


ALT   (14-59)  U/L


 


Alkaline Phosphatase   ()  U/L


 


CK-MB (CK-2)   (0-3.6)  ng/ml


 


Troponin I   (0.00-0.056)  ng/mL


 


C-Reactive Protein   (<1.0)  mg/dL


 


NT-Pro-B Natriuret Pep   (0-450)  pg/mL


 


Total Protein   (6.4-8.2)  g/dl


 


Albumin   (3.4-5.0)  g/dl


 


Globulin   gm/dL


 


Albumin/Globulin Ratio   (1-2)  











Result Diagrams: 


 12/17/19 11:23





 12/17/19 11:23


[facility] Results Last 24 hrs: 


 Microbiology











 12/17/19 11:05 Influenza Type A Antigen Screen - Final





 Nasal Aspirate, Unspecified    NEGATIVE INFLUENZA A VIRUS AG





    REFERENCE RANGE: NEGATIVE





 Influenza Type B Antigen Screen - Final





    NEGATIVE INFLUENZA B VIRUS AG





    REFERENCE RANGE: NEGATIVE














Sepsis Event Note





- Evaluation


Sepsis Screening Result: No Definite Risk





- Focused Exam


Vital Signs: 


 Vital Signs











  Temp Pulse Resp BP Pulse Ox Pulse Ox


 


 12/17/19 11:06       95


 


 12/17/19 10:35       95


 


 12/17/19 10:21  98.3 F  86  16  134/87  92 L 











Date Exam was Performed: 12/17/19


Time Exam was Performed: 13:49





- Problem List


(1) Volume depletion


SNOMED Code(s): 12999005


   ICD Code: E86.9 - VOLUME DEPLETION, UNSPECIFIED   Status: Acute   Priority: 

High   Current Visit: Yes   





(2) Acute febrile illness


SNOMED Code(s): 647507830


   ICD Code: R50.9 - FEVER, UNSPECIFIED   Status: Acute   Priority: High   

Current Visit: Yes   





(3) Chronic renal insufficiency, stage IV (severe)


SNOMED Code(s): 675998233


   ICD Code: N18.4 - CHRONIC KIDNEY DISEASE, STAGE 4 (SEVERE)   Status: Chronic

   Priority: Medium   Current Visit: Yes   





(4) Pneumonia


SNOMED Code(s): 788323130


   ICD Code: J18.9 - PNEUMONIA, UNSPECIFIED ORGANISM   Status: Acute   Priority

: High   Current Visit: Yes   


Qualifiers: 


   Pneumonia type: due to unspecified organism   Lung location: unspecified 

part of lung 





(5) Bilateral knee pain


SNOMED Code(s): 53757015


   ICD Code: M25.561 - PAIN IN RIGHT KNEE; M25.562 - PAIN IN LEFT KNEE   Status

: Chronic   Priority: Medium   Current Visit: No   


Qualifiers: 


   Chronicity: acute   Qualified Code(s): M25.561 - Pain in right knee; M25.562 

- Pain in left knee   





(6) Facial contusion


SNOMED Code(s): 394113979


   ICD Code: S00.83XA - CONTUSION OF OTHER PART OF HEAD, INITIAL ENCOUNTER   

Status: Chronic   Priority: Medium   Current Visit: No   


Qualifiers: 


   Encounter type: initial encounter   Qualified Code(s): S00.83XA - Contusion 

of other part of head, initial encounter   





(7) Laceration of left forearm


SNOMED Code(s): 36006350103530581


   ICD Code: S51.812A - LACERATION WITHOUT FOREIGN BODY OF LEFT FOREARM, INIT 

ENCNTR   Status: Chronic   Priority: Medium   Current Visit: No   


Qualifiers: 


   Encounter type: subsequent encounter   Qualified Code(s): S51.812D - 

Laceration without foreign body of left forearm, subsequent encounter   





(8) Paroxysmal atrial fibrillation


SNOMED Code(s): 763170515


   ICD Code: I48.0 - PAROXYSMAL ATRIAL FIBRILLATION   Status: Chronic   Priority

: Low   Current Visit: No   





(9) Peripheral edema


SNOMED Code(s): 973545812


   ICD Code: R60.9 - EDEMA, UNSPECIFIED   Status: Chronic   Priority: Medium   

Current Visit: No   





(10) Hypomagnesemia


SNOMED Code(s): 102576080


   ICD Code: E83.42 - HYPOMAGNESEMIA   Status: Acute   Priority: High   Current 

Visit: Yes   


Problem List Initiated/Reviewed/Updated: Yes


Orders Last 24hrs: 


 Active Orders 24 hr











 Category Date Time Status


 


 Blood Glucose Check, Bedside [RC] ONETIME Care  12/17/19 10:35 Active


 


 EKG Documentation Completion [RC] STAT Care  12/17/19 10:35 Active


 


 Oxygen Therapy [RC] ASDIRECTED Care  12/17/19 10:35 Active


 


 RT Aerosol Therapy [RC] ASDIRECTED Care  12/17/19 10:47 Active


 


 CULTURE BLOOD [BC] Stat Lab  12/17/19 08:07 Received


 


 CULTURE BLOOD [BC] Stat Lab  12/17/19 11:23 Received


 


 CULTURE SPUTUM + SMEAR [RM] Stat Lab  12/17/19 10:46 Ordered


 


 Dextrose 5%-0.9% NaCl [Dextrose 5%-Normal Saline] 1,000 Med  12/17/19 10:45 

Active





 ml   





 IV ASDIRECTED   


 


 Sodium Chloride 0.9% [Normal Saline] 500 ml Med  12/17/19 12:35 Active





 IV .BOLUS   


 


 cefTRIAXone [Rocephin] 2 gm Med  12/17/19 11:15 Active





 Sodium Chloride 0.9% [Normal Saline] 100 ml   





 IV Q24H   


 


 Blood Culture x2 Reflex Set [OM.PC] Stat Oth  12/17/19 10:35 Ordered








 Medication Orders





Dextrose/Sodium Chloride (Dextrose 5%-Normal Saline)  1,000 mls @ 100 mls/hr IV 

ASDIRECTED CHON


   Last Admin: 12/17/19 11:24  Dose: 100 mls/hr


Ceftriaxone Sodium 2 gm/ (Sodium Chloride)  100 mls @ 200 mls/hr IV Q24H CHON


   Last Admin: 12/17/19 11:38  Dose: 200 mls/hr


Sodium Chloride (Normal Saline)  500 mls @ 250 mls/hr IV .BOLUS ONE


   Stop: 12/17/19 14:34


   Last Admin: 12/17/19 13:23  Dose: 250 mls/hr








Assessment/Plan Comment:: 


I/P:





Acute:





Pneumonia


   -Resides at [facility]


   -Reports symptoms for past few weeks, SNF staff reports symptoms for past 48 

hrs


   -Congested productive cough


   -Hx/o recurrent PNA


   -No fever in ED


   -WBC 11.67


   -CRP 3.9


   -Lactic acid 1.2


   -Started on Rocephin in ED - continue


   -500mL fluid bolus given in ED


   -Tylenol for fever


   -Blood cultures pending


   -IV fluids as ordered


   -Mycoplasma, Strep pneumonia, viral panel - all pending


   -Negative influenza


   -IS/Acapella/RT


   -Duonebs as ordered


   -Droplet isolation


   -Sputum culture if able to produce


   -Consider Repeat CXR in 24-48 hrs if indicated 


   


Volume depletion


   -Clinically dry


   -Anion gap 15.4


   -Reports poor oral intake last 24-48 hours


   -IV fluids as ordered





Chronic kidney failure, Stage IV


   -BUN 73


   -Creatinine 1.7


   -GFR of 29


   -Baseline creatinine appears to be 1.7-2.2; GFR of 24-31


   -Stable


   -Avoid nephrotoxic agents if possible


   -IV fluids as above





Hypomagnesemia


   -Magnesium 1.7


   -Supplemented





Pedal edema


   -Chronic bilateral pedal edema


   -Hx/o lymphedema


   -Home Velcro compression stockings


   -Elevation  


   -Continue home Bumex


   -Caution with IV hydration





Chronic pain


   -Reports chronic bilateral shoulder, bilateral knee, bilateral hip, lower 

back, and neck pain


   -Home pain medications as ordered


   -PT/OT


   -Hx/o gout - allopurinol 


   -ESR 54, CRP 3.9





Chronic:


A. fib on eliquis 


HLD


hypertension


syncope


recurrent pneumonia


GERD


arthritis


vertigo


obesity


lymphedema


basal cell carcinoma


breast cancer





Plan:


Admit to medical floor on telemetry 


Home medications as ordered


Routine AM labs


CM/SW for discharge planning: resident of [facility]


Other orders as indicated above


DVT Prophylaxis: Home Eliquis, Home compression stockings; GI Prophylaxis: Not 

indicated


Code status: DNR; PCP: Dr. Horta








- Mortality Measure


Prognosis:: Good

## 2019-12-17 NOTE — EDM.PDOC
ED HPI GENERAL MEDICAL PROBLEM





- General


Chief Complaint: Respiratory Problem


Stated Complaint: short of breath


Time Seen by Provider: 12/17/19 10:20


Source of Information: Reports: Patient, EMS


History Limitations: Reports: No Limitations





- History of Present Illness


INITIAL COMMENTS - FREE TEXT/NARRATIVE: 





84-year-old female presents the ED from one of the local nursing homes where 

she resides. She presents with a very productive sounding bilateral cough. She 

is febrile on exam. She apparently became ill within the last 48 hours. She has 

been given Tylenol for fever relief particular murmur when she received it 

last. She did have a flu shot this year. She did not eat at all today and she 

lost her appetite but did eat fairly well yesterday. He diarrhea. Denies any 

vomiting. His multiple bruises of her face and right upper extremity and both 

knees from a fall apparently November 2. This resulted in a very large skin 

tear of the entire right extensor surface of her forearm.


Onset: Gradual


Onset Date: 12/15/19


Duration: Day(s):, Getting Worse


Location: Reports: Chest (Very congested productive sounding cough with 

associated fever.)


Quality: Reports: Other


Severity: Severe (Productive cough.)


Improves with: Reports: None


Worsens with: Reports: Other


Context: Denies: Activity (Wind down or with trying to walk.), Exercise, Lifting

, Sick Contact, Trauma, Other


Associated Symptoms: Reports: Cough, cough w sputum, Fever/Chills, Loss of 

Appetite, Malaise, Shortness of Breath, Weakness.  Denies: Confusion, Chest Pain

, Nausea/Vomiting, Rash, Seizure, Syncope


Treatments PTA: Reports: Acetaminophen (At the nursing home but she's not sure 

when she received her last dose.)


  ** Chest


Pain Score (Numeric/FACES): 2





- Related Data


 Allergies











Allergy/AdvReac Type Severity Reaction Status Date / Time


 


codeine Allergy  Rash Verified 12/17/19 10:25


 


iodine Allergy  Rash Verified 12/17/19 10:25


 


metoprolol [From Toprol XL] Allergy  swelling Verified 12/17/19 10:25





   of lips  


 


naproxen [From Naprelan] Allergy  Rash Verified 12/17/19 10:25


 


prednisone Allergy  Other Verified 12/17/19 10:25


 


propoxyphene AdvReac  urine Verified 12/17/19 10:25





   retention  











Home Meds: 


 Home Meds





Acetaminophen [Tylenol] 325 mg PO Q6HR PRN 12/23/18 [History]


Bumetanide [Bumex] 1 mg PO BID 12/23/18 [History]


Loratadine 10 mg PO DAILY PRN 12/23/18 [History]


Multivitamin [Multi-Vitamin Daily] 1 tab PO DAILY 12/23/18 [History]


Simvastatin 20 mg PO BEDTIME 12/23/18 [History]


allopurinoL [Zyloprim] 100 mg PO BID 12/23/18 [History]


traMADol [Ultram] 50 mg PO BID PRN 12/23/18 [History]


Benzonatate [Tessalon Perle] 100 mg PO TID PRN 12/24/18 [History]


Cetirizine HCl [All Day Allergy] 10 mg PO DAILY 12/24/18 [History]


Niacin 500 mg PO DAILY 12/24/18 [History]


Amiodarone [Cordarone] 200 mg PO DAILY #30 tab 12/27/18 [Rx]


Apixaban [Eliquis] 2.5 mg PO BID #30 12/27/18 [Rx]


Ascorbic Acid 500 mg PO BID #30 12/27/18 [Rx]


Ferrous Sulfate [Iron] 325 mg PO BID #60 tablet 12/27/18 [Rx]


Acetaminophen [Tylenol] 650 mg PO BID 04/15/19 [History]


Famotidine [Pepcid] 20 mg PO DAILY 04/15/19 [History]


Sennosides/Docusate Sodium [Senna Plus Tablet] 1 tab PO BID PRN 04/15/19 [

History]


Vitamin E 400 unit PO DAILY 04/15/19 [History]


oxyCODONE 5 mg PO Q4H PRN #40 tab 11/05/19 [Rx]


Polyethylene Glycol 3350 [MiraLAX] 17 gm PO BID 30 Days  packet 11/06/19 [Rx]











Past Medical History


HEENT History: Reports: Hard of Hearing, Impaired Vision, Other (See Below)


Other HEENT History: hearing aid to left ear, wears glasses for reading


Cardiovascular History: Reports: Afib, High Cholesterol, Hypertension, Syncope


Respiratory History: Reports: Pneumonia, Recurrent


Gastrointestinal History: Reports: GERD


Genitourinary History: Reports: Chronic Renal Insuffiency


OB/GYN History: Reports: Pregnancy


Musculoskeletal History: Reports: Arthritis, Gout, Other (See Below)


Other Musculoskeletal History: Chronic bilateral shoulder pain, chronic 

bilateral knee pain


Neurological History: Reports: None, Vertigo


Endocrine/Metabolic History: Reports: Obesity/BMI 30+


Other Endocrine/Metabolic History: gout


Hematologic History: Reports: Other (See Below)


Other Hematologic History: lymphodema


Immunologic History: Reports: None


Oncologic (Cancer) History: Reports: Basal Cell Carcinoma, Breast


Other Oncologic History: potential breast cancer, recent diagnossis.  breast 

cancer and is to start radiation


Dermatologic History: Reports: Other (See Below)


Other Dermatologic History: cellulitis to leg right





- Infectious Disease History


Infectious Disease History: Reports: Chicken Pox, Measles, Shingles





- Past Surgical History


HEENT Surgical History: Reports: Cataract Surgery


Cardiovascular Surgical History: Reports: None


Respiratory Surgical History: Reports: None


GI Surgical History: Reports: None


Female  Surgical History: Reports: Breast Biopsy


Other Female  Surgeries/Procedures: right breast tissue removed for cancer 

purposes


Endocrine Surgical History: Reports: None


Neurological Surgical History: Reports: None


Musculoskeletal Surgical History: Reports: Other (See Below)


Other Musculoskeletal Surgeries/Procedures:: cortisone injections to knees


Other Oncologic Surgeries/Procedures: titanium tag in breast for marking





Social & Family History





- Family History


Family Medical History: Noncontributory





- Tobacco Use


Smoking Status *Q: Never Smoker





- Caffeine Use


Caffeine Use: Reports: Tea





- Living Situation & Occupation


Living situation: Reports: , Extended Care Facility


Occupation: Retired





ED ROS GENERAL





- Review of Systems


Review Of Systems: See Below


Constitutional: Reports: Fever, Chills, Malaise, Weakness, Fatigue, Decreased 

Appetite


HEENT: Reports: Glasses, Hearing Loss, Vision Change


Respiratory: Reports: Shortness of Breath (Poor vision.), Wheezing, Cough, 

Sputum.  Denies: Pleuritic Chest Pain, Hemoptysis


Cardiovascular: Reports: Blood Pressure Problem, Dyspnea on Exertion (Chronic 

both lower extremities), Edema, Orthopnea, Palpitations.  Denies: Chest Pain, 

Claudication


Endocrine: Reports: Fatigue


GI/Abdominal: Reports: Constipation, Decreased Appetite (DD little yesterday 

but none today.), Nausea.  Denies: Stool Incontinence, Vomiting


: Reports: Incontinence (Both urge and stress components.)


Musculoskeletal: Reports: Neck Pain, Shoulder Pain, Back Pain, Joint Pain (

Knees hips and lower back.)


Skin: Reports: Other (Healing contusions to her face with bruising over both 

malar eminences and forehead. Large skin tear right forearm which is slowly 

granulating in. She has contusions and ecchymoses to both patellas worse on the 

left as compared to the right. This is all from a fall on November 2. She is on 

Eliquis.)


Neurological: Reports: Difficulty Walking, Weakness.  Denies: Confusion, 

Dizziness, Headache, Numbness, Syncope, Tingling, Tremors, Trouble Speaking (

Able to walk on her own), Change in Speech


Psychiatric: Reports: No Symptoms


Hematologic/Lymphatic: Reports: No Symptoms


Immunologic: Reports: No Symptoms





ED EXAM, GENERAL





- Physical Exam


Exam: See Below


Exam Limited By: No Limitations


General Appearance: Alert, WD/WN, Moderate Distress (frequent productive 

sounding cough.), Other (Temperature is 36.8 and this is not correct. She is 

much warmer than this. Heart rate is 86 respiratory 16 BP 1 3487 O2 sats 92% on 

room air.)


Eye Exam: Bilateral Eye: Normal Inspection


Throat/Mouth: Normal Inspection, Normal Lips, Normal Oropharynx, Other (No 

signs of oropharyngeal inflammation.)


Head: Atraumatic, Normocephalic


Neck: Limited Range of Motion, Tender Lateral.  No: Carotid Bruit, 

Lymphadenopathy (L), Lymphadenopathy (R) (Tender bowel bilateral aspects of the 

cervical spine to compare with degenerative arthritic change.)


Respiratory/Chest: No Accessory Muscle Use, Respiratory Distress (Mild 

tachypnea.), Decreased Breath Sounds (Decreased breath sounds to the lower 25% 

of lung fields posteriorly.), Rhonchi (Rhonchi throughout all lung fields with 

occasional expiratory wheeze.).  No: Lungs Clear, Normal Breath Sounds, Chest 

Non-Tender


Cardiovascular: Regular Rate, Rhythm, No Edema, No Gallop, No Murmur, No Rub, 

Other (Note heart sounds are difficult to hear due to adventitious sounds in 

the lungs.).  No: Normal Peripheral Pulses


Peripheral Pulses: 0: Posterior Tibial (L) (Pulses in the feet are obscured by 

severe dependent edema both lower extremities), Posterior Tibial (R), Dorsalis 

Pedis (L), Dorsalis Pedis (R), 2+: Carotid (L), Carotid (R)


GI/Abdominal: Normal Bowel Sounds, Soft, Non-Tender, No Organomegaly, No 

Abnormal Bruit, No Mass, Pelvis Stable, Other.  No: Distended


Back Exam: Full Range of Motion, Other (Mild kyphosis thoracic spine).  No: CVA 

Tenderness (L), CVA Tenderness (R)


Extremities: Normal Inspection, Normal Range of Motion, Non-Tender, Pedal Edema 

(Plus pitting edema both lower extremities. She wears compression type Velcro 

bandages on both lower extremities to help with severe edema. No open ulcers 

appreciated on the lower extremities.)


Neurological: Alert, Oriented, CN II-XII Intact, Normal Cognition.  No: Normal 

Gait


Psychiatric: Flat Affect


Skin Exam: Warm, Dry, Intact, Other (Very warm to palpation due to fever. 

Healing very large skin tear right extensor surface of forearm that occurred 

November 2. Ecchymoses both sides of her face over the malar eminences worse on 

the left side as compared to the right. Similar contusions to both anterior 

knees worse on the left as compared to the right.)





EKG INTERPRETATION


EKG Date: 12/17/19


Time: 10:47


Rhythm: Other (Ectopic atrial rhythm with regular rhythm at 78/m)


Rate (Beats/Min): 78


Axis: LAD-Left Axis Deviation


P-Wave: Present (Ectopic atrial beats as the P-wave is inverted.)


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 

infarction.)


ST-T: Other (Nonspecific T-wave flattening primarily in leads aVL.)


QT: Prolonged (Mildly prolonged.)


EKG Interpretation Comments: 





Abnormal ECG





Course





- Vital Signs


Last Recorded V/S: 


 Last Vital Signs











Temp  36.8 C   12/17/19 10:21


 


Pulse  86   12/17/19 10:21


 


Resp  16   12/17/19 10:21


 


BP  134/87   12/17/19 10:21


 


Pulse Ox  95   12/17/19 11:06














- Orders/Labs/Meds


Orders: 


 Active Orders 24 hr











 Category Date Time Status


 


 Blood Glucose Check, Bedside [] ONETIME Care  12/17/19 10:35 Active


 


 EKG Documentation Completion [RC] STAT Care  12/17/19 10:35 Active


 


 Oxygen Therapy [RC] ASDIRECTED Care  12/17/19 10:35 Active


 


 RT Aerosol Therapy [RC] ASDIRECTED Care  12/17/19 10:47 Active


 


 CULTURE BLOOD [BC] Stat Lab  12/17/19 08:07 Received


 


 CULTURE BLOOD [BC] Stat Lab  12/17/19 11:23 Received


 


 CULTURE SPUTUM + SMEAR [RM] Stat Lab  12/17/19 10:46 Ordered


 


 Dextrose 5%-0.9% NaCl [Dextrose 5%-Normal Saline] 1,000 Med  12/17/19 10:45 

Active





 ml   





 IV ASDIRECTED   


 


 Sodium Chloride 0.9% [Normal Saline] 500 ml Med  12/17/19 12:35 Active





 IV .BOLUS   


 


 cefTRIAXone [Rocephin] 2 gm Med  12/17/19 11:15 Active





 Sodium Chloride 0.9% [Normal Saline] 100 ml   





 IV Q24H   


 


 Blood Culture x2 Reflex Set [OM.PC] Stat Oth  12/17/19 10:35 Ordered








 Medication Orders





Dextrose/Sodium Chloride (Dextrose 5%-Normal Saline)  1,000 mls @ 100 mls/hr IV 

ASDIRECTED CHON


   Last Admin: 12/17/19 11:24  Dose: 100 mls/hr


Ceftriaxone Sodium 2 gm/ (Sodium Chloride)  100 mls @ 200 mls/hr IV Q24H CHON


   Last Admin: 12/17/19 11:38  Dose: 200 mls/hr


Sodium Chloride (Normal Saline)  500 mls @ 250 mls/hr IV .BOLUS ONE


   Stop: 12/17/19 14:34


   Last Admin: 12/17/19 13:23  Dose: 250 mls/hr








Labs: 


 Laboratory Tests











  12/17/19 12/17/19 12/17/19 Range/Units





  11:23 11:23 11:23 


 


WBC  11.67 H    (3.98-10.04)  K/mm3


 


RBC  3.67 L    (3.98-5.22)  M/mm3


 


Hgb  11.6  D    (11.2-15.7)  gm/dl


 


Hct  36.2    (34.1-44.9)  %


 


MCV  98.6 H    (79.4-94.8)  fl


 


MCH  31.6    (25.6-32.2)  pg


 


MCHC  32.0 L    (32.2-35.5)  g/dl


 


RDW Std Deviation  51.4 H    (36.4-46.3)  fL


 


Plt Count  259    (182-369)  K/mm3


 


MPV  11.2    (9.4-12.3)  fl


 


Neutrophils % (Manual)  88 H    (40-60)  %


 


Band Neutrophils %  0    (0-10)  %


 


Lymphocytes % (Manual)  10 L    (20-40)  %


 


Atypical Lymphs %  0    %


 


Monocytes % (Manual)  2    (2-10)  %


 


Eosinophils % (Manual)  0 L    (0.7-5.8)  %


 


Basophils % (Manual)  0 L    (0.1-1.2)  


 


Platelet Estimate  Adequate    


 


Anisocytosis  1+ slight    


 


RBC Morph Comment  Not Reportable    


 


ESR     (0-20)  mm/hr


 


PT   10.8   (9.7-12.0)  SECONDS


 


INR   0.99   


 


APTT   27  D   (22-31)  SECONDS


 


Sodium    137  (136-145)  mEq/L


 


Potassium    4.4  (3.5-5.1)  mEq/L


 


Chloride    98  ()  mEq/L


 


Carbon Dioxide    28  (21-32)  mEq/L


 


Anion Gap    15.4 H  (5-15)  


 


BUN    73 H  (7-18)  mg/dL


 


Creatinine    1.7 H  (0.55-1.02)  mg/dL


 


Est Cr Clr Drug Dosing    TNP  


 


Estimated GFR (MDRD)    29  (>60)  mL/min


 


BUN/Creatinine Ratio    42.9 H  (14-18)  


 


Glucose    113  ()  mg/dL


 


POC Glucose     ()  mg/dL


 


Lactic Acid     (0.4-2.0)  mmol/L


 


Calcium    9.3  (8.5-10.1)  mg/dL


 


Magnesium    1.7 L  (1.8-2.4)  mg/dl


 


Total Bilirubin    0.5  (0.2-1.0)  mg/dL


 


AST    96 H  (15-37)  U/L


 


ALT    133 H  (14-59)  U/L


 


Alkaline Phosphatase    108  ()  U/L


 


CK-MB (CK-2)    0.6  (0-3.6)  ng/ml


 


Troponin I    < 0.017  (0.00-0.056)  ng/mL


 


C-Reactive Protein    3.9 H*  (<1.0)  mg/dL


 


NT-Pro-B Natriuret Pep     (0-450)  pg/mL


 


Total Protein    6.3 L  (6.4-8.2)  g/dl


 


Albumin    3.2 L  (3.4-5.0)  g/dl


 


Globulin    3.1  gm/dL


 


Albumin/Globulin Ratio    1.0  (1-2)  














  12/17/19 12/17/19 12/17/19 Range/Units





  11:23 11:23 11:23 


 


WBC     (3.98-10.04)  K/mm3


 


RBC     (3.98-5.22)  M/mm3


 


Hgb     (11.2-15.7)  gm/dl


 


Hct     (34.1-44.9)  %


 


MCV     (79.4-94.8)  fl


 


MCH     (25.6-32.2)  pg


 


MCHC     (32.2-35.5)  g/dl


 


RDW Std Deviation     (36.4-46.3)  fL


 


Plt Count     (182-369)  K/mm3


 


MPV     (9.4-12.3)  fl


 


Neutrophils % (Manual)     (40-60)  %


 


Band Neutrophils %     (0-10)  %


 


Lymphocytes % (Manual)     (20-40)  %


 


Atypical Lymphs %     %


 


Monocytes % (Manual)     (2-10)  %


 


Eosinophils % (Manual)     (0.7-5.8)  %


 


Basophils % (Manual)     (0.1-1.2)  


 


Platelet Estimate     


 


Anisocytosis     


 


RBC Morph Comment     


 


ESR  54 H    (0-20)  mm/hr


 


PT     (9.7-12.0)  SECONDS


 


INR     


 


APTT     (22-31)  SECONDS


 


Sodium     (136-145)  mEq/L


 


Potassium     (3.5-5.1)  mEq/L


 


Chloride     ()  mEq/L


 


Carbon Dioxide     (21-32)  mEq/L


 


Anion Gap     (5-15)  


 


BUN     (7-18)  mg/dL


 


Creatinine     (0.55-1.02)  mg/dL


 


Est Cr Clr Drug Dosing     


 


Estimated GFR (MDRD)     (>60)  mL/min


 


BUN/Creatinine Ratio     (14-18)  


 


Glucose     ()  mg/dL


 


POC Glucose     ()  mg/dL


 


Lactic Acid    1.2  (0.4-2.0)  mmol/L


 


Calcium     (8.5-10.1)  mg/dL


 


Magnesium     (1.8-2.4)  mg/dl


 


Total Bilirubin     (0.2-1.0)  mg/dL


 


AST     (15-37)  U/L


 


ALT     (14-59)  U/L


 


Alkaline Phosphatase     ()  U/L


 


CK-MB (CK-2)     (0-3.6)  ng/ml


 


Troponin I     (0.00-0.056)  ng/mL


 


C-Reactive Protein     (<1.0)  mg/dL


 


NT-Pro-B Natriuret Pep   486 H   (0-450)  pg/mL


 


Total Protein     (6.4-8.2)  g/dl


 


Albumin     (3.4-5.0)  g/dl


 


Globulin     gm/dL


 


Albumin/Globulin Ratio     (1-2)  














  12/17/19 Range/Units





  11:55 


 


WBC   (3.98-10.04)  K/mm3


 


RBC   (3.98-5.22)  M/mm3


 


Hgb   (11.2-15.7)  gm/dl


 


Hct   (34.1-44.9)  %


 


MCV   (79.4-94.8)  fl


 


MCH   (25.6-32.2)  pg


 


MCHC   (32.2-35.5)  g/dl


 


RDW Std Deviation   (36.4-46.3)  fL


 


Plt Count   (182-369)  K/mm3


 


MPV   (9.4-12.3)  fl


 


Neutrophils % (Manual)   (40-60)  %


 


Band Neutrophils %   (0-10)  %


 


Lymphocytes % (Manual)   (20-40)  %


 


Atypical Lymphs %   %


 


Monocytes % (Manual)   (2-10)  %


 


Eosinophils % (Manual)   (0.7-5.8)  %


 


Basophils % (Manual)   (0.1-1.2)  


 


Platelet Estimate   


 


Anisocytosis   


 


RBC Morph Comment   


 


ESR   (0-20)  mm/hr


 


PT   (9.7-12.0)  SECONDS


 


INR   


 


APTT   (22-31)  SECONDS


 


Sodium   (136-145)  mEq/L


 


Potassium   (3.5-5.1)  mEq/L


 


Chloride   ()  mEq/L


 


Carbon Dioxide   (21-32)  mEq/L


 


Anion Gap   (5-15)  


 


BUN   (7-18)  mg/dL


 


Creatinine   (0.55-1.02)  mg/dL


 


Est Cr Clr Drug Dosing   


 


Estimated GFR (MDRD)   (>60)  mL/min


 


BUN/Creatinine Ratio   (14-18)  


 


Glucose   ()  mg/dL


 


POC Glucose  133 H  ()  mg/dL


 


Lactic Acid   (0.4-2.0)  mmol/L


 


Calcium   (8.5-10.1)  mg/dL


 


Magnesium   (1.8-2.4)  mg/dl


 


Total Bilirubin   (0.2-1.0)  mg/dL


 


AST   (15-37)  U/L


 


ALT   (14-59)  U/L


 


Alkaline Phosphatase   ()  U/L


 


CK-MB (CK-2)   (0-3.6)  ng/ml


 


Troponin I   (0.00-0.056)  ng/mL


 


C-Reactive Protein   (<1.0)  mg/dL


 


NT-Pro-B Natriuret Pep   (0-450)  pg/mL


 


Total Protein   (6.4-8.2)  g/dl


 


Albumin   (3.4-5.0)  g/dl


 


Globulin   gm/dL


 


Albumin/Globulin Ratio   (1-2)  











Meds: 


Medications











Generic Name Dose Route Start Last Admin





  Trade Name Freq  PRN Reason Stop Dose Admin


 


Dextrose/Sodium Chloride  1,000 mls @ 100 mls/hr  12/17/19 10:45  12/17/19 11:24





  Dextrose 5%-Normal Saline  IV   100 mls/hr





  ASDIRECTED CHON   Administration





     





     





     





     


 


Ceftriaxone Sodium 2 gm/  100 mls @ 200 mls/hr  12/17/19 11:15  12/17/19 11:38





  Sodium Chloride  IV   200 mls/hr





  Q24H CHON   Administration





     





     





     





     


 


Sodium Chloride  500 mls @ 250 mls/hr  12/17/19 12:35  12/17/19 13:23





  Normal Saline  IV  12/17/19 14:34  250 mls/hr





  .BOLUS ONE   Administration





     





     





     





     














Discontinued Medications














Generic Name Dose Route Start Last Admin





  Trade Name Freq  PRN Reason Stop Dose Admin


 


Acetaminophen  650 mg  12/17/19 10:37  12/17/19 11:13





  Tylenol  PO  12/17/19 10:38  650 mg





  NOW ONE   Administration





     





     





     





     


 


Albuterol/Ipratropium  3 ml  12/17/19 10:47  12/17/19 11:06





  Duoneb 3.0-0.5 Mg/3 Ml  NEB  12/17/19 10:48  3 ml





  ONETIME ONE   Administration





     





     





     





     














- Radiology Interpretation


Free Text/Narrative:: 


84-year-old female presents to the ED with very high temperature and very 

productive sounding cough compatible with pneumonia. He did have a flu shot. O2 

sats are 92% on room air. She will be placed on oxygen at 2 L/m by nasal 

specks. Given a DuoNeb inhalational treatment to see if we can induce some 

sputum for culture. She'll have a septic workup completed. IV will be D5 normal 

saline at 100 mils per hour for now. He has severe dependent edema bilaterally 

and likely right-sided heart failure. He reports the Bumex that she's been 

receiving the last couple days has not seem to make her produce much more urine 

than normal. She will will receive Tylenol 650 mg orally for fever. One view 

chest x-ray to be obtained. Antibiotics tentatively will be started as soon as 

the blood cultures 2 have been obtained.








- Re-Assessments/Exams


Free Text/Narrative Re-Assessment/Exam: 





12/17/19 11:11 portable chest x-ray reveals mild cardiomegaly and diffuse 

vascular congestion. May be a early infiltrate in the right lower lobe 

compatible with developing pneumonia. We'll give her Rocephin 2 g IV as 

Levaquin is relatively contraindicated since the patient is already on 

amiodarone.





12/17/19 11:56 Labs reveal a mildly elevated white count at 11.67 with 88% 

neutrophils and no band cells reported. Hemoglobin slightly low 11.6 with 

hematocrit of 36.2. MCV slightly elevated 98.6. Platelet count 259,000. PT is 

10.8 with an INR of 0.99. PTT is 27. Influenza screen is negative.





12/17/19 12:34 Sedimentation rate is 54. Sodium 137 potassium of 4.4. Chloride 

98 with a bicarbonate of 28. Anion gap is 15.4. BUN is markedly elevated at 73 

with a creatinine of 1.7. GFR is 29. This is stage IV chronic kidney disease. 

Appears to be volume depleted. BUN/creatinine ratio is 42.9. Glucose 113 with a 

bedside glucose reported to be 133. Lactic acid is 1.2. Calcium is 9.3. 

Magnesium slightly low at 1.7. Bilirubin was 0.5 AST is 96 with an ALT of 133. 

Alk phosphatase is 108. CK-MB fraction is 0.6 with a troponin I of less than 

0.017. C-reactive protein mildly elevated at 3.9. BNP is 486. Total protein 6.3 

with an albumin fraction of 3.2. Based on the above labs she appears to be 

bonding depleted. I will give her 250 mils normal saline fluid bolus. My plan 

is to have her admitted to the hospital. I will try and contact the hospitalist 

in this regard.





12/17/19 13:27  Dr Bowser has attended the patient in the ED . She will be 

admitted to the hospital --med surgery on telemetry. 








Departure





- Departure


Time of Disposition: 13:30


Disposition: Admitted As Inpatient 66


Condition: Serious


Clinical Impression: 


 COPD exacerbation, Acute febrile illness, Chronic renal insufficiency, stage 

IV (severe)





Pneumonia


Qualifiers:


 Pneumonia type: due to unspecified organism Lung location: unspecified part of 

lung 








- Discharge Information


Referrals: 


Pravin Horta MD [Primary Care Provider] - 


Forms:  ED Department Discharge





Sepsis Event Note





- Evaluation


Sepsis Screening Result: No Definite Risk





- Focused Exam


Vital Signs: 


 Vital Signs











  Temp Pulse Resp BP Pulse Ox Pulse Ox


 


 12/17/19 11:06       95


 


 12/17/19 10:35       95


 


 12/17/19 10:21  36.8 C  86  16  134/87  92 L 











Date Exam was Performed: 12/17/19


Time Exam was Performed: 13:27





- My Orders


Last 24 Hours: 


My Active Orders





12/17/19 08:07


CULTURE BLOOD [BC] Stat 





12/17/19 10:35


Blood Glucose Check, Bedside [RC] ONETIME 


EKG Documentation Completion [RC] STAT 


Oxygen Therapy [RC] ASDIRECTED 


Blood Culture x2 Reflex Set [OM.PC] Stat 





12/17/19 10:45


Dextrose 5%-0.9% NaCl [Dextrose 5%-Normal Saline] 1,000 ml IV ASDIRECTED 





12/17/19 10:46


CULTURE SPUTUM + SMEAR [RM] Stat 





12/17/19 10:47


RT Aerosol Therapy [RC] ASDIRECTED 





12/17/19 11:15


cefTRIAXone [Rocephin] 2 gm   Sodium Chloride 0.9% [Normal Saline] 100 ml IV 

Q24H 





12/17/19 11:23


CULTURE BLOOD [BC] Stat 





12/17/19 12:35


Sodium Chloride 0.9% [Normal Saline] 500 ml IV .BOLUS 














- Assessment/Plan


Last 24 Hours: 


My Active Orders





12/17/19 08:07


CULTURE BLOOD [BC] Stat 





12/17/19 10:35


Blood Glucose Check, Bedside [RC] ONETIME 


EKG Documentation Completion [RC] STAT 


Oxygen Therapy [RC] ASDIRECTED 


Blood Culture x2 Reflex Set [OM.PC] Stat 





12/17/19 10:45


Dextrose 5%-0.9% NaCl [Dextrose 5%-Normal Saline] 1,000 ml IV ASDIRECTED 





12/17/19 10:46


CULTURE SPUTUM + SMEAR [RM] Stat 





12/17/19 10:47


RT Aerosol Therapy [RC] ASDIRECTED 





12/17/19 11:15


cefTRIAXone [Rocephin] 2 gm   Sodium Chloride 0.9% [Normal Saline] 100 ml IV 

Q24H 





12/17/19 11:23


CULTURE BLOOD [BC] Stat 





12/17/19 12:35


Sodium Chloride 0.9% [Normal Saline] 500 ml IV .BOLUS

## 2019-12-17 NOTE — CR
Chest: Portable view of the chest was obtained.

 

Comparison: Prior chest x-ray of 11/04/19.

 

Slight atelectasis is noted within both lung bases.  No acute 

parenchymal change is otherwise seen.  Heart size and mediastinum are 

normal.  Degenerative change is noted within both shoulders.  Old left

 upper rib fractures are noted.

 

Impression:

1.  Incidental findings as noted above.

2.  Nothing acute is appreciated on portable chest x-ray.

 

Diagnostic code #2

 

This report was dictated in [place] [employer] Time

## 2019-12-18 NOTE — PCM.PN
- General Info


Date of Service: 12/18/19


Admission Dx/Problem (Free Text): 


Pneumonia 





Subjective Update: 


In to see Kamla. She is off of oxygen and reports she feels much better. She 

reports SOB and a cough still but says it has improved. She has been utilizing 

her IS and acapella. No patient concerns. She states she slept ok. Discussed 

mycoplasma PNA and plan of care. Doxycycline started due to home medications 

and concerns over prolonged QTc. 





Functional Status: Reports: Pain Controlled, Tolerating Diet, Ambulating, 

Urinating, Incentive Spirometry.  Denies: New Symptoms





- Review of Systems


General: Reports: Weakness (improved ).  Denies: Fever, Fatigue, Malaise


HEENT: Reports: No Symptoms.  Denies: Headaches, Sore Throat


Pulmonary: Reports: Shortness of Breath (improved ), Cough (improved ).  Denies

: Pleuritic Chest Pain, Sputum, Wheezing


Cardiovascular: Reports: Dyspnea on Exertion (improved ).  Denies: Chest Pain, 

Palpitations


Gastrointestinal: Reports: No Symptoms.  Denies: Abdominal Pain, Constipation, 

Diarrhea, Nausea, Vomiting


Genitourinary: Reports: No Symptoms.  Denies: Pain


Musculoskeletal: Reports: Neck Pain (chronic ), Back Pain (chronic ), Joint 

Pain (Shoulders, Hips, knees - chronic )


Skin: Reports: No Symptoms.  Denies: Cyanosis


Neurological: Reports: No Symptoms.  Denies: Confusion, Difficulty Walking, 

Gait Disturbance





- Patient Data


Vitals - Most Recent: 


 Last Vital Signs











Temp  97.7 F   12/17/19 23:38


 


Pulse  78   12/18/19 04:51


 


Resp  20   12/18/19 04:51


 


BP  152/77 H  12/18/19 04:53


 


Pulse Ox  97   12/18/19 04:51











Weight - Most Recent: 138 lb 11.2 oz


I&O - Last 24 Hours: 


 Intake & Output











 12/17/19 12/17/19 12/18/19





 14:59 22:59 06:59


 


Intake Total  0 400


 


Balance  0 400











Lab Results Last 24 Hours: 


 Laboratory Results - last 24 hr











  12/17/19 12/17/19 12/17/19 Range/Units





  11:23 11:23 11:23 


 


WBC  11.67 H    (3.98-10.04)  K/mm3


 


RBC  3.67 L    (3.98-5.22)  M/mm3


 


Hgb  11.6  D    (11.2-15.7)  gm/dl


 


Hct  36.2    (34.1-44.9)  %


 


MCV  98.6 H    (79.4-94.8)  fl


 


MCH  31.6    (25.6-32.2)  pg


 


MCHC  32.0 L    (32.2-35.5)  g/dl


 


RDW Std Deviation  51.4 H    (36.4-46.3)  fL


 


Plt Count  259    (182-369)  K/mm3


 


MPV  11.2    (9.4-12.3)  fl


 


Neut % (Auto)     (34.0-71.1)  %


 


Lymph % (Auto)     (19.3-51.7)  %


 


Mono % (Auto)     (4.7-12.5)  %


 


Eos % (Auto)     (0.7-5.8)  


 


Baso % (Auto)     (0.1-1.2)  %


 


Neut # (Auto)     (1.56-6.13)  K/mm3


 


Lymph # (Auto)     (1.18-3.74)  K/mm3


 


Mono # (Auto)     (0.24-0.36)  K/mm3


 


Eos # (Auto)     (0.04-0.36)  K/mm3


 


Baso # (Auto)     (0.01-0.08)  K/mm3


 


Neutrophils % (Manual)  88 H    (40-60)  %


 


Band Neutrophils %  0    (0-10)  %


 


Lymphocytes % (Manual)  10 L    (20-40)  %


 


Atypical Lymphs %  0    %


 


Monocytes % (Manual)  2    (2-10)  %


 


Eosinophils % (Manual)  0 L    (0.7-5.8)  %


 


Basophils % (Manual)  0 L    (0.1-1.2)  


 


Platelet Estimate  Adequate    


 


Anisocytosis  1+ slight    


 


RBC Morph Comment  Not Reportable    


 


ESR     (0-20)  mm/hr


 


PT   10.8   (9.7-12.0)  SECONDS


 


INR   0.99   


 


APTT   27  D   (22-31)  SECONDS


 


Sodium    137  (136-145)  mEq/L


 


Potassium    4.4  (3.5-5.1)  mEq/L


 


Chloride    98  ()  mEq/L


 


Carbon Dioxide    28  (21-32)  mEq/L


 


Anion Gap    15.4 H  (5-15)  


 


BUN    73 H  (7-18)  mg/dL


 


Creatinine    1.7 H  (0.55-1.02)  mg/dL


 


Est Cr Clr Drug Dosing    TNP  


 


Estimated GFR (MDRD)    29  (>60)  mL/min


 


BUN/Creatinine Ratio    42.9 H  (14-18)  


 


Glucose    113  ()  mg/dL


 


POC Glucose     ()  mg/dL


 


Lactic Acid     (0.4-2.0)  mmol/L


 


Calcium    9.3  (8.5-10.1)  mg/dL


 


Phosphorus     (2.6-4.7)  mg/dL


 


Magnesium    1.7 L  (1.8-2.4)  mg/dl


 


Total Bilirubin    0.5  (0.2-1.0)  mg/dL


 


AST    96 H  (15-37)  U/L


 


ALT    133 H  (14-59)  U/L


 


Alkaline Phosphatase    108  ()  U/L


 


CK-MB (CK-2)    0.6  (0-3.6)  ng/ml


 


Troponin I    < 0.017  (0.00-0.056)  ng/mL


 


C-Reactive Protein    3.9 H*  (<1.0)  mg/dL


 


NT-Pro-B Natriuret Pep     (0-450)  pg/mL


 


Total Protein    6.3 L  (6.4-8.2)  g/dl


 


Albumin    3.2 L  (3.4-5.0)  g/dl


 


Globulin    3.1  gm/dL


 


Albumin/Globulin Ratio    1.0  (1-2)  


 


Mycoplasma pneumon IgM     (NEGATIVE)  


 


MRSA (PCR)     














  12/17/19 12/17/19 12/17/19 Range/Units





  11:23 11:23 11:23 


 


WBC     (3.98-10.04)  K/mm3


 


RBC     (3.98-5.22)  M/mm3


 


Hgb     (11.2-15.7)  gm/dl


 


Hct     (34.1-44.9)  %


 


MCV     (79.4-94.8)  fl


 


MCH     (25.6-32.2)  pg


 


MCHC     (32.2-35.5)  g/dl


 


RDW Std Deviation     (36.4-46.3)  fL


 


Plt Count     (182-369)  K/mm3


 


MPV     (9.4-12.3)  fl


 


Neut % (Auto)     (34.0-71.1)  %


 


Lymph % (Auto)     (19.3-51.7)  %


 


Mono % (Auto)     (4.7-12.5)  %


 


Eos % (Auto)     (0.7-5.8)  


 


Baso % (Auto)     (0.1-1.2)  %


 


Neut # (Auto)     (1.56-6.13)  K/mm3


 


Lymph # (Auto)     (1.18-3.74)  K/mm3


 


Mono # (Auto)     (0.24-0.36)  K/mm3


 


Eos # (Auto)     (0.04-0.36)  K/mm3


 


Baso # (Auto)     (0.01-0.08)  K/mm3


 


Neutrophils % (Manual)     (40-60)  %


 


Band Neutrophils %     (0-10)  %


 


Lymphocytes % (Manual)     (20-40)  %


 


Atypical Lymphs %     %


 


Monocytes % (Manual)     (2-10)  %


 


Eosinophils % (Manual)     (0.7-5.8)  %


 


Basophils % (Manual)     (0.1-1.2)  


 


Platelet Estimate     


 


Anisocytosis     


 


RBC Morph Comment     


 


ESR  54 H    (0-20)  mm/hr


 


PT     (9.7-12.0)  SECONDS


 


INR     


 


APTT     (22-31)  SECONDS


 


Sodium     (136-145)  mEq/L


 


Potassium     (3.5-5.1)  mEq/L


 


Chloride     ()  mEq/L


 


Carbon Dioxide     (21-32)  mEq/L


 


Anion Gap     (5-15)  


 


BUN     (7-18)  mg/dL


 


Creatinine     (0.55-1.02)  mg/dL


 


Est Cr Clr Drug Dosing     


 


Estimated GFR (MDRD)     (>60)  mL/min


 


BUN/Creatinine Ratio     (14-18)  


 


Glucose     ()  mg/dL


 


POC Glucose     ()  mg/dL


 


Lactic Acid    1.2  (0.4-2.0)  mmol/L


 


Calcium     (8.5-10.1)  mg/dL


 


Phosphorus     (2.6-4.7)  mg/dL


 


Magnesium     (1.8-2.4)  mg/dl


 


Total Bilirubin     (0.2-1.0)  mg/dL


 


AST     (15-37)  U/L


 


ALT     (14-59)  U/L


 


Alkaline Phosphatase     ()  U/L


 


CK-MB (CK-2)     (0-3.6)  ng/ml


 


Troponin I     (0.00-0.056)  ng/mL


 


C-Reactive Protein     (<1.0)  mg/dL


 


NT-Pro-B Natriuret Pep   486 H   (0-450)  pg/mL


 


Total Protein     (6.4-8.2)  g/dl


 


Albumin     (3.4-5.0)  g/dl


 


Globulin     gm/dL


 


Albumin/Globulin Ratio     (1-2)  


 


Mycoplasma pneumon IgM     (NEGATIVE)  


 


MRSA (PCR)     














  12/17/19 12/17/19 12/17/19 Range/Units





  11:23 11:55 16:07 


 


WBC     (3.98-10.04)  K/mm3


 


RBC     (3.98-5.22)  M/mm3


 


Hgb     (11.2-15.7)  gm/dl


 


Hct     (34.1-44.9)  %


 


MCV     (79.4-94.8)  fl


 


MCH     (25.6-32.2)  pg


 


MCHC     (32.2-35.5)  g/dl


 


RDW Std Deviation     (36.4-46.3)  fL


 


Plt Count     (182-369)  K/mm3


 


MPV     (9.4-12.3)  fl


 


Neut % (Auto)     (34.0-71.1)  %


 


Lymph % (Auto)     (19.3-51.7)  %


 


Mono % (Auto)     (4.7-12.5)  %


 


Eos % (Auto)     (0.7-5.8)  


 


Baso % (Auto)     (0.1-1.2)  %


 


Neut # (Auto)     (1.56-6.13)  K/mm3


 


Lymph # (Auto)     (1.18-3.74)  K/mm3


 


Mono # (Auto)     (0.24-0.36)  K/mm3


 


Eos # (Auto)     (0.04-0.36)  K/mm3


 


Baso # (Auto)     (0.01-0.08)  K/mm3


 


Neutrophils % (Manual)     (40-60)  %


 


Band Neutrophils %     (0-10)  %


 


Lymphocytes % (Manual)     (20-40)  %


 


Atypical Lymphs %     %


 


Monocytes % (Manual)     (2-10)  %


 


Eosinophils % (Manual)     (0.7-5.8)  %


 


Basophils % (Manual)     (0.1-1.2)  


 


Platelet Estimate     


 


Anisocytosis     


 


RBC Morph Comment     


 


ESR     (0-20)  mm/hr


 


PT     (9.7-12.0)  SECONDS


 


INR     


 


APTT     (22-31)  SECONDS


 


Sodium     (136-145)  mEq/L


 


Potassium     (3.5-5.1)  mEq/L


 


Chloride     ()  mEq/L


 


Carbon Dioxide     (21-32)  mEq/L


 


Anion Gap     (5-15)  


 


BUN     (7-18)  mg/dL


 


Creatinine     (0.55-1.02)  mg/dL


 


Est Cr Clr Drug Dosing     


 


Estimated GFR (MDRD)     (>60)  mL/min


 


BUN/Creatinine Ratio     (14-18)  


 


Glucose     ()  mg/dL


 


POC Glucose   133 H   ()  mg/dL


 


Lactic Acid     (0.4-2.0)  mmol/L


 


Calcium     (8.5-10.1)  mg/dL


 


Phosphorus     (2.6-4.7)  mg/dL


 


Magnesium  1.8    (1.8-2.4)  mg/dl


 


Total Bilirubin     (0.2-1.0)  mg/dL


 


AST     (15-37)  U/L


 


ALT     (14-59)  U/L


 


Alkaline Phosphatase     ()  U/L


 


CK-MB (CK-2)     (0-3.6)  ng/ml


 


Troponin I     (0.00-0.056)  ng/mL


 


C-Reactive Protein     (<1.0)  mg/dL


 


NT-Pro-B Natriuret Pep     (0-450)  pg/mL


 


Total Protein     (6.4-8.2)  g/dl


 


Albumin     (3.4-5.0)  g/dl


 


Globulin     gm/dL


 


Albumin/Globulin Ratio     (1-2)  


 


Mycoplasma pneumon IgM  Positive H    (NEGATIVE)  


 


MRSA (PCR)    Negative  














  12/18/19 12/18/19 Range/Units





  04:55 04:55 


 


WBC  10.03   (3.98-10.04)  K/mm3


 


RBC  3.51 L   (3.98-5.22)  M/mm3


 


Hgb  11.1 L   (11.2-15.7)  gm/dl


 


Hct  35.3   (34.1-44.9)  %


 


MCV  100.6 H   (79.4-94.8)  fl


 


MCH  31.6   (25.6-32.2)  pg


 


MCHC  31.4 L   (32.2-35.5)  g/dl


 


RDW Std Deviation  54.2 H   (36.4-46.3)  fL


 


Plt Count  260   (182-369)  K/mm3


 


MPV  11.0   (9.4-12.3)  fl


 


Neut % (Auto)  82.9 H   (34.0-71.1)  %


 


Lymph % (Auto)  10.3 L   (19.3-51.7)  %


 


Mono % (Auto)  5.9   (4.7-12.5)  %


 


Eos % (Auto)  0 L   (0.7-5.8)  


 


Baso % (Auto)  0.0 L   (0.1-1.2)  %


 


Neut # (Auto)  8.32 H   (1.56-6.13)  K/mm3


 


Lymph # (Auto)  1.03 L   (1.18-3.74)  K/mm3


 


Mono # (Auto)  0.59 H   (0.24-0.36)  K/mm3


 


Eos # (Auto)  0.00 L   (0.04-0.36)  K/mm3


 


Baso # (Auto)  0.00 L   (0.01-0.08)  K/mm3


 


Neutrophils % (Manual)    (40-60)  %


 


Band Neutrophils %    (0-10)  %


 


Lymphocytes % (Manual)    (20-40)  %


 


Atypical Lymphs %    %


 


Monocytes % (Manual)    (2-10)  %


 


Eosinophils % (Manual)    (0.7-5.8)  %


 


Basophils % (Manual)    (0.1-1.2)  


 


Platelet Estimate    


 


Anisocytosis    


 


RBC Morph Comment    


 


ESR    (0-20)  mm/hr


 


PT    (9.7-12.0)  SECONDS


 


INR    


 


APTT    (22-31)  SECONDS


 


Sodium   139  (136-145)  mEq/L


 


Potassium   4.1  (3.5-5.1)  mEq/L


 


Chloride   101  ()  mEq/L


 


Carbon Dioxide   28  (21-32)  mEq/L


 


Anion Gap   14.1  (5-15)  


 


BUN   63 H  (7-18)  mg/dL


 


Creatinine   1.6 H  (0.55-1.02)  mg/dL


 


Est Cr Clr Drug Dosing   21.65  


 


Estimated GFR (MDRD)   31  (>60)  mL/min


 


BUN/Creatinine Ratio   39.4 H  (14-18)  


 


Glucose   116 H  ()  mg/dL


 


POC Glucose    ()  mg/dL


 


Lactic Acid    (0.4-2.0)  mmol/L


 


Calcium   9.6  (8.5-10.1)  mg/dL


 


Phosphorus   3.4  (2.6-4.7)  mg/dL


 


Magnesium   2.4  (1.8-2.4)  mg/dl


 


Total Bilirubin   0.3  (0.2-1.0)  mg/dL


 


AST   68 H  (15-37)  U/L


 


ALT   129 H  (14-59)  U/L


 


Alkaline Phosphatase   111  ()  U/L


 


CK-MB (CK-2)    (0-3.6)  ng/ml


 


Troponin I    (0.00-0.056)  ng/mL


 


C-Reactive Protein    (<1.0)  mg/dL


 


NT-Pro-B Natriuret Pep    (0-450)  pg/mL


 


Total Protein   5.9 L  (6.4-8.2)  g/dl


 


Albumin   3.1 L  (3.4-5.0)  g/dl


 


Globulin   2.8  gm/dL


 


Albumin/Globulin Ratio   1.1  (1-2)  


 


Mycoplasma pneumon IgM    (NEGATIVE)  


 


MRSA (PCR)    











[facility] Results Last 24 Hours: 


 Microbiology











 12/17/19 21:25 Gram Stain - Preliminary





 Sputum - Expectorated 


 


 12/17/19 11:05 Influenza Type A Antigen Screen - Final





 Nasal Aspirate, Unspecified    NEGATIVE INFLUENZA A VIRUS AG





    REFERENCE RANGE: NEGATIVE





 Influenza Type B Antigen Screen - Final





    NEGATIVE INFLUENZA B VIRUS AG





    REFERENCE RANGE: NEGATIVE











Med Orders - Current: 


 Current Medications





Acetaminophen (Tylenol)  650 mg PO Q4H PRN


   PRN Reason: Pain (Mild 1-3)/fever


Albuterol/Ipratropium (Duoneb 3.0-0.5 Mg/3 Ml)  3 ml NEB QIDRT [facility]


   Last Admin: 12/18/19 05:10 Dose:  3 ml


Benzonatate (Tessalon Perles)  100 mg PO TID PRN


   PRN Reason: Cough


   Last Admin: 12/18/19 05:01 Dose:  100 mg


Ceftriaxone Sodium 2 gm/ (Sodium Chloride)  100 mls @ 200 mls/hr IV Q24H [facility]


   Last Admin: 12/17/19 11:38 Dose:  200 mls/hr


Non-Formulary Medication (Polyvinyl Alcohol/Povidone/Pf [Refresh Classic Eye 

Drops])  1 each OP TID [facility]


Ondansetron HCl (Zofran)  4 mg IV Q6H PRN


   PRN Reason: Nausea/Vomiting





Discontinued Medications





Acetaminophen (Tylenol)  650 mg PO NOW ONE


   Stop: 12/17/19 10:38


   Last Admin: 12/17/19 11:13 Dose:  650 mg


Albuterol/Ipratropium (Duoneb 3.0-0.5 Mg/3 Ml)  3 ml NEB ONETIME ONE


   Stop: 12/17/19 10:48


   Last Admin: 12/17/19 11:06 Dose:  3 ml


Albuterol/Ipratropium (Duoneb 3.0-0.5 Mg/3 Ml)  3 ml NEB QID CHON


Dextrose/Sodium Chloride (Dextrose 5%-Normal Saline)  1,000 mls @ 100 mls/hr IV 

ASDIRECTED [facility]


   Last Admin: 12/17/19 11:24 Dose:  100 mls/hr


Sodium Chloride (Normal Saline)  500 mls @ 250 mls/hr IV .BOLUS ONE


   Stop: 12/17/19 14:34


   Last Admin: 12/17/19 13:23 Dose:  250 mls/hr


Magnesium Sulfate 2 gm/ Premix  50 mls @ 25 mls/hr IV ONETIME ONE


   Stop: 12/17/19 16:17


   Last Admin: 12/17/19 16:09 Dose:  25 mls/hr











- Exam


Quality Assessment: DVT Prophylaxis.  No: Supplemental Oxygen


General: Alert, Oriented, Cooperative, No Acute Distress


HEENT: Pupils Equal, Pupils Reactive, Mucous Membr. Moist/Pink


Neck: Supple, Trachea Midline


Lungs: Normal Respiratory Effort, Decreased Breath Sounds, Rhonchi


Cardiovascular: Regular Rate, Regular Rhythm


GI/Abdominal Exam: Normal Bowel Sounds, Soft, Non-Tender, No Organomegaly, No 

Distention


 (Female) Exam: Deferred


Back Exam: Normal Inspection, Decreased Range of Motion


Extremities: Normal Inspection, Non-Tender, Normal Capillary Refill, Pedal Edema

, Limited Range of Motion


Skin: Warm, Dry, Intact, Ecchymosis (scattered face, upper, and lower 

extremities )


Wound/Incisions: Dressing Dry and Intact.  No: Erythema


Neurological: No New Focal Deficit


Psy/Mental Status: Alert, Normal Affect, Normal Mood





Sepsis Event Note





- Evaluation


Sepsis Screening Result: No Definite Risk





- Focused Exam


Vital Signs: 


 Vital Signs











  Temp Pulse Resp BP Pulse Ox


 


 12/18/19 04:53     152/77 H 


 


 12/18/19 04:51   78  20  88/71 L  97


 


 12/17/19 23:38  97.7 F  80  18  159/68 H  94 L


 


 12/17/19 19:39  97.5 F  78  18  133/66  96











Date Exam was Performed: 12/18/19


Time Exam was Performed: 14:20





- Problem List & Annotations


(1) Volume depletion


SNOMED Code(s): 20199257


   Code(s): E86.9 - VOLUME DEPLETION, UNSPECIFIED   Status: Acute   Priority: 

High   Current Visit: Yes   





(2) Acute febrile illness


SNOMED Code(s): 257267713


   Code(s): R50.9 - FEVER, UNSPECIFIED   Status: Acute   Priority: High   

Current Visit: Yes   





(3) Chronic renal insufficiency, stage IV (severe)


SNOMED Code(s): 272453272


   Code(s): N18.4 - CHRONIC KIDNEY DISEASE, STAGE 4 (SEVERE)   Status: Chronic 

  Priority: Medium   Current Visit: Yes   





(4) Pneumonia


SNOMED Code(s): 703430816


   Code(s): J18.9 - PNEUMONIA, UNSPECIFIED ORGANISM   Status: Acute   Priority: 

High   Current Visit: Yes   


Qualifiers: 


   Pneumonia type: due to Mycoplasma pneumoniae   Laterality: unspecified 

laterality   Lung location: unspecified part of lung   Qualified Code(s): J15.7 

- Pneumonia due to Mycoplasma pneumoniae   





(5) Bilateral knee pain


SNOMED Code(s): 00366202


   Code(s): M25.561 - PAIN IN RIGHT KNEE; M25.562 - PAIN IN LEFT KNEE   Status: 

Chronic   Priority: Medium   Current Visit: No   


Qualifiers: 


   Chronicity: acute   Qualified Code(s): M25.561 - Pain in right knee; M25.562 

- Pain in left knee   





(6) Facial contusion


SNOMED Code(s): 832588703


   Code(s): S00.83XA - CONTUSION OF OTHER PART OF HEAD, INITIAL ENCOUNTER   

Status: Chronic   Priority: Medium   Current Visit: No   


Qualifiers: 


   Encounter type: initial encounter   Qualified Code(s): S00.83XA - Contusion 

of other part of head, initial encounter   





(7) Laceration of left forearm


SNOMED Code(s): 47305726437814455


   Code(s): S51.812A - LACERATION WITHOUT FOREIGN BODY OF LEFT FOREARM, INIT 

ENCNTR   Status: Chronic   Priority: Medium   Current Visit: No   


Qualifiers: 


   Encounter type: subsequent encounter   Qualified Code(s): S51.812D - 

Laceration without foreign body of left forearm, subsequent encounter   





(8) Paroxysmal atrial fibrillation


SNOMED Code(s): 125323319


   Code(s): I48.0 - PAROXYSMAL ATRIAL FIBRILLATION   Status: Chronic   Priority

: Low   Current Visit: No   





(9) Peripheral edema


SNOMED Code(s): 875295888


   Code(s): R60.9 - EDEMA, UNSPECIFIED   Status: Chronic   Priority: Medium   

Current Visit: No   





(10) Hypomagnesemia


SNOMED Code(s): 909133018


   Code(s): E83.42 - HYPOMAGNESEMIA   Status: Acute   Priority: High   Current 

Visit: Yes   





- Problem List Review


Problem List Initiated/Reviewed/Updated: Yes





- My Orders


Last 24 Hours: 


My Active Orders





12/17/19 14:07


Cardiac Monitoring [RC] CONTINUOUS 


Height and Weight [RC] 04 


Pulse Oximetry [RC] PRN 


Up With Assistance [RC] ASDIRECTED 


VTE/DVT Education [RC] BID 


Vital Signs [RC] Q4HR 


Consult to Case Management/ [CONS] Routine 


Consult to Spiritual Care [CONS] Routine 


OT Evaluation and Treatment [CONS] Routine 


PT Evaluation and Treatment [CONS] Routine 


Respiratory Care Assess and Treatment [CONS] Routine 


Acetaminophen [Tylenol]   650 mg PO Q4H PRN 


Ondansetron [Zofran]   4 mg IV Q6H PRN 





12/17/19 14:08


RT Aerosol Therapy [RC] ASDIRECTED 





12/17/19 14:10


RT Incentive Spirometry [RC] ASDIRECTED 


Isolation [COMM] Routine 





12/17/19 14:12


Acapella [RT Chest Physiotherapy] [RC] ASDIRECTED 





12/17/19 14:37


Code Status [Resuscitation Status] Stat 





12/17/19 14:40


Precautions [COMM] Routine 





12/17/19 15:39


STREP PNEUMONIAE ANTIGEN [MREF] Routine 





12/17/19 16:00


Albuterol/Ipratropium [DuoNeb 3.0-0.5 MG/3 ML]   3 ml NEB QIDRT 





12/17/19 16:07


RESPIRATORY PANEL Routine 





12/17/19 Dinner


Heart Healthy Diet [DIET] 














- Plan


Plan:: 


I/P:





Acute:





Mycoplasma Pneumonia


   -Resides at SNF


   -Reports symptoms for past few weeks, SNF staff reports symptoms for past 48 

hrs


   -Congested productive cough


   -Hx/o recurrent PNA


   -No fever in ED or on floor


   -WBC 11.67-->10.03


   -CRP 3.9


   -Lactic acid 1.2


   -Started on Rocephin in ED - continue


   -Start Doxycycline 100mg BID


   -500mL fluid bolus given in ED


   -Tylenol for fever


   -Blood cultures negative


   -IV fluids as ordered


   -Mycoplasma POSITIVE 


   -Strep pneumonia, viral panel - pending


   -Sputum culture pending


   -Negative influenza


   -IS/Acapella/RT


   -Duonebs as ordered


   -Droplet isolation


   -Consider Repeat CXR in 24-48 hrs if indicated 


   


Volume depletion, resolved 


   -Clinically dry


   -Anion gap 15.4--> 14.1


   -Reports poor oral intake last 24-48 hours


   -IV fluids as ordered -> discontinue 





Chronic kidney failure, Stage IV, stable


   -BUN 73-->63


   -Creatinine 1.7-->1.6


   -GFR of 29-->31


   -Baseline creatinine appears to be 1.7-2.2; GFR of 24-31


   -Stable


   -Avoid nephrotoxic agents if possible


   -IV fluids as above





Hypomagnesemia, Resolved 


   -Magnesium 1.7-->2.4


   -Supplemented





Pedal edema


   -Chronic bilateral pedal edema


   -Hx/o lymphedema


   -Home Velcro compression stockings


   -Elevation  


   -Continue home Bumex


   -Caution with IV hydration





Chronic pain


   -Reports chronic bilateral shoulder, bilateral knee, bilateral hip, lower 

back, and neck pain


   -Home pain medications as ordered


   -PT/OT


   -Hx/o gout - allopurinol 


   -ESR 54, CRP 3.9





Chronic:


A. fib on eliquis 


HLD


hypertension


syncope


recurrent pneumonia


GERD


arthritis


vertigo


obesity


lymphedema


basal cell carcinoma


breast cancer





Plan:


Admit to medical floor on telemetry 


Home medications as ordered


Routine AM labs


CM/SW for discharge planning: resident of [facility]


Other orders as indicated above


DVT Prophylaxis: Home Eliquis, Home compression stockings; GI Prophylaxis: Not 

indicated


Code status: DNR; PCP: Dr. Horta

## 2019-12-19 NOTE — [id number]
**Discharge Summary





- Hospital Course


HPI Initial Comments: 


Kamla Oakes is an 85 yo female who presents to our ED on 12/17/19 with 

shortness of breath. She currently resides at [facility].  She 

is noted to have a productive bilateral cough and fever.  Symptoms reportedly 

began with the last 48 hours per SNF report, however the patient reports she 

has not felt well for the past few weeks.  She been given Tylenol at the 

nursing home with some relief.  Reports she did have a flu shot this year.  She'

s had very poor oral intake today but she did eat well yesterday.  She reports 

no vomiting.  She does have multiple ecchymosis to her face and bilateral upper 

extremities, along with both knees from a fall that occurred on November 2.  

She is noted to have a very large skin tear on the extensor surface of her 

right forearm.





In the ED temperature is 98.3 Fahrenheit.  Pulse 86.  Respirations 16.  

Saturations are 92%.  Labs were obtained showing mild leukocytosis at 11.67 

with a hemoglobin of 11.6 and hematocrit of 36.2.  Blood sugar 259,000.  

Neutrophils are elevated at 88% however there is no bandemia.  ESR is high at 

54.  8.  INR 0.99.  APTT is 27.  Sodium is 137.  Potassium 4.4.  Anion gap was 

high at 15.4.  BUN is 73.  Creatinine 1.7.  EGFR is 29.  Lactic acid is 1.2.  

Magnesium is low at 1.7.  AST is 96, , alkaline phosphatase 108.  CK-MB 

is 0.6.  Troponin is negative at less than 0.017.  CRP is 3.9.  ProBNP is 46.  

Protein is low at 6.3.  Blood cultures are obtained and a sputum culture is 

ordered.  Twelve-lead EKG shows ectopic atrial rhythm at a rate of 78 bpm.  Q 

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  There is T-

wave flattening noted in aVL.Q waves are noted in III and aVF consider old 

inferior wall MI.  QTC is mildly prolonged at 477.  She is given 650 mg Tylenol 

and a 3 ml DuoNeb. She is placed on 2L O2. She started on 2 g of IV Rocephin.  

She is given a 500 mL saline bolus and started on 100 mils an hour of D5NS.  

CXR is obtained showing incidental findings with nothing acute.





History of: A. fib, HLD, hypertension, syncope, recurrent pneumonia, GERD, 

chronic renal insufficiency, arthritis, gout, chronic bilateral shoulder pain, 

chronic bilateral knee pain, vertigo, obesity, lymphedema, basal cell carcinoma

, breast cancer.  She was never a smoker.  Her PCP is Dr. Horta.





Diagnosis: Stroke: No





- Discharge Data


Discharge Date: 12/19/19 (Admit date: 12/17/19)


Discharge Disposition: DC/Tfer to SNF 03


Condition: Good





- Referral to Home Health


Primary Care Physician: 


Pravin Horta MD








- Discharge Diagnosis/Problem(s)


(1) Volume depletion


SNOMED Code(s): 86814040


   ICD Code: E86.9 - VOLUME DEPLETION, UNSPECIFIED   Status: Resolved   Priority

: High   Current Visit: Yes   





(2) Acute febrile illness


SNOMED Code(s): 848677715


   ICD Code: R50.9 - FEVER, UNSPECIFIED   Status: Resolved   Priority: High   

Current Visit: Yes   





(3) Chronic renal insufficiency, stage IV (severe)


SNOMED Code(s): 881550505


   ICD Code: N18.4 - CHRONIC KIDNEY DISEASE, STAGE 4 (SEVERE)   Status: Chronic

   Priority: Medium   Current Visit: Yes   





(4) Pneumonia


SNOMED Code(s): 370301998


   ICD Code: J18.9 - PNEUMONIA, UNSPECIFIED ORGANISM   Status: Acute   Priority

: High   Current Visit: Yes   


Qualifiers: 


   Pneumonia type: due to Mycoplasma pneumoniae   Laterality: unspecified 

laterality   Lung location: unspecified part of lung   Qualified Code(s): J15.7 

- Pneumonia due to Mycoplasma pneumoniae   





(5) Bilateral knee pain


SNOMED Code(s): 18273679


   ICD Code: M25.561 - PAIN IN RIGHT KNEE; M25.562 - PAIN IN LEFT KNEE   Status

: Chronic   Priority: Medium   Current Visit: No   


Qualifiers: 


   Chronicity: acute   Qualified Code(s): M25.561 - Pain in right knee; M25.562 

- Pain in left knee   





(6) Facial contusion


SNOMED Code(s): 491170820


   ICD Code: S00.83XA - CONTUSION OF OTHER PART OF HEAD, INITIAL ENCOUNTER   

Status: Chronic   Priority: Medium   Current Visit: No   


Qualifiers: 


   Encounter type: initial encounter   Qualified Code(s): S00.83XA - Contusion 

of other part of head, initial encounter   





(7) Laceration of left forearm


SNOMED Code(s): 64877278767997882


   ICD Code: S51.812A - LACERATION WITHOUT FOREIGN BODY OF LEFT FOREARM, INIT 

ENCNTR   Status: Chronic   Priority: Medium   Current Visit: No   


Qualifiers: 


   Encounter type: subsequent encounter   Qualified Code(s): S51.812D - 

Laceration without foreign body of left forearm, subsequent encounter   





(8) Paroxysmal atrial fibrillation


SNOMED Code(s): 857321872


   ICD Code: I48.0 - PAROXYSMAL ATRIAL FIBRILLATION   Status: Chronic   Priority

: Low   Current Visit: No   





(9) Peripheral edema


SNOMED Code(s): 511678682


   ICD Code: R60.9 - EDEMA, UNSPECIFIED   Status: Chronic   Priority: Medium   

Current Visit: No   





(10) Hypomagnesemia


SNOMED Code(s): 534385002


   ICD Code: E83.42 - HYPOMAGNESEMIA   Status: Resolved   Priority: High   

Current Visit: Yes   





(11) Parainfluenza


SNOMED Code(s): 68915569


   ICD Code: B34.8 - OTHER VIRAL INFECTIONS OF UNSPECIFIED SITE   Status: Acute

   Priority: High   Current Visit: Yes   





- Patient Summary/Data


Consults: 


 Consultations





12/17/19 14:07


Consult to Case Management/ [CONS] Routine 


Consult to Spiritual Care [CONS] Routine 


OT Evaluation and Treatment [CONS] Routine 


PT Evaluation and Treatment [CONS] Routine 


Respiratory Care Assess and Treatment [CONS] Routine 











Labs Pending at D/C: 


None





Recommended Follow-up Testing/Procedures: 


Follow-up with PCP within 5-7 days of discharge





Hospital Course: 


Kamla Oakes was admitted to the floor with shortness of breath, hypoxia, 

and pneumonia. She was treated with scheduled nebulizers, IS, Acapella, and 

Mucomyst.  She was started on Rocephin initially and when her mycoplasma 

returned +100 mg twice a day doxycycline was started.  Rocephin was ultimately 

stopped.  Her viral panel revealed positive parainfluenza.  She continued to 

improve and was able to be weaned off of oxygen.  She reported she felt much 

better.  She did work with PT/OT who felt that she should return to SNF with 

continued PT/OT services.  She does have scheduled nebulizers already as a home 

medication.  Discharge planning did contact [facility] to 

ensure that she had plenty of supplies for these.  She was instructed to 

continue to utilize her incentive spirometry and Acapella until her symptoms 

resolve.  PCP requested that I ensure the nursing home continues to work on 

diuresis as they have been making progress with this.  Home medications were 

continued with the exception of her home azithromycin.  She should continue 

doxycycline as prescribed with 11 more doses of 100 mg twice a day, first dose 

tonight (12/19/19).  Follow-up with her primary care provider within 5-7 days, 

sooner if needed.  She was instructed that this will likely take time to resolve

, therefore she feels back to her normal self.  She was instructed to return to 

the emergency room or contact her primary care provider should symptoms return 

or worsen.








- Patient Instructions


Diet: Heart Healthy Diet


Activity: As Tolerated


Driving: Do Not Drive


Showering/Bathing: May Shower


Notify Provider of: Fever, Increased Pain, Nausea and/or Vomiting


Other/Special Instructions: Follow-up with primary care provider within 5-7 

days of discharge, sooner if needed.  Resume home medications as indicated.  

Continue doxycycline as prescribed.  Continue to utilize incentive spirometry (

clear/blue tube you inhale through) and acapella (green tube you blow throught) 

until symptoms completely resolve.  Per Dr. Horta: Continue to monitor 

edema. Follow-up with him as needed for worsening swelling/weight gain.  

Continue PT/OT at SNF.  Should symptoms return or worsen, contact primary care 

provider: Dr. Horta, or return to the Emergency Department.





- Discharge Plan


*PRESCRIPTION DRUG MONITORING PROGRAM REVIEWED*: No


*COPY OF PRESCRIPTION DRUG MONITORING REPORT IN PATIENT CLAY: No


Prescriptions/Med Rec: 


Doxycycline [Vibramycin] 100 mg PO BID #11 cap


Ipratropium/Albuterol Sulfate [Iprat-Albut 0.5-3(2.5) MG/3 ML] 3 ml IH QID #15 

ampul.[facility]


Home Medications: 


 Home Meds





Acetaminophen [Tylenol] 325 mg PO Q6HR PRN 12/23/18 [History]


Bumetanide [Bumex] 2 mg PO DAILY 12/23/18 [History]


Loratadine 10 mg PO DAILY PRN 12/23/18 [History]


Multivitamin [Multi-Vitamin Daily] 1 tab PO DAILY 12/23/18 [History]


Simvastatin 20 mg PO BEDTIME 12/23/18 [History]


allopurinoL [Zyloprim] 100 mg PO BID 12/23/18 [History]


traMADol [Ultram] 50 mg PO BID PRN 12/23/18 [History]


Amiodarone [Cordarone] 200 mg PO DAILY #30 tab 12/27/18 [Rx]


Apixaban [Eliquis] 2.5 mg PO BID #30 12/27/18 [Rx]


Ascorbic Acid 500 mg PO BID #30 12/27/18 [Rx]


Ferrous Sulfate [Iron] 325 mg PO BID #60 tablet 12/27/18 [Rx]


Acetaminophen [Tylenol] 650 mg PO QID 04/15/19 [History]


Famotidine [Pepcid] 20 mg PO DAILY 04/15/19 [History]


Sennosides/Docusate Sodium [Senna Plus Tablet] 1 tab PO BID PRN 04/15/19 [

History]


Benzonatate [Tessalon Perle] 100 mg PO TID PRN 12/17/19 [History]


Bumetanide 1 mg PO QPM 12/17/19 [History]


Niacin (Inositol Niacinate) [Niacin Flush Free 500 mg Cap] 400 mg PO DAILY 12/17 /19 [History]


Polyethylene Glycol 3350 [MiraLAX] 17 gm PO DAILY PRN 12/17/19 [History]


Polyvinyl Alcohol/Povidone/Pf [Refresh Classic Eye Drops] 1 each EYEBOTH TID 12/ 17/19 [History]


Potassium Chloride 10 meq PO DAILY 12/17/19 [History]


Sennosides/Docusate Sodium [Senna Plus 8.6-50 mg Tablet] 1 tab PO BID 12/17/19 [

History]


guaiFENesin [Mucinex] 600 mg PO BID 12/17/19 [History]


oxyCODONE 5 mg PO BID 12/17/19 [History]


oxyCODONE 5 mg PO Q4HR PRN 12/17/19 [History]


Doxycycline [Vibramycin] 100 mg PO BID #11 cap 12/19/19 [Rx]


Ipratropium/Albuterol Sulfate [Iprat-Albut 0.5-3(2.5) MG/3 ML] 3 ml IH QID #15 

ampul.neb 12/19/19 [Rx]








Oxygen Therapy Mode: Room Air


Forms:  ED Department Discharge


Referrals: 


Pravin Horta MD [Primary Care Provider] - 





- Discharge Summary/Plan Comment


DC Time >30 min.: Yes (45 mins )





- General Info


Date of Service: 12/19/19


Admission Dx/Problem (Free Text: 


Pneumonia 





Functional Status: Reports: Pain Controlled, Tolerating Diet, Ambulating, 

Urinating, Incentive Spirometry, Other (Acapella ).  Denies: New Symptoms





- Review of Systems


General: Reports: No Symptoms.  Denies: Fever, Weakness, Fatigue, Malaise, 

Chills


HEENT: Reports: No Symptoms.  Denies: Headaches, Sore Throat


Pulmonary: Reports: Cough (occasional but improved).  Denies: Shortness of 

Breath, Sputum, Wheezing


Cardiovascular: Reports: Dyspnea on Exertion (improved ), Edema.  Denies: Chest 

Pain, Palpitations


Gastrointestinal: Reports: No Symptoms.  Denies: Abdominal Pain, Constipation, 

Diarrhea, Nausea, Vomiting


Genitourinary: Reports: No Symptoms.  Denies: Pain


Musculoskeletal: Reports: Neck Pain (chronic), Back Pain (chronic ), Joint Pain 

(Hips and knees - chronic )


Skin: Reports: No Symptoms.  Denies: Cyanosis


Neurological: Reports: No Symptoms.  Denies: Confusion, Difficulty Walking, 

Gait Disturbance


Psychiatric: Reports: No Symptoms





- Patient Data


Vitals - Most Recent: 


 Last Vital Signs











Temp  98.2 F   12/19/19 03:08


 


Pulse  85   12/19/19 03:08


 


Resp  18   12/19/19 03:08


 


BP  143/69 H  12/19/19 03:08


 


Pulse Ox  98   12/19/19 09:54











Weight - Most Recent: 140 lb 14.4 oz


I&O - Last 24 hours: 


 Intake & Output











 12/18/19 12/19/19 12/19/19





 22:59 06:59 14:59


 


Intake Total 1420 800 180


 


Output Total 300 550 


 


Balance 1120 250 180











Lab Results - Last 24 hrs: 


 Laboratory Results - last 24 hr











  12/17/19 12/19/19 12/19/19 Range/Units





  16:07 05:10 05:10 


 


WBC   11.49 H   (3.98-10.04)  K/mm3


 


RBC   3.35 L   (3.98-5.22)  M/mm3


 


Hgb   10.5 L   (11.2-15.7)  gm/dl


 


Hct   33.5 L   (34.1-44.9)  %


 


MCV   100.0 H   (79.4-94.8)  fl


 


MCH   31.3   (25.6-32.2)  pg


 


MCHC   31.3 L   (32.2-35.5)  g/dl


 


RDW Std Deviation   53.4 H   (36.4-46.3)  fL


 


Plt Count   242   (182-369)  K/mm3


 


MPV   10.2   (9.4-12.3)  fl


 


Neut % (Auto)   84.1 H   (34.0-71.1)  %


 


Lymph % (Auto)   8.7 L   (19.3-51.7)  %


 


Mono % (Auto)   6.3   (4.7-12.5)  %


 


Eos % (Auto)   0.1 L   (0.7-5.8)  


 


Baso % (Auto)   0.1   (0.1-1.2)  %


 


Neut # (Auto)   9.67 H   (1.56-6.13)  K/mm3


 


Lymph # (Auto)   1.00 L   (1.18-3.74)  K/mm3


 


Mono # (Auto)   0.72 H   (0.24-0.36)  K/mm3


 


Eos # (Auto)   0.01 L   (0.04-0.36)  K/mm3


 


Baso # (Auto)   0.01   (0.01-0.08)  K/mm3


 


Manual Slide Review   Abnormal smear   


 


Sodium    138  (136-145)  mEq/L


 


Potassium    3.6  (3.5-5.1)  mEq/L


 


Chloride    101  ()  mEq/L


 


Carbon Dioxide    27  (21-32)  mEq/L


 


Anion Gap    13.6  (5-15)  


 


BUN    55 H  (7-18)  mg/dL


 


Creatinine    1.7 H  (0.55-1.02)  mg/dL


 


Est Cr Clr Drug Dosing    20.38  mL/min


 


Estimated GFR (MDRD)    29  (>60)  mL/min


 


BUN/Creatinine Ratio    32.4 H  (14-18)  


 


Glucose    129 H  ()  mg/dL


 


Calcium    9.1  (8.5-10.1)  mg/dL


 


Magnesium    1.9  (1.8-2.4)  mg/dl


 


Adenovirus (PCR)  Not detected    (Not Detected)  


 


B. pertussis DNA (PCR)  Not detected    (Not Detected)  


 


B.parapertussis DNA PCR  Not detected    (Not Detected)  


 


C. pneumoniae DNA (PCR)  Not detected    (Not Detected)  


 


Coronavirus (PCR)  Not detected    (Not Detected)  


 


Human Metapneumovir PCR  Not detected    (Not Detected)  


 


Influenza A (RT-PCR)  Not detected    (Not Detected)  


 


Influenza B (RT-PCR)  Not detected    (Not Detected)  


 


M. pneumoniae (PCR)  Not detected    (Not Detected)  


 


Parainfluen 1,2,3,4 PCR  Detected H    (Not Detected)  


 


RSV (PCR)  Not detected    (Not Detected)  


 


Entero/Rhino (PCR)  Not detected    (Not Detected)  











FADUMO Results - Last 24 hrs: 


 Microbiology











 12/17/19 08:07 Aerobic Blood Culture - Preliminary





 Blood - Venous    NO GROWTH AFTER 2 DAYS





 Anaerobic Blood Culture - Final


 


 12/17/19 11:23 Aerobic Blood Culture - Preliminary





 Blood - Venous - Lab Draw    NO GROWTH AFTER 2 DAYS





 Anaerobic Blood Culture - Preliminary





    NO GROWTH AFTER 2 DAYS


 


 12/17/19 21:25 Gram Stain - Final





 Sputum - Expectorated Sputum Culture - Preliminary


 


 12/17/19 15:39 Streptococcus pneumoniae Antigen (M - Final





 Urine 











Med Orders - Current: 


 Current Medications





Acetaminophen (Tylenol)  325 mg PO Q6H PRN


   PRN Reason: Pain/Fever


Acetaminophen (Tylenol)  650 mg PO QID [facility]


   Last Admin: 12/19/19 08:29 Dose:  650 mg


Acetylcysteine (Mucomyst 20%)  800 mg NEB QIDRT [facility]


   Last Admin: 12/19/19 09:54 Dose:  Not Given


Albuterol/Ipratropium (Duoneb 3.0-0.5 Mg/3 Ml)  3 ml NEB QIDRT [facility]


   Last Admin: 12/19/19 09:50 Dose:  3 ml


Allopurinol (Zyloprim)  100 mg PO BID [facility]


   Last Admin: 12/19/19 08:28 Dose:  100 mg


Amiodarone HCl (Cordarone)  200 mg PO DAILY [facility]


   Last Admin: 12/19/19 08:32 Dose:  200 mg


Apixaban (Eliquis)  2.5 mg PO BID [facility]


   Last Admin: 12/19/19 08:28 Dose:  2.5 mg


Artificial Tears (Refresh Liquigel 1%)  0 ml EYEBOTH TID [facility]


   Last Admin: 12/19/19 08:34 Dose:  1 drop


Ascorbic Acid (Vitamin C)  500 mg PO BID [facility]


   Last Admin: 12/19/19 08:33 Dose:  500 mg


Benzonatate (Tessalon Perles)  100 mg PO TID PRN


   PRN Reason: Cough


   Last Admin: 12/18/19 05:01 Dose:  100 mg


Bumetanide (Bumex)  1 mg PO QPM [facility]


   Last Admin: 12/18/19 17:57 Dose:  1 mg


Bumetanide (Bumex)  2 mg PO DAILY [facility]


   Last Admin: 12/19/19 08:26 Dose:  2 mg


Famotidine (Pepcid)  20 mg PO DAILY [facility]


   Last Admin: 12/19/19 08:32 Dose:  20 mg


Ferrous Sulfate (Ferrous Sulfate)  324 mg PO BID [facility]


   Last Admin: 12/19/19 09:51 Dose:  324 mg


Guaifenesin (Robitussin)  200 mg PO Q4H PRN


   PRN Reason: Cough


Guaifenesin (Mucinex)  600 mg PO TID [facility]


   Last Admin: 12/19/19 08:32 Dose:  600 mg


Ceftriaxone Sodium 2 gm/ (Sodium Chloride)  100 mls @ 200 mls/hr IV Q24H [facility]


   Last Admin: 12/18/19 11:46 Dose:  200 mls/hr


Doxycycline Hyclate 100 mg/ (Sodium Chloride)  100 mls @ 100 mls/hr IV Q12H [facility]


   Last Admin: 12/19/19 08:18 Dose:  100 mls/hr


Multivitamins (Thera)  1 each PO DAILY [facility]


   Last Admin: 12/19/19 08:27 Dose:  1 each


Niacin Flush Free (500 Mg Cap)  0 mg PO DAILY [facility]


   Last Admin: 12/19/19 08:36 Dose:  500 mg


Ondansetron HCl (Zofran)  4 mg IV Q6H PRN


   PRN Reason: Nausea/Vomiting


Oxycodone HCl (Oxycodone)  5 mg PO Q4H PRN


   PRN Reason: Pain


Oxycodone HCl (Oxycodone)  5 mg PO BID [facility]


   Last Admin: 12/19/19 08:31 Dose:  5 mg


Polyethylene Glycol (Miralax)  17 gm PO DAILY PRN


   PRN Reason: Constipation


Senna/Docusate Sodium (Senna Plus)  1 tab PO BID [facility]


   Last Admin: 12/19/19 08:28 Dose:  1 tab


Simvastatin (Zocor)  20 mg PO BEDTIME [facility]


   Last Admin: 12/18/19 21:03 Dose:  20 mg


Tramadol HCl (Ultram)  50 mg PO BID PRN


   PRN Reason: Pain





Discontinued Medications





Acetaminophen (Tylenol)  650 mg PO NOW ONE


   Stop: 12/17/19 10:38


   Last Admin: 12/17/19 11:13 Dose:  650 mg


Acetaminophen (Tylenol)  650 mg PO Q4H PRN


   PRN Reason: Pain (Mild 1-3)/fever


Albuterol/Ipratropium (Duoneb 3.0-0.5 Mg/3 Ml)  3 ml NEB ONETIME ONE


   Stop: 12/17/19 10:48


   Last Admin: 12/17/19 11:06 Dose:  3 ml


Albuterol/Ipratropium (Duoneb 3.0-0.5 Mg/3 Ml)  3 ml NEB QID [facility]


Guaifenesin (Mucinex)  600 mg PO BID [facility]


   Last Admin: 12/18/19 08:59 Dose:  600 mg


Dextrose/Sodium Chloride (Dextrose 5%-Normal Saline)  1,000 mls @ 100 mls/hr IV 

ASDIRECTED [facility]


   Last Admin: 12/17/19 11:24 Dose:  100 mls/hr


Sodium Chloride (Normal Saline)  500 mls @ 250 mls/hr IV .BOLUS ONE


   Stop: 12/17/19 14:34


   Last Admin: 12/17/19 13:23 Dose:  250 mls/hr


Magnesium Sulfate 2 gm/ Premix  50 mls @ 25 mls/hr IV ONETIME ONE


   Stop: 12/17/19 16:17


   Last Admin: 12/17/19 16:09 Dose:  25 mls/hr











- Exam


Quality Assessment: Reports: DVT Prophylaxis.  Denies: Supplemental Oxygen, 

Urine Catheter


General: Reports: Alert, Oriented, Cooperative, No Acute Distress


HEENT: Reports: Pupils Equal, Pupils Reactive, Mucous Membr. Moist/Pink


Neck: Reports: Supple, Trachea Midline


Lungs: Reports: Normal Respiratory Effort, Crackles (minor at the bases ).  

Denies: Wheezing


Cardiovascular: Reports: Regular Rate, Regular Rhythm


GI/Abdominal Exam: Normal Bowel Sounds, Soft, Non-Tender, No Distention, No 

Abnormal Bruit


 (Female) Exam: Deferred


Rectal (Female) Exam: Deferred


Back Exam: Reports: Normal Inspection, Decreased Range of Motion


Extremities: Normal Inspection, Non-Tender, Normal Capillary Refill, Pedal Edema

, Limited Range of Motion


Skin: Reports: Warm, Dry, Intact


Wound/Incisions: Reports: Dressing Dry and Intact.  Denies: Erythema


Neurological: Reports: No New Focal Deficit


Psy/Mental Status: Reports: Alert, Normal Affect, Normal Mood

## 2019-12-19 NOTE — PCM.PN
- General Info


Date of Service: 12/19/19


Admission Dx/Problem (Free Text): 


Pneumonia 








- Patient Data


Vitals - Most Recent: 


 Last Vital Signs











Temp  98.2 F   12/19/19 03:08


 


Pulse  85   12/19/19 03:08


 


Resp  18   12/19/19 03:08


 


BP  143/69 H  12/19/19 03:08


 


Pulse Ox  100   12/19/19 06:02











Weight - Most Recent: 140 lb 14.4 oz


I&O - Last 24 Hours: 


 Intake & Output











 12/18/19 12/19/19 12/19/19





 22:59 06:59 14:59


 


Intake Total 1420 800 


 


Output Total 300 550 


 


Balance 1120 250 











Lab Results Last 24 Hours: 


 Laboratory Results - last 24 hr











  12/17/19 12/19/19 12/19/19 Range/Units





  16:07 05:10 05:10 


 


WBC   11.49 H   (3.98-10.04)  K/mm3


 


RBC   3.35 L   (3.98-5.22)  M/mm3


 


Hgb   10.5 L   (11.2-15.7)  gm/dl


 


Hct   33.5 L   (34.1-44.9)  %


 


MCV   100.0 H   (79.4-94.8)  fl


 


MCH   31.3   (25.6-32.2)  pg


 


MCHC   31.3 L   (32.2-35.5)  g/dl


 


RDW Std Deviation   53.4 H   (36.4-46.3)  fL


 


Plt Count   242   (182-369)  K/mm3


 


MPV   10.2   (9.4-12.3)  fl


 


Neut % (Auto)   84.1 H   (34.0-71.1)  %


 


Lymph % (Auto)   8.7 L   (19.3-51.7)  %


 


Mono % (Auto)   6.3   (4.7-12.5)  %


 


Eos % (Auto)   0.1 L   (0.7-5.8)  


 


Baso % (Auto)   0.1   (0.1-1.2)  %


 


Neut # (Auto)   9.67 H   (1.56-6.13)  K/mm3


 


Lymph # (Auto)   1.00 L   (1.18-3.74)  K/mm3


 


Mono # (Auto)   0.72 H   (0.24-0.36)  K/mm3


 


Eos # (Auto)   0.01 L   (0.04-0.36)  K/mm3


 


Baso # (Auto)   0.01   (0.01-0.08)  K/mm3


 


Manual Slide Review   Abnormal smear   


 


Sodium    138  (136-145)  mEq/L


 


Potassium    3.6  (3.5-5.1)  mEq/L


 


Chloride    101  ()  mEq/L


 


Carbon Dioxide    27  (21-32)  mEq/L


 


Anion Gap    13.6  (5-15)  


 


BUN    55 H  (7-18)  mg/dL


 


Creatinine    1.7 H  (0.55-1.02)  mg/dL


 


Est Cr Clr Drug Dosing    20.38  mL/min


 


Estimated GFR (MDRD)    29  (>60)  mL/min


 


BUN/Creatinine Ratio    32.4 H  (14-18)  


 


Glucose    129 H  ()  mg/dL


 


Calcium    9.1  (8.5-10.1)  mg/dL


 


Magnesium    1.9  (1.8-2.4)  mg/dl


 


Adenovirus (PCR)  Not detected    (Not Detected)  


 


B. pertussis DNA (PCR)  Not detected    (Not Detected)  


 


B.parapertussis DNA PCR  Not detected    (Not Detected)  


 


C. pneumoniae DNA (PCR)  Not detected    (Not Detected)  


 


Coronavirus (PCR)  Not detected    (Not Detected)  


 


Human Metapneumovir PCR  Not detected    (Not Detected)  


 


Influenza A (RT-PCR)  Not detected    (Not Detected)  


 


Influenza B (RT-PCR)  Not detected    (Not Detected)  


 


M. pneumoniae (PCR)  Not detected    (Not Detected)  


 


Parainfluen 1,2,3,4 PCR  Detected H    (Not Detected)  


 


RSV (PCR)  Not detected    (Not Detected)  


 


Entero/Rhino (PCR)  Not detected    (Not Detected)  











Janes Results Last 24 Hours: 


 Microbiology











 12/17/19 08:07 Aerobic Blood Culture - Preliminary





 Blood - Venous    NO GROWTH AFTER 1 DAY





 Anaerobic Blood Culture - Final


 


 12/17/19 11:23 Aerobic Blood Culture - Preliminary





 Blood - Venous - Lab Draw    NO GROWTH AFTER 1 DAY





 Anaerobic Blood Culture - Preliminary





    NO GROWTH AFTER 1 DAY


 


 12/17/19 21:25 Gram Stain - Final





 Sputum - Expectorated Sputum Culture - Preliminary











Med Orders - Current: 


 Current Medications





Acetaminophen (Tylenol)  325 mg PO Q6H PRN


   PRN Reason: Pain/Fever


Acetaminophen (Tylenol)  650 mg PO QID [facility]


   Last Admin: 12/18/19 21:02 Dose:  650 mg


Acetylcysteine (Mucomyst 20%)  800 mg NEB QIDRT [facility]


   Last Admin: 12/19/19 06:00 Dose:  800 mg


Albuterol/Ipratropium (Duoneb 3.0-0.5 Mg/3 Ml)  3 ml NEB QIDRT [facility]


   Last Admin: 12/19/19 05:59 Dose:  3 ml


Allopurinol (Zyloprim)  100 mg PO BID [facility]


   Last Admin: 12/18/19 21:03 Dose:  100 mg


Amiodarone HCl (Cordarone)  200 mg PO DAILY [facility]


   Last Admin: 12/18/19 08:54 Dose:  200 mg


Apixaban (Eliquis)  2.5 mg PO BID [facility]


   Last Admin: 12/18/19 21:02 Dose:  2.5 mg


Artificial Tears (Refresh Liquigel 1%)  0 ml EYEBOTH TID [facility]


   Last Admin: 12/18/19 21:04 Dose:  1 drop


Ascorbic Acid (Vitamin C)  500 mg PO BID [facility]


   Last Admin: 12/18/19 21:03 Dose:  500 mg


Benzonatate (Tessalon Perles)  100 mg PO TID PRN


   PRN Reason: Cough


   Last Admin: 12/18/19 05:01 Dose:  100 mg


Bumetanide (Bumex)  1 mg PO QPM [facility]


   Last Admin: 12/18/19 17:57 Dose:  1 mg


Bumetanide (Bumex)  2 mg PO DAILY [facility]


   Last Admin: 12/18/19 08:52 Dose:  2 mg


Famotidine (Pepcid)  20 mg PO DAILY [facility]


   Last Admin: 12/18/19 08:56 Dose:  20 mg


Ferrous Sulfate (Ferrous Sulfate)  324 mg PO BID [facility]


   Last Admin: 12/18/19 21:03 Dose:  324 mg


Guaifenesin (Robitussin)  200 mg PO Q4H PRN


   PRN Reason: Cough


Guaifenesin (Mucinex)  600 mg PO TID [facility]


   Last Admin: 12/18/19 21:03 Dose:  600 mg


Ceftriaxone Sodium 2 gm/ (Sodium Chloride)  100 mls @ 200 mls/hr IV Q24H [facility]


   Last Admin: 12/18/19 11:46 Dose:  200 mls/hr


Doxycycline Hyclate 100 mg/ (Sodium Chloride)  100 mls @ 100 mls/hr IV Q12H [facility]


   Last Admin: 12/18/19 21:03 Dose:  100 mls/hr


Multivitamins (Thera)  1 each PO DAILY [facility]


   Last Admin: 12/18/19 08:51 Dose:  1 each


Niacin Flush Free (500 Mg Cap)  0 mg PO DAILY [facility]


   Last Admin: 12/18/19 08:49 Dose:  1 mg


Ondansetron HCl (Zofran)  4 mg IV Q6H PRN


   PRN Reason: Nausea/Vomiting


Oxycodone HCl (Oxycodone)  5 mg PO Q4H PRN


   PRN Reason: Pain


Oxycodone HCl (Oxycodone)  5 mg PO BID [facility]


   Last Admin: 12/18/19 21:02 Dose:  5 mg


Polyethylene Glycol (Miralax)  17 gm PO DAILY PRN


   PRN Reason: Constipation


Senna/Docusate Sodium (Senna Plus)  1 tab PO BID [facility]


   Last Admin: 12/18/19 21:03 Dose:  1 tab


Simvastatin (Zocor)  20 mg PO BEDTIME [facility]


   Last Admin: 12/18/19 21:03 Dose:  20 mg


Tramadol HCl (Ultram)  50 mg PO BID PRN


   PRN Reason: Pain





Discontinued Medications





Acetaminophen (Tylenol)  650 mg PO NOW ONE


   Stop: 12/17/19 10:38


   Last Admin: 12/17/19 11:13 Dose:  650 mg


Acetaminophen (Tylenol)  650 mg PO Q4H PRN


   PRN Reason: Pain (Mild 1-3)/fever


Albuterol/Ipratropium (Duoneb 3.0-0.5 Mg/3 Ml)  3 ml NEB ONETIME ONE


   Stop: 12/17/19 10:48


   Last Admin: 12/17/19 11:06 Dose:  3 ml


Albuterol/Ipratropium (Duoneb 3.0-0.5 Mg/3 Ml)  3 ml NEB QID [facility]


Guaifenesin (Mucinex)  600 mg PO BID [facility]


   Last Admin: 12/18/19 08:59 Dose:  600 mg


Dextrose/Sodium Chloride (Dextrose 5%-Normal Saline)  1,000 mls @ 100 mls/hr IV 

ASDIRECTED [facility]


   Last Admin: 12/17/19 11:24 Dose:  100 mls/hr


Sodium Chloride (Normal Saline)  500 mls @ 250 mls/hr IV .BOLUS ONE


   Stop: 12/17/19 14:34


   Last Admin: 12/17/19 13:23 Dose:  250 mls/hr


Magnesium Sulfate 2 gm/ Premix  50 mls @ 25 mls/hr IV ONETIME ONE


   Stop: 12/17/19 16:17


   Last Admin: 12/17/19 16:09 Dose:  25 mls/hr











Sepsis Event Note





- Evaluation


Sepsis Screening Result: No Definite Risk





- Focused Exam


Vital Signs: 


 Vital Signs











  Temp Pulse Resp BP Pulse Ox Pulse Ox


 


 12/19/19 06:02       100


 


 12/19/19 03:08  98.2 F  85  18  143/69 H  99 


 


 12/18/19 20:29       100


 


 12/18/19 19:51   91    99 


 


 12/18/19 19:17  98.2 F  83  17  139/76  98 











Date Exam was Performed: 12/19/19


Time Exam was Performed: 07:10





- Problem List & Annotations


(1) Volume depletion


SNOMED Code(s): 00680228


   Code(s): E86.9 - VOLUME DEPLETION, UNSPECIFIED   Status: Acute   Priority: 

High   Current Visit: Yes   





(2) Acute febrile illness


SNOMED Code(s): 697432008


   Code(s): R50.9 - FEVER, UNSPECIFIED   Status: Acute   Priority: High   

Current Visit: Yes   





(3) Chronic renal insufficiency, stage IV (severe)


SNOMED Code(s): 382356146


   Code(s): N18.4 - CHRONIC KIDNEY DISEASE, STAGE 4 (SEVERE)   Status: Chronic 

  Priority: Medium   Current Visit: Yes   





(4) Pneumonia


SNOMED Code(s): 788697214


   Code(s): J18.9 - PNEUMONIA, UNSPECIFIED ORGANISM   Status: Acute   Priority: 

High   Current Visit: Yes   


Qualifiers: 


   Pneumonia type: due to Mycoplasma pneumoniae   Laterality: unspecified 

laterality   Lung location: unspecified part of lung   Qualified Code(s): J15.7 

- Pneumonia due to Mycoplasma pneumoniae   





(5) Bilateral knee pain


SNOMED Code(s): 71273386


   Code(s): M25.561 - PAIN IN RIGHT KNEE; M25.562 - PAIN IN LEFT KNEE   Status: 

Chronic   Priority: Medium   Current Visit: No   


Qualifiers: 


   Chronicity: acute   Qualified Code(s): M25.561 - Pain in right knee; M25.562 

- Pain in left knee   





(6) Facial contusion


SNOMED Code(s): 818255108


   Code(s): S00.83XA - CONTUSION OF OTHER PART OF HEAD, INITIAL ENCOUNTER   

Status: Chronic   Priority: Medium   Current Visit: No   


Qualifiers: 


   Encounter type: initial encounter   Qualified Code(s): S00.83XA - Contusion 

of other part of head, initial encounter   





(7) Laceration of left forearm


SNOMED Code(s): 02940006120696132


   Code(s): S51.812A - LACERATION WITHOUT FOREIGN BODY OF LEFT FOREARM, INIT 

ENCNTR   Status: Chronic   Priority: Medium   Current Visit: No   


Qualifiers: 


   Encounter type: subsequent encounter   Qualified Code(s): S51.812D - 

Laceration without foreign body of left forearm, subsequent encounter   





(8) Paroxysmal atrial fibrillation


SNOMED Code(s): 524466017


   Code(s): I48.0 - PAROXYSMAL ATRIAL FIBRILLATION   Status: Chronic   Priority

: Low   Current Visit: No   





(9) Peripheral edema


SNOMED Code(s): 806137607


   Code(s): R60.9 - EDEMA, UNSPECIFIED   Status: Chronic   Priority: Medium   

Current Visit: No   





(10) Hypomagnesemia


SNOMED Code(s): 786568294


   Code(s): E83.42 - HYPOMAGNESEMIA   Status: Acute   Priority: High   Current 

Visit: Yes   





- My Orders


Last 24 Hours: 


My Active Orders





12/18/19 07:22


Acetaminophen [Tylenol]   325 mg PO Q6H PRN 


Polyethylene Glycol 3350 [MiraLAX]   17 gm PO DAILY PRN 


oxyCODONE   5 mg PO Q4H PRN 


traMADol [Ultram]   50 mg PO BID PRN 





12/18/19 08:00


Doxycycline [Vibramycin] 100 mg   Sodium Chloride 0.9% [Normal Saline] 100 ml 

IV Q12H 





12/18/19 09:00


Acetaminophen [Tylenol]   650 mg PO QID 


Amiodarone [Cordarone]   200 mg PO DAILY 


Apixaban [Eliquis]   2.5 mg PO BID 


Ascorbic Acid [Vitamin C]   500 mg PO BID 


Bumetanide [Bumex]   2 mg PO DAILY 


Docusate Sodium/Sennosides [Senna Plus]   1 tab PO BID 


Famotidine [Pepcid]   20 mg PO DAILY 


Ferrous Sulfate   324 mg PO BID 


Multivitamins,Therapeutic [Thera]   1 each PO DAILY 


Niacin (Inositol Niacinate) [Niacin Flush Free 500 mg Cap]   0 mg PO DAILY 


allopurinoL [Zyloprim]   100 mg PO BID 


oxyCODONE   5 mg PO BID 





12/18/19 18:00


Bumetanide [Bumex]   1 mg PO QPM 





12/18/19 21:00


Simvastatin [Zocor]   20 mg PO BEDTIME 














- Plan


Plan:: 


I/P:





Acute:





Mycoplasma Pneumonia


   -Resides at SNF


   -Reports symptoms for past few weeks, SNF staff reports symptoms for past 48 

hrs


   -Congested productive cough


   -Hx/o recurrent PNA


   -No fever in ED or on floor


   -WBC 11.67-->10.03


   -CRP 3.9


   -Lactic acid 1.2


   -Started on Rocephin in ED - continue


   -Start Doxycycline 100mg BID


   -500mL fluid bolus given in ED


   -Tylenol for fever


   -Blood cultures negative


   -IV fluids as ordered


   -Mycoplasma POSITIVE 


   -Strep pneumonia, viral panel - pending


   -Sputum culture pending


   -Negative influenza


   -IS/Acapella/RT


   -Duonebs as ordered


   -Droplet isolation


   -Consider Repeat CXR in 24-48 hrs if indicated 


   


Volume depletion, resolved 


   -Clinically dry


   -Anion gap 15.4--> 14.1


   -Reports poor oral intake last 24-48 hours


   -IV fluids as ordered -> discontinue 





Chronic kidney failure, Stage IV, stable


   -BUN 73-->63


   -Creatinine 1.7-->1.6


   -GFR of 29-->31


   -Baseline creatinine appears to be 1.7-2.2; GFR of 24-31


   -Stable


   -Avoid nephrotoxic agents if possible


   -IV fluids as above





Hypomagnesemia, Resolved 


   -Magnesium 1.7-->2.4


   -Supplemented





Pedal edema


   -Chronic bilateral pedal edema


   -Hx/o lymphedema


   -Home Velcro compression stockings


   -Elevation  


   -Continue home Bumex


   -Caution with IV hydration





Chronic pain


   -Reports chronic bilateral shoulder, bilateral knee, bilateral hip, lower 

back, and neck pain


   -Home pain medications as ordered


   -PT/OT


   -Hx/o gout - allopurinol 


   -ESR 54, CRP 3.9





Chronic:


A. fib on eliquis 


HLD


hypertension


syncope


recurrent pneumonia


GERD


arthritis


vertigo


obesity


lymphedema


basal cell carcinoma


breast cancer





Plan:


Admit to medical floor on telemetry 


Home medications as ordered


Routine AM labs


CM/SW for discharge planning: resident of [facility]


Other orders as indicated above


DVT Prophylaxis: Home Eliquis, Home compression stockings; GI Prophylaxis: Not 

indicated


Code status: DNR; PCP: Dr. Horta

## 2020-05-10 NOTE — EDM.PDOC
ED HPI GENERAL MEDICAL PROBLEM





- General


Chief Complaint: Gastrointestinal Problem


Stated Complaint: CONSTIPATION


Time Seen by Provider: 05/10/20 19:31


Source of Information: Reports: Patient


History Limitations: Reports: Physical Impairment (Hard of hearing despite left 

hearing aid. Poor historian.)





- History of Present Illness


INITIAL COMMENTS - FREE TEXT/NARRATIVE: 





Mrs. Oakes is a very pleasant 85-year-old woman with a past medical history 

significant for paroxysmal atrial fibrillation, renal insufficiency, right 

breast cancer status post lumpectomy, and chronic lower extremity edema, who is 

now sent from [facility] nursing home with a report of constipation, no 

BM x4 days and bulging to vaginal opening.  The patient states that she was 

given 2 rectal suppositories and a soapsuds enema this afternoon, with good 

stool output, however, while she no longer feels any rectal urgency, she doubts 

that she evacuated adequately.  She reported nausea to the triage nurse, 

although denies it to me.  She does indicate that she has some generalized 

abdominal discomfort.  The patient states that she has a long history of 

constipation.  With respect to the bulge in the vagina, the patient does not 

know anything about that.  She denies recent fever, chills, sore throat, ear 

pain, nasal or sinus congestion, cough, dyspnea, chest pain, palpitations, 

nausea, vomiting, diarrhea, urinary symptoms, recent weight gain or weight loss

, recent bloody bowel movements or black bowel movements, recent joint aches, 

headaches, or rashes.





Here in the ED, the patient is found to be hemodynamically stable, afebrile, 

saturating 98% on room air.








The patient's PCP is Dr. Pravin Horta.











- Related Data


 Allergies











Allergy/AdvReac Type Severity Reaction Status Date / Time


 


codeine Allergy Severe Rash Verified 05/10/20 19:38


 


iodine Allergy Severe Rash Verified 05/10/20 19:38


 


metoprolol [From Toprol XL] Allergy Severe swelling Verified 05/10/20 19:38





   of lips  


 


naproxen [From Naprelan] Allergy Severe Rash Verified 05/10/20 19:38


 


prednisone Allergy Severe Other Verified 05/10/20 19:38


 


propoxyphene AdvReac Severe urine Verified 05/10/20 19:38





   retention  











Home Meds: 


 Home Meds





Acetaminophen [Tylenol] 325 mg PO Q6HR PRN 12/23/18 [History]


Bumetanide [Bumex] 2 mg PO DAILY 12/23/18 [History]


Loratadine 10 mg PO DAILY PRN 12/23/18 [History]


Multivitamin [Multi-Vitamin Daily] 1 tab PO DAILY 12/23/18 [History]


Simvastatin 20 mg PO BEDTIME 12/23/18 [History]


allopurinoL [Zyloprim] 100 mg PO BID 12/23/18 [History]


traMADol [Ultram] 50 mg PO BID PRN 12/23/18 [History]


Amiodarone [Cordarone] 200 mg PO DAILY #30 tab 12/27/18 [Rx]


Apixaban [Eliquis] 2.5 mg PO BID #30 12/27/18 [Rx]


Acetaminophen [Tylenol] 650 mg PO QID 04/15/19 [History]


Famotidine [Pepcid] 20 mg PO DAILY 04/15/19 [History]


Sennosides/Docusate Sodium [Senna Plus Tablet] 1 tab PO BID PRN 04/15/19 [

History]


Bumetanide 1 mg PO QPM 12/17/19 [History]


Niacin (Inositol Niacinate) [Niacin Flush Free 500 mg Cap] 400 mg PO DAILY 12/17 /19 [History]


Polyvinyl Alcohol/Povidone/Pf [Refresh Classic Eye Drops] 1 each EYEBOTH TID 12/ 17/19 [History]


Potassium Chloride 10 meq PO DAILY 12/17/19 [History]


Sennosides/Docusate Sodium [Senna Plus 8.6-50 mg Tablet] 1 tab PO BID 12/17/19 [

History]


guaiFENesin [Mucinex] 600 mg PO BID 12/17/19 [History]


oxyCODONE 5 mg PO BID 12/17/19 [History]


oxyCODONE 5 mg PO Q4HR PRN 12/17/19 [History]


polyethylene glycoL 3350 [MiraLAX] 17 gm PO DAILY PRN 12/17/19 [History]


Bisacodyl [Laxative Suppository] 10 mg RC ONETIME PRN 05/10/20 [History]


Ipratropium/Albuterol Sulfate [Iprat-Albut 0.5-3(2.5) MG/3 ML] 3 ml IH QID PRN 

05/10/20 [History]


Iron Ag,Ps/C/Fa6/B12/Zn/SA/Sto [Niferex Tablet] 1 each PO BID 05/10/20 [History]


Spironolactone [Aldactone] 25 mg PO DAILY 05/10/20 [History]











Past Medical History


HEENT History: Reports: Hard of Hearing, Impaired Vision, Other (See Below)


Other HEENT History: hearing aid to left ear, wears glasses for reading


Cardiovascular History: Reports: Afib (paroxysmal), High Cholesterol, 

Hypertension


Gastrointestinal History: Reports: GERD


Genitourinary History: Reports: Chronic Renal Insuffiency


Musculoskeletal History: Reports: Gout, Osteoarthritis


Oncologic (Cancer) History: Reports: Breast (right, s/p lumpectomy + RTx)





- Infectious Disease History


Infectious Disease History: Reports: Chicken Pox, Measles, Shingles





- Past Surgical History


HEENT Surgical History: Reports: Cataract Surgery (bilateral)


Oncologic Surgical History: Reports: Biopsy of Breast (right), Lumpectomy (right

)





Social & Family History





- Family History


Family Medical History: Noncontributory





- Tobacco Use


Smoking Status *Q: Never Smoker


Second Hand Smoke Exposure: No





- Caffeine Use


Caffeine Use: Reports: None





- Alcohol Use


Alcohol Use History: No





- Recreational Drug Use


Recreational Drug Use: No





- Living Situation & Occupation


Living situation: Reports: , Extended Care Facility ([facility])


Occupation: Retired





ED ROS GENERAL





- Review of Systems


Review Of Systems: Comprehensive ROS is negative, except as noted in HPI.





ED EXAM, GI/ABD





- Physical Exam


Exam: See Below


Exam Limited By: No Limitations


General Appearance: Alert, WD/WN, No Apparent Distress


Eyes: Bilateral: Normal Appearance, EOMI


Ears: Normal External Exam, Hearing Loss


Nose: Normal Inspection


Throat/Mouth: Normal Inspection, Normal Lips, Normal Teeth, Normal Gums, Normal 

Oropharynx, Normal Voice, No Airway Compromise


Head: Atraumatic, Normocephalic


Neck: Normal Inspection, Supple, Non-Tender, Full Range of Motion


Respiratory/Chest: No Respiratory Distress, Lungs Clear, Normal Breath Sounds, 

No Accessory Muscle Use, Chest Non-Tender


Cardiovascular: Normal Peripheral Pulses, Regular Rate, Rhythm, No Edema, No 

Gallop, No JVD, No Murmur, No Rub


GI/Abdominal Exam: Normal Bowel Sounds, Soft, No Organomegaly, No Distention, 

No Abnormal Bruit, No Mass, Tender (mild, generalized, non-focal)


 (Female) Exam: Normal External Exam, Other (Possible non-protruding rectocele

)


Rectal (Female) Exam: Deferred


Back Exam: Normal Inspection, Full Range of Motion, NT


Extremities: Normal Capillary Refill, Other (4+ pretibial pitting edema 

bilaterally)


Neurological: Alert, Oriented, Normal Cognition, No Motor/Sensory Deficits


Psychiatric: Normal Affect


Skin Exam: Warm, Dry, Intact, Normal Color, No Rash





Course





- Vital Signs


Last Recorded V/S: 


 Last Vital Signs











Temp  36.5 C   05/10/20 19:35


 


Pulse  88   05/10/20 19:35


 


Resp  16   05/10/20 19:35


 


BP  140/63   05/10/20 19:35


 


Pulse Ox  98   05/10/20 19:35














- Orders/Labs/Meds


Orders: 


 Active Orders 24 hr











 Category Date Time Status


 


 Abdomen 1V Upright [CR] Stat Exams  05/10/20 19:53 Taken














- Re-Assessments/Exams


Free Text/Narrative Re-Assessment/Exam: 





05/10/20 19:54


As above, the patient is sent from [facility] nursing home for possible 

constipation.  She apparently had good stool output following 2 rectal 

suppositories and a soapsuds enema, she states that she does not currently have 

rectal urgency, however, she also states that she does not feel that she had 

adequate output.  I have ordered an upright abdominal x-ray to evaluate.








05/10/20 20:41


Single view upright abdomen radiograph appears to demonstrate a modest amount 

of stool throughout the colon, but no stool noted in the rectum.  Pelvic 

phleboliths are noted.  Chondral cartilage calcification noted.  Osteopenia 

noted.  Formal read per the Radiologist pending.








05/10/20 20:58


X-ray results discussed with the patient.  She states that she feels well 

enough to return back to [facility].





Departure





- Departure


Time of Disposition: 20:59


Disposition: Home, Self-Care 01


Condition: Good


Clinical Impression: 


 Constipation








- Discharge Information


*PRESCRIPTION DRUG MONITORING PROGRAM REVIEWED*: Not Applicable


*COPY OF PRESCRIPTION DRUG MONITORING REPORT IN PATIENT CLAY: Not Applicable


Instructions:  Constipation, Adult, Easy-to-Read


Referrals: 


Pravin Horta MD [Primary Care Provider] - 


Forms:  ED Department Discharge


Additional Instructions: 


Mrs. Oakes was seen in the emergency room over concern of constipation, as 

well as for a bulge seen in her vagina.





Work-up in the ER included an x-ray of her abdomen, which showed a modest 

amount of stool, but no significant constipation.





On vaginal examination, she may have a rectocele.





She may continue her usual medications as prescribed.





If any other problems, please do not hesitate to return Mrs. Oakes to the ER.





Sepsis Event Note





- Evaluation


Sepsis Screening Result: No Definite Risk





- Focused Exam


Vital Signs: 


 Vital Signs











  Temp Pulse Resp BP Pulse Ox


 


 05/10/20 19:35  36.5 C  88  16  140/63  98











Date Exam was Performed: 05/10/20


Time Exam was Performed: 22:02





- My Orders


Last 24 Hours: 


My Active Orders





05/10/20 19:53


Abdomen 1V Upright [CR] Stat 














- Assessment/Plan


Last 24 Hours: 


My Active Orders





05/10/20 19:53


Abdomen 1V Upright [CR] Stat

## 2020-05-11 NOTE — CR
Abdomen: Upright view of the abdomen was obtained.

 

Comparison: No prior abdominal imaging.

 

2 calcified gallstones seen within the upper right abdomen.  Bowel gas

 pattern is within normal limits.  No free air is seen.  Bony 

structures are osteopenic.

 

Impression:

1.  Calcified gallstones within the upper right abdomen.

2.  Nothing acute is otherwise seen on the upright abdominal x-ray.

 

Diagnostic code #2

 

This report was dictated in MDT

## 2020-05-15 NOTE — EDM.PDOC
ED HPI GENERAL MEDICAL PROBLEM





- General


Chief Complaint: Abdominal Pain


Stated Complaint: CONSTIPATION


Time Seen by Provider: 05/15/20 10:40


Source of Information: Reports: Patient, Nursing Home Records


History Limitations: Reports: No Limitations





- History of Present Illness


INITIAL COMMENTS - FREE TEXT/NARRATIVE: 





The patient presents with right upper abdominal pain that radiates to her back.

  This started earlier today after eating a [employer] sandwich.  She has no 

fever, chills, cough, chest pain, shortness of breath, nausea or vomiting.  She 

was seen here 5 days ago for constipation.  She was doing good until this 

morning.  On x-ray 5 days ago there were 2 large gallstones.  She has no 

history of gallbladder problems.  She does have bilateral lower leg edema with 

some weeping from the right leg.


Onset: Sudden


Duration: Hour(s):


Location: Reports: Abdomen, Back


Quality: Reports: Sharp


Severity: Moderate


Improves with: Reports: None


Worsens with: Reports: None


Associated Symptoms: Denies: Chest Pain, Cough, Fever/Chills, Headaches, Nausea/

Vomiting, Shortness of Breath


  ** Abdominal


Pain Score (Numeric/FACES): 8





- Related Data


 Allergies











Allergy/AdvReac Type Severity Reaction Status Date / Time


 


codeine Allergy Severe Rash Verified 05/15/20 10:41


 


iodine Allergy Severe Rash Verified 05/15/20 10:41


 


metoprolol [From Toprol XL] Allergy Severe swelling Verified 05/15/20 10:41





   of lips  


 


naproxen [From Naprelan] Allergy Severe Rash Verified 05/15/20 10:41


 


prednisone Allergy Severe Other Verified 05/15/20 10:41


 


propoxyphene AdvReac Severe urine Verified 05/15/20 10:41





   retention  











Home Meds: 


 Home Meds





Acetaminophen [Tylenol] 325 mg PO Q6HR PRN 12/23/18 [History]


Bumetanide [Bumex] 2 mg PO DAILY 12/23/18 [History]


Loratadine 10 mg PO DAILY PRN 12/23/18 [History]


Multivitamin [Multi-Vitamin Daily] 1 tab PO DAILY 12/23/18 [History]


Simvastatin 20 mg PO BEDTIME 12/23/18 [History]


allopurinoL [Zyloprim] 100 mg PO BID 12/23/18 [History]


traMADol [Ultram] 50 mg PO BID PRN 12/23/18 [History]


Amiodarone [Cordarone] 200 mg PO DAILY #30 tab 12/27/18 [Rx]


Apixaban [Eliquis] 2.5 mg PO BID #30 12/27/18 [Rx]


Acetaminophen [Tylenol] 650 mg PO QID 04/15/19 [History]


Famotidine [Pepcid] 20 mg PO DAILY 04/15/19 [History]


Sennosides/Docusate Sodium [Senna Plus Tablet] 1 tab PO BID PRN 04/15/19 [

History]


Bumetanide 1 mg PO QPM 12/17/19 [History]


Niacin (Inositol Niacinate) [Niacin Flush Free 500 mg Cap] 400 mg PO DAILY 12/17 /19 [History]


Polyvinyl Alcohol/Povidone/Pf [Refresh Classic Eye Drops] 1 each EYEBOTH TID 12/ 17/19 [History]


Potassium Chloride 10 meq PO DAILY 12/17/19 [History]


Sennosides/Docusate Sodium [Senna Plus 8.6-50 mg Tablet] 1 tab PO BID 12/17/19 [

History]


guaiFENesin [Mucinex] 600 mg PO BID 12/17/19 [History]


oxyCODONE 5 mg PO BID 12/17/19 [History]


oxyCODONE 5 mg PO Q4HR PRN 12/17/19 [History]


polyethylene glycoL 3350 [MiraLAX] 17 gm PO DAILY PRN 12/17/19 [History]


Bisacodyl [Laxative Suppository] 10 mg RC ONETIME PRN 05/10/20 [History]


Ipratropium/Albuterol Sulfate [Iprat-Albut 0.5-3(2.5) MG/3 ML] 3 ml IH QID PRN 

05/10/20 [History]


Iron Ag,Ps/C/Fa6/B12/Zn/SA/Sto [Niferex Tablet] 1 each PO BID 05/10/20 [History]


Spironolactone [Aldactone] 25 mg PO DAILY 05/10/20 [History]











Past Medical History


HEENT History: Reports: Hard of Hearing, Impaired Vision, Other (See Below)


Other HEENT History: hearing aid to left ear, wears glasses for reading


Cardiovascular History: Reports: Afib, High Cholesterol, Hypertension


Other Cardiovascular History: edema,


Respiratory History: Reports: Pneumonia, Recurrent


Gastrointestinal History: Reports: GERD


Other Gastrointestinal History: nutritional deficiency, dysphagia,


Genitourinary History: Reports: Chronic Renal Insuffiency


OB/GYN History: Reports: Pregnancy


Musculoskeletal History: Reports: Gout, Osteoarthritis


Other Musculoskeletal History: Chronic bilateral shoulder and back pain, 

chronic bilateral knee pain, multiple rib fx's L side, muscle weakness


Neurological History: Reports: None, Vertigo


Other Neuro History: dizziness


Other Psychiatric History: "Poisoning by cardiac-stimulant glycosides and drugs 

of similar action, accidental, subsequent encounter-digoxin toxicity"


Endocrine/Metabolic History: Reports: Hypokalemia, Hypomagnesemia, Obesity/BMI 

30+


Other Endocrine/Metabolic History: gout, lymphedema


Hematologic History: Reports: Anemia, Other (See Below)


Other Hematologic History: lymphodema


Immunologic History: Reports: None


Oncologic (Cancer) History: Reports: Breast


Other Oncologic History: potential breast cancer, recent diagnossis.  breast 

cancer and is to start radiation


Dermatologic History: Reports: Other (See Below)


Other Dermatologic History: cellulitis to leg right, wound to L arm,





- Infectious Disease History


Infectious Disease History: Reports: Chicken Pox, Measles, Shingles





- Past Surgical History


HEENT Surgical History: Reports: Cataract Surgery


Respiratory Surgical History: Reports: None


Endocrine Surgical History: Reports: None


Neurological Surgical History: Reports: None


Oncologic Surgical History: Reports: Biopsy of Breast, Lumpectomy





Social & Family History





- Family History


Family Medical History: Noncontributory





- Tobacco Use


Smoking Status *Q: Never Smoker


Second Hand Smoke Exposure: No





- Caffeine Use


Caffeine Use: Reports: None





- Living Situation & Occupation


Living situation: Reports: , Extended Care Facility ([facility])


Occupation: Retired





ED ROS GENERAL





- Review of Systems


Review Of Systems: See Below


Constitutional: Reports: No Symptoms


HEENT: Reports: No Symptoms


Respiratory: Reports: No Symptoms


Cardiovascular: Reports: No Symptoms


Endocrine: Reports: No Symptoms


GI/Abdominal: Reports: Abdominal Pain.  Denies: Diarrhea, Nausea, Vomiting


: Reports: No Symptoms


Musculoskeletal: Reports: Back Pain (Right mid back)





ED EXAM, GI/ABD





- Physical Exam


Exam: See Below


Exam Limited By: No Limitations


General Appearance: Alert, No Apparent Distress


Ears: Normal External Exam


Nose: Normal Inspection


Head: Atraumatic, Normocephalic


Neck: Normal Inspection


Respiratory/Chest: No Respiratory Distress, Lungs Clear, Normal Breath Sounds


Cardiovascular: Regular Rate, Rhythm, No Edema, No Murmur


GI/Abdominal Exam: Soft, No Organomegaly, No Mass, Tender (Moderate tenderness 

to the RUQ)


Extremities: Other (Moderate to severe edema to both lower legs with some 

weeping from the right leg)





Course





- Vital Signs


Last Recorded V/S: 


 Last Vital Signs











Temp  98.2 F   05/15/20 10:41


 


Pulse  86   05/15/20 13:11


 


Resp  14   05/15/20 13:11


 


BP  120/56 L  05/15/20 13:11


 


Pulse Ox  100   05/15/20 13:11














- Orders/Labs/Meds


Orders: 


 Active Orders 24 hr











 Category Date Time Status


 


 EKG Documentation Completion [RC] ASDIRECTED Care  05/15/20 11:48 Active


 


 Peripheral IV Care [RC] .AS DIRECTED Care  05/15/20 10:48 Active


 


 MAGNESIUM [CHEM] Stat Lab  05/15/20 13:15 Ordered


 


 PHOSPHORUS [CHEM] Stat Lab  05/15/20 13:15 Ordered


 


 UA W/MICROSCOPIC [URIN] Stat Lab  05/15/20 12:10 Results


 


 Sodium Chloride 0.9% [Saline Flush] Med  05/15/20 10:47 Active





 10 ml FLUSH ASDIRECTED PRN   


 


 Peripheral IV Insertion Adult [OM.PC] Stat Oth  05/15/20 10:47 Ordered


 


 EKG 12 Lead [EK] Stat Ther  05/15/20 11:48 Ordered








 Medication Orders





Sodium Chloride (Saline Flush)  10 ml FLUSH ASDIRECTED PRN


   PRN Reason: Keep Vein Open


   Last Admin: 05/15/20 11:26  Dose: 10 ml








Labs: 


 Laboratory Tests











  05/15/20 05/15/20 05/15/20 Range/Units





  11:00 11:00 11:00 


 


WBC  15.55 H    (3.98-10.04)  K/mm3


 


RBC  3.35 L    (3.98-5.22)  M/mm3


 


Hgb  11.1 L    (11.2-15.7)  gm/dl


 


Hct  34.3    (34.1-44.9)  %


 


MCV  102.4 H    (79.4-94.8)  fl


 


MCH  33.1 H    (25.6-32.2)  pg


 


MCHC  32.4    (32.2-35.5)  g/dl


 


RDW Std Deviation  50.2 H    (36.4-46.3)  fL


 


Plt Count  390 H D    (182-369)  K/mm3


 


MPV  9.6    (9.4-12.3)  fl


 


Neut % (Auto)  89.7 H    (34.0-71.1)  %


 


Lymph % (Auto)  2.3 L    (19.3-51.7)  %


 


Mono % (Auto)  7.1    (4.7-12.5)  %


 


Eos % (Auto)  0 L    (0.7-5.8)  


 


Baso % (Auto)  0.0 L    (0.1-1.2)  %


 


Neut # (Auto)  13.95 H    (1.56-6.13)  K/mm3


 


Lymph # (Auto)  0.35 L    (1.18-3.74)  K/mm3


 


Mono # (Auto)  1.11 H    (0.24-0.36)  K/mm3


 


Eos # (Auto)  0.00 L    (0.04-0.36)  K/mm3


 


Baso # (Auto)  0.00 L    (0.01-0.08)  K/mm3


 


Manual Slide Review  Abnormal smear    


 


Sodium   129 L   (136-145)  mEq/L


 


Potassium   5.0   (3.5-5.1)  mEq/L


 


Chloride   92 L   ()  mEq/L


 


Carbon Dioxide   26   (21-32)  mEq/L


 


Anion Gap   16.0 H   (5-15)  


 


BUN   87 H D   (7-18)  mg/dL


 


Creatinine   2.5 H   (0.55-1.02)  mg/dL


 


Est Cr Clr Drug Dosing   13.61   mL/min


 


Estimated GFR (MDRD)   18   (>60)  mL/min


 


BUN/Creatinine Ratio   34.8 H   (14-18)  


 


Glucose   169 H   ()  mg/dL


 


Calcium   9.0   (8.5-10.1)  mg/dL


 


Total Bilirubin   0.4   (0.2-1.0)  mg/dL


 


AST   33   (15-37)  U/L


 


ALT   51   (14-59)  U/L


 


Alkaline Phosphatase   102   ()  U/L


 


Troponin I    0.024  (0.00-0.056)  ng/mL


 


NT-Pro-B Natriuret Pep     (0-450)  pg/mL


 


Total Protein   6.5   (6.4-8.2)  g/dl


 


Albumin   3.2 L   (3.4-5.0)  g/dl


 


Globulin   3.3   gm/dL


 


Albumin/Globulin Ratio   1.0   (1-2)  


 


Lipase   80   ()  U/L


 


Urine Color     (Yellow)  


 


Urine Appearance     (Clear)  


 


Urine pH     (5.0-8.0)  


 


Ur Specific Gravity     (1.005-1.030)  


 


Urine Protein     (Negative)  


 


Urine Glucose (UA)     (Negative)  


 


Urine Ketones     (Negative)  


 


Urine Occult Blood     (Negative)  


 


Urine Nitrite     (Negative)  


 


Urine Bilirubin     (Negative)  


 


Urine Urobilinogen     (0.2-1.0)  


 


Ur Leukocyte Esterase     (Negative)  














  05/15/20 05/15/20 Range/Units





  11:00 12:10 


 


WBC    (3.98-10.04)  K/mm3


 


RBC    (3.98-5.22)  M/mm3


 


Hgb    (11.2-15.7)  gm/dl


 


Hct    (34.1-44.9)  %


 


MCV    (79.4-94.8)  fl


 


MCH    (25.6-32.2)  pg


 


MCHC    (32.2-35.5)  g/dl


 


RDW Std Deviation    (36.4-46.3)  fL


 


Plt Count    (182-369)  K/mm3


 


MPV    (9.4-12.3)  fl


 


Neut % (Auto)    (34.0-71.1)  %


 


Lymph % (Auto)    (19.3-51.7)  %


 


Mono % (Auto)    (4.7-12.5)  %


 


Eos % (Auto)    (0.7-5.8)  


 


Baso % (Auto)    (0.1-1.2)  %


 


Neut # (Auto)    (1.56-6.13)  K/mm3


 


Lymph # (Auto)    (1.18-3.74)  K/mm3


 


Mono # (Auto)    (0.24-0.36)  K/mm3


 


Eos # (Auto)    (0.04-0.36)  K/mm3


 


Baso # (Auto)    (0.01-0.08)  K/mm3


 


Manual Slide Review    


 


Sodium    (136-145)  mEq/L


 


Potassium    (3.5-5.1)  mEq/L


 


Chloride    ()  mEq/L


 


Carbon Dioxide    (21-32)  mEq/L


 


Anion Gap    (5-15)  


 


BUN    (7-18)  mg/dL


 


Creatinine    (0.55-1.02)  mg/dL


 


Est Cr Clr Drug Dosing    mL/min


 


Estimated GFR (MDRD)    (>60)  mL/min


 


BUN/Creatinine Ratio    (14-18)  


 


Glucose    ()  mg/dL


 


Calcium    (8.5-10.1)  mg/dL


 


Total Bilirubin    (0.2-1.0)  mg/dL


 


AST    (15-37)  U/L


 


ALT    (14-59)  U/L


 


Alkaline Phosphatase    ()  U/L


 


Troponin I    (0.00-0.056)  ng/mL


 


NT-Pro-B Natriuret Pep  1453 H   (0-450)  pg/mL


 


Total Protein    (6.4-8.2)  g/dl


 


Albumin    (3.4-5.0)  g/dl


 


Globulin    gm/dL


 


Albumin/Globulin Ratio    (1-2)  


 


Lipase    ()  U/L


 


Urine Color   Yellow  (Yellow)  


 


Urine Appearance   Clear  (Clear)  


 


Urine pH   6.0  (5.0-8.0)  


 


Ur Specific Gravity   1.020  (1.005-1.030)  


 


Urine Protein   Negative  (Negative)  


 


Urine Glucose (UA)   Negative  (Negative)  


 


Urine Ketones   Negative  (Negative)  


 


Urine Occult Blood   Negative  (Negative)  


 


Urine Nitrite   Negative  (Negative)  


 


Urine Bilirubin   Negative  (Negative)  


 


Urine Urobilinogen   0.2  (0.2-1.0)  


 


Ur Leukocyte Esterase   Negative  (Negative)  











Meds: 


Medications











Generic Name Dose Route Start Last Admin





  Trade Name Freq  PRN Reason Stop Dose Admin


 


Sodium Chloride  10 ml  05/15/20 10:47  05/15/20 11:26





  Saline Flush  FLUSH   10 ml





  ASDIRECTED PRN   Administration





  Keep Vein Open   





     





     





     














- Re-Assessments/Exams


Free Text/Narrative Re-Assessment/Exam: 





05/15/20 11:05


I ordered an IV saline lock, UA, labs and an US of her gallbladder.


05/15/20 13:16


Her WBC was elevated at 15.55.  Her Hgb is low at 11.1.  Her Na is low at 129.  

Her creatinine is elevated at 2.5.  Her glucose is 169.  Her troponin is 

negative.  Her BNP is elevated at 1453.  Her lipase is normal.  Her CXR shows 

nothing acute.  Her US shows somewhat less than optimal study as noted above.  

Several large shadowing gallstones within the gallbladder.  Sludge ball as well 

as several fixed intraluminal findings either due to polyps or adherent sludge.

  No discrete gallbladder wall thickening or biliary duct dilatation is seen.  

2 small cyst within the right kidney.





Her UA looks good.  She has a bad gallbladder and she is retaining fluid.  I 

feel she needs to be admitted.  I called Dr Bowser and she agreed to the 

admission.





Departure





- Departure


Time of Disposition: 13:20


Disposition: Admitted As Inpatient 66


Condition: Fair


Clinical Impression: 


 Hyponatremia, Renal insufficiency, Peripheral edema





Cholelithiases


Qualifiers:


 Cholelithiasis location: gallbladder Cholecystitis presence: without 

cholecystitis Biliary obstruction: without biliary obstruction Qualified Code(s)

: K80.20 - Calculus of gallbladder without cholecystitis without obstruction





CHF (congestive heart failure)


Qualifiers:


 Heart failure type: other Qualified Code(s): I50.9 - Heart failure, unspecified








- Discharge Information


Referrals: 


Pravin Horta MD [Primary Care Provider] - 


Forms:  ED Department Discharge





Sepsis Event Note





- Evaluation


Sepsis Screening Result: No Definite Risk





- Focused Exam


Vital Signs: 


 Vital Signs











  Temp Pulse Resp BP Pulse Ox


 


 05/15/20 13:11   86  14  120/56 L  100


 


 05/15/20 10:41  98.2 F  86  16  146/64 H  99











Date Exam was Performed: 05/15/20


Time Exam was Performed: 13:15





- My Orders


Last 24 Hours: 


My Active Orders





05/15/20 10:47


Sodium Chloride 0.9% [Saline Flush]   10 ml FLUSH ASDIRECTED PRN 


Peripheral IV Insertion Adult [OM.PC] Stat 





05/15/20 10:48


Peripheral IV Care [RC] .AS DIRECTED 





05/15/20 11:48


EKG Documentation Completion [RC] ASDIRECTED 


EKG 12 Lead [EK] Stat 





05/15/20 12:10


UA W/MICROSCOPIC [URIN] Stat 





05/15/20 13:15


MAGNESIUM [CHEM] Stat 


PHOSPHORUS [CHEM] Stat 














- Assessment/Plan


Last 24 Hours: 


My Active Orders





05/15/20 10:47


Sodium Chloride 0.9% [Saline Flush]   10 ml FLUSH ASDIRECTED PRN 


Peripheral IV Insertion Adult [OM.PC] Stat 





05/15/20 10:48


Peripheral IV Care [RC] .AS DIRECTED 





05/15/20 11:48


EKG Documentation Completion [RC] ASDIRECTED 


EKG 12 Lead [EK] Stat 





05/15/20 12:10


UA W/MICROSCOPIC [URIN] Stat 





05/15/20 13:15


MAGNESIUM [CHEM] Stat 


PHOSPHORUS [CHEM] Stat

## 2020-05-15 NOTE — PCM.DCSUM1
**Discharge Summary





- Hospital Course


HPI Initial Comments: 





This is an 85 year old female with extensive past medical history including 

atrial fibrillation, hypertension and heart failure who is brought to the ED 

via EMS from [facility] for worsening abdominal pain for 5 

days. 


AS per patient she has been having constant pain graded at a 6-9/10 for 5 days, 

described as stabbing in RUQ that radiates to her right flank and back which 

has not improved with anything but rather worsened today after she had a 

breakfast sandwich from [employer]. 


Once pain became unbearable she was brought in for further evaluation. 





Diagnosis: Stroke: No





- Discharge Data


Discharge Date: 05/15/20


Discharge Disposition: DC/Tfer to Acute Hospital 02


Condition: Good





- Referral to Home Health


Primary Care Physician: 


Pravin Horta MD








- Discharge Diagnosis/Problem(s)


(1) Acute kidney injury


SNOMED Code(s): 32333618, 31793676


   ICD Code: N17.9 - ACUTE KIDNEY FAILURE, UNSPECIFIED   Status: Acute   

Current Visit: Yes   





(2) Pleural effusion, right


SNOMED Code(s): 01919346


   ICD Code: J90 - PLEURAL EFFUSION, NOT ELSEWHERE CLASSIFIED   Status: Acute   

Current Visit: Yes   





(3) Bilateral lower extremity edema


SNOMED Code(s): 860061399, 45326170, 428819463


   ICD Code: R60.0 - LOCALIZED EDEMA   Status: Acute   Current Visit: Yes   





(4) Anemia


SNOMED Code(s): 943290034


   ICD Code: D64.9 - ANEMIA, UNSPECIFIED   Status: Acute   Priority: High   

Current Visit: No   


Qualifiers: 


   Anemia type: iron deficiency   Iron deficiency anemia type: unspecified iron 

deficiency   Qualified Code(s): D50.9 - Iron deficiency anemia, unspecified   





(5) Atrial fibrillation


SNOMED Code(s): 49604351


   ICD Code: I48.91 - UNSPECIFIED ATRIAL FIBRILLATION   Status: Chronic   

Priority: High   Current Visit: No   


Qualifiers: 


   Atrial fibrillation type: paroxysmal   Qualified Code(s): I48.0 - Paroxysmal 

atrial fibrillation   





(6) Cholelithiases


SNOMED Code(s): 877938335


   ICD Code: K80.20 - CALCULUS OF GALLBLADDER W/O CHOLECYSTITIS W/O OBSTRUCTION

   Status: Acute   Priority: High   Current Visit: Yes   


Qualifiers: 


   Cholelithiasis location: gallbladder   Cholecystitis presence: without 

cholecystitis   Biliary obstruction: without biliary obstruction   Qualified 

Code(s): K80.20 - Calculus of gallbladder without cholecystitis without 

obstruction   





(7) Chronic constipation


SNOMED Code(s): 265713943


   ICD Code: K59.09 - OTHER CONSTIPATION   Status: Chronic   Priority: Low   

Current Visit: No   





(8) Chronic renal insufficiency, stage III (moderate)


SNOMED Code(s): 880098590


   ICD Code: N18.3 - CHRONIC KIDNEY DISEASE, STAGE 3 (MODERATE)   Status: Acute

   Current Visit: No   





(9) Heart failure


SNOMED Code(s): 63308013


   ICD Code: I50.9 - HEART FAILURE, UNSPECIFIED   Status: Acute   Current Visit

: No   





(10) Hypertension


SNOMED Code(s): 16857808


   ICD Code: I10 - ESSENTIAL (PRIMARY) HYPERTENSION   Status: Chronic   Priority

: Low   Current Visit: No   


Qualifiers: 


   Hypertension type: unspecified   Qualified Code(s): I10 - Essential (primary

) hypertension   





(11) Hypomagnesemia


SNOMED Code(s): 764704023


   ICD Code: E83.42 - HYPOMAGNESEMIA   Status: Acute   Priority: High   Current 

Visit: Yes   





(12) Hyponatremia


SNOMED Code(s): 41520746


   ICD Code: E87.1 - HYPO-OSMOLALITY AND HYPONATREMIA   Status: Acute   Priority

: High   Current Visit: Yes   





- Patient Summary/Data


Consults: 


 Consultations





05/15/20 14:01


Consult to Case Management/ [CONS] Routine 


Consult to Physician [CONS] Routine 


Consult to Spiritual Care [CONS] Routine 


OT Evaluation and Treatment [CONS] Routine 


PT Evaluation and Treatment [CONS] Routine 











Hospital Course: 





Once in the ED patient was found to afebrile with leukocytosis with a left shift

, no bandemia, hyponatremia (129), Hypochloremia (92) and acutely worsened GFR 

from 30s at baseline to 18 today. 


An abdominal US was performed and found to have multiple large stones within 

gallbladder with sludge ball and sludge vs polyps within gallbladder as well, 

it is described as a suboptimal study with suboptimal visualization of the CBD 

but reported as normal on report. 


CXR was also performed and patient was found to have a new onset small right 

sided pleural effusion. 


VS stable


Patient grading pain at an 8 upon my interrogation with significant McBurney 

sign and exquisite tenderness over RUQ as well as soreness throughout the rest 

of her abdomen as well as hepatomegaly. 


2+ pitting edema of BL LE


Patient at baseline has refractory edema to lower extremities however they are 

weeping at this time, she is also unable to lay flat but when asked about 

states this is her baseline. 


She also is wheelchair bound as per [facility]'s staff





With concern for cholangitis Zosyn was started.





Surgical consult was obtained and they recommended patient get a percutaneous 

cholecystostomy, she is a poor candidate for cholecystectomy due to overall 

health state. 





Case discussed with Kyree at [facility] who accepted transfer at 18:30 on 5/15/

2020








- Discharge Plan


Home Medications: 


 Home Meds





Acetaminophen [Tylenol] 325 mg PO Q6HR PRN 12/23/18 [History]


Bumetanide [Bumex] 2 mg PO DAILY 12/23/18 [History]


Loratadine 10 mg PO DAILY PRN 12/23/18 [History]


Multivitamin [Multi-Vitamin Daily] 1 tab PO DAILY 12/23/18 [History]


Simvastatin 20 mg PO BEDTIME 12/23/18 [History]


allopurinoL [Zyloprim] 100 mg PO BID 12/23/18 [History]


traMADol [Ultram] 50 mg PO BID PRN 12/23/18 [History]


Amiodarone [Cordarone] 200 mg PO DAILY #30 tab 12/27/18 [Rx]


Apixaban [Eliquis] 2.5 mg PO BID #30 12/27/18 [Rx]


Acetaminophen [Tylenol] 650 mg PO QID 04/15/19 [History]


Famotidine [Pepcid] 20 mg PO DAILY 04/15/19 [History]


Sennosides/Docusate Sodium [Senna Plus Tablet] 1 tab PO BID PRN 04/15/19 [

History]


Bumetanide 1 mg PO QPM 12/17/19 [History]


Niacin (Inositol Niacinate) [Niacin Flush Free 500 mg Cap] 400 mg PO DAILY 12/17 /19 [History]


Polyvinyl Alcohol/Povidone/Pf [Refresh Classic Eye Drops] 1 each EYEBOTH TID 12/ 17/19 [History]


Potassium Chloride 10 meq PO DAILY 12/17/19 [History]


Sennosides/Docusate Sodium [Senna Plus 8.6-50 mg Tablet] 1 tab PO BID 12/17/19 [

History]


guaiFENesin [Mucinex] 600 mg PO BID 12/17/19 [History]


oxyCODONE 5 mg PO BID 12/17/19 [History]


oxyCODONE 5 mg PO Q4HR PRN 12/17/19 [History]


polyethylene glycoL 3350 [MiraLAX] 17 gm PO DAILY PRN 12/17/19 [History]


Bisacodyl [Laxative Suppository] 10 mg RC ONETIME PRN 05/10/20 [History]


Ipratropium/Albuterol Sulfate [Iprat-Albut 0.5-3(2.5) MG/3 ML] 3 ml IH QID PRN 

05/10/20 [History]


Iron Ag,Ps/C/Fa6/B12/Zn/SA/Sto [Niferex Tablet] 1 each PO BID 05/10/20 [History]


Spironolactone [Aldactone] 25 mg PO DAILY 05/10/20 [History]


Ascorbic Acid 500 mg PO BID 05/15/20 [History]


Menthol [Aspercreme Max] 1 applic TOP BID PRN 05/15/20 [History]








Forms:  ED Department Discharge


Referrals: 


Pravin Horta MD [Primary Care Provider] - 





- Discharge Summary/Plan Comment


DC Time >30 min.: Yes





- General Info


Date of Service: 05/15/20


Subjective Update: 





Pain in RUQ 8/10








- Patient Data


Vitals - Most Recent: 


 Last Vital Signs











Temp  97.9 F   05/15/20 14:50


 


Pulse  87   05/15/20 14:50


 


Resp  16   05/15/20 14:50


 


BP  136/67   05/15/20 14:50


 


Pulse Ox  100   05/15/20 14:50











Weight - Most Recent: 65.317 kg





- Exam


General: Reports: Alert, Oriented, Cooperative, Moderate Distress


HEENT: Reports: Pupils Equal, Pupils Reactive, Mucous Membr. Moist/Pink


Neck: Reports: Supple


Lungs: Reports: Decreased Breath Sounds, Crackles.  Denies: Rales, Rhonchi, Rub

, Stridor, Wheezing


Cardiovascular: Reports: Regular Rate, Irregular Rhythm.  Denies: Murmurs, 

Gallops, Rubs


GI/Abdominal Exam: Distended, Guarding, Tender, Abnormal Bowel Sounds, 

Hepatomegaly


Extremities: Other (2+ pitting edema BL with chronic skin changes from edema)


Psy/Mental Status: Reports: Alert

## 2020-05-15 NOTE — CR
Chest: Portable view of the chest was obtained.

 

Comparison: Prior chest x-ray of 12/ 17/19.

 

Heart size and mediastinum are normal.  Lungs are clear with no acute 

parenchymal change.  Slight degenerative change is scattered within 

the spine with disc space narrowing and endplate spurring as well as 

degenerative change within both shoulders, worse on the left side.

 

Impression:

1.  Findings as noted above.

2.  Nothing acute is appreciated on portable chest x-ray.

 

Diagnostic code #2

 

This report was dictated in MDT

## 2020-05-15 NOTE — PCM.CONS
H&P History of Present Illness





- General


Date of Service: 05/15/20


Admit Problem/Dx: 


 Admission Diagnosis/Problem





Admission Diagnosis/Problem      Cholecystitis








Source of Information: Patient, Provider





- History of Present Illness


Initial Comments - Free Text/Narative: 





The patient is an 86 y/o lady who presents from her SNF with complaints of 5d 

of RUQ pain. The pain radiates substernally and to her back. She reports some 

difficulty breathing from the pain. She had a bowel movement today. She has 

black, sticky stools which she attributes to her iron intake. Her stools have 

been black for about 2 months. She has been eating normally. She denies any 

nausea or vomiting. 


  ** Abdominal


Pain Score (Numeric/FACES): 8





- Related Data


Allergies/Adverse Reactions: 


 Allergies











Allergy/AdvReac Type Severity Reaction Status Date / Time


 


codeine Allergy Intermediate Rash Verified 05/15/20 17:31


 


iodine Allergy Intermediate Rash Verified 05/15/20 17:31


 


metoprolol [From Toprol XL] Allergy Intermediate swelling Verified 05/15/20 17:

31





   of lips  


 


naproxen [From Naprelan] Allergy Intermediate Rash Verified 05/15/20 17:31


 


prednisone Allergy Mild Other Verified 05/15/20 17:31


 


propoxyphene AdvReac Mild urine Verified 05/15/20 17:31





   retention  











Home Medications: 


 Home Meds





Acetaminophen [Tylenol] 325 mg PO Q6HR PRN 12/23/18 [History]


Bumetanide [Bumex] 2 mg PO DAILY 12/23/18 [History]


Loratadine 10 mg PO DAILY PRN 12/23/18 [History]


Multivitamin [Multi-Vitamin Daily] 1 tab PO DAILY 12/23/18 [History]


Simvastatin 20 mg PO BEDTIME 12/23/18 [History]


allopurinoL [Zyloprim] 100 mg PO BID 12/23/18 [History]


traMADol [Ultram] 50 mg PO BID PRN 12/23/18 [History]


Amiodarone [Cordarone] 200 mg PO DAILY #30 tab 12/27/18 [Rx]


Apixaban [Eliquis] 2.5 mg PO BID #30 12/27/18 [Rx]


Acetaminophen [Tylenol] 650 mg PO QID 04/15/19 [History]


Famotidine [Pepcid] 20 mg PO DAILY 04/15/19 [History]


Sennosides/Docusate Sodium [Senna Plus Tablet] 1 tab PO BID PRN 04/15/19 [

History]


Bumetanide 1 mg PO QPM 12/17/19 [History]


Niacin (Inositol Niacinate) [Niacin Flush Free 500 mg Cap] 400 mg PO DAILY 12/17 /19 [History]


Polyvinyl Alcohol/Povidone/Pf [Refresh Classic Eye Drops] 1 each EYEBOTH TID 12/ 17/19 [History]


Potassium Chloride 10 meq PO DAILY 12/17/19 [History]


Sennosides/Docusate Sodium [Senna Plus 8.6-50 mg Tablet] 1 tab PO BID 12/17/19 [

History]


guaiFENesin [Mucinex] 600 mg PO BID 12/17/19 [History]


oxyCODONE 5 mg PO BID 12/17/19 [History]


oxyCODONE 5 mg PO Q4HR PRN 12/17/19 [History]


polyethylene glycoL 3350 [MiraLAX] 17 gm PO DAILY PRN 12/17/19 [History]


Bisacodyl [Laxative Suppository] 10 mg RC ONETIME PRN 05/10/20 [History]


Ipratropium/Albuterol Sulfate [Iprat-Albut 0.5-3(2.5) MG/3 ML] 3 ml IH QID PRN 

05/10/20 [History]


Iron Ag,Ps/C/Fa6/B12/Zn/SA/Sto [Niferex Tablet] 1 each PO BID 05/10/20 [History]


Spironolactone [Aldactone] 25 mg PO DAILY 05/10/20 [History]


Ascorbic Acid 500 mg PO BID 05/15/20 [History]


Menthol [Aspercreme Max] 1 applic TOP BID PRN 05/15/20 [History]











Past Medical History


HEENT History: Reports: Hard of Hearing, Impaired Vision, Other (See Below)


Other HEENT History: hearing aid to left ear, wears glasses for reading


Cardiovascular History: Reports: Afib, High Cholesterol, Hypertension


Other Cardiovascular History: edema generalized, HF, HTN with HF, dizziness, 

giddiness, cardiac arrythmia, anemia, poisoning of cardiac stimulant glycosides 

and drugs wtih similar action accidental digoxin toxicity, localized peripheral 

edema


Respiratory History: Reports: Pneumonia, Recurrent


Other Respiratory History: mycoplasma pneumoniae


Gastrointestinal History: Reports: GERD


Other Gastrointestinal History: nutritional deficiency, dysphagia, pharyngeal 

phase


Genitourinary History: Reports: Chronic Renal Insuffiency


Other Genitourinary History: hypokalemia, CKD stage III, hypomagnesemia


OB/GYN History: Reports: Pregnancy


Musculoskeletal History: Reports: Gout, Osteoarthritis


Other Musculoskeletal History: Chronic bilateral shoulder and back pain, 

chronic bilateral knee pain, multiple rib fx's L side, muscle weakness, 

unspecified abnormality of gait and mobility, muscle weakness, need for 

assistance with personal care., gout,


Neurological History: Reports: Vertigo


Other Neuro History: dizziness


Other Psychiatric History: "Poisoning by cardiac-stimulant glycosides and drugs 

of similar action, accidental, subsequent encounter-digoxin toxicity"


Endocrine/Metabolic History: Reports: Hypokalemia, Hypomagnesemia, Obesity/BMI 

30+


Other Endocrine/Metabolic History: gout, lymphedema, obesity, drug induced 

constipation


Hematologic History: Reports: Anemia, Iron Deficiency, Other (See Below)


Other Hematologic History: lymphedema


Immunologic History: Reports: None


Other Immunologic History: Allergy unsecified initial encounter


Oncologic (Cancer) History: Reports: Breast


Other Oncologic History: potential breast cancer, recent diagnossis.  breast 

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 

breast.


Dermatologic History: Reports: Other (See Below)


Other Dermatologic History: cellulitis to leg right lower limb, wound to L arm, 

open wound to left forearm





- Infectious Disease History


Infectious Disease History: Reports: Chicken Pox, Measles, Shingles





- Past Surgical History


HEENT Surgical History: Reports: Cataract Surgery


Respiratory Surgical History: Reports: None


Endocrine Surgical History: Reports: None


Neurological Surgical History: Reports: None


Oncologic Surgical History: Reports: Biopsy of Breast, Lumpectomy





Social & Family History





- Family History


Family Medical History: Noncontributory





- Tobacco Use


Smoking Status *Q: Never Smoker


Second Hand Smoke Exposure: No





- Caffeine Use


Caffeine Use: Reports: Coffee


Other Caffeine Use: rarely.





- Recreational Drug Use


Recreational Drug Use: No





- Living Situation & Occupation


Living situation: Reports: , Extended Care Facility ([facility])


Occupation: Retired





H&P Review of Systems





- Review of Systems:


Review Of Systems: See Below


General: Reports: No Symptoms


HEENT: Reports: No Symptoms


Pulmonary: Reports: No Symptoms


Cardiovascular: Reports: No Symptoms


Gastrointestinal: Reports: Abdominal Pain


Genitourinary: Reports: No Symptoms


Musculoskeletal: Reports: Shoulder Pain, Back Pain


Skin: Reports: No Symptoms


Neurological: Reports: Difficulty Walking, Gait Disturbance


Hematologic/Lymphatic: Reports: Easy Bruising





Exam





- Exam


Exam: See Below





- Vital Signs


Vital Signs: 


 Last Vital Signs











Temp  36.6 C   05/15/20 14:50


 


Pulse  87   05/15/20 14:50


 


Resp  16   05/15/20 14:50


 


BP  136/67   05/15/20 14:50


 


Pulse Ox  100   05/15/20 14:50











Weight: 65.317 kg





- Exam


Quality Assessment: No: Supplemental Oxygen


General: Alert, Cooperative


HEENT: Conjunctiva Clear, EOMI


Neck: Supple


Lungs: Normal Respiratory Effort


Cardiovascular: Regular Rate, Regular Rhythm


GI/Abdominal Exam: Soft, Tender (in RUQ), Hepatomegaly


Extremities: Pedal Edema


Skin: Warm, Intact, Other (fluid weeping from RLE)


Neurological: Cranial Nerves Intact


Neuro Extensive - Mental Status: Normal Mood/Affect





- Patient Data


Lab Results Last 24 hrs: 


 Laboratory Results - last 24 hr











  05/15/20 05/15/20 05/15/20 Range/Units





  11:00 11:00 11:00 


 


WBC  15.55 H    (3.98-10.04)  K/mm3


 


RBC  3.35 L    (3.98-5.22)  M/mm3


 


Hgb  11.1 L    (11.2-15.7)  gm/dl


 


Hct  34.3    (34.1-44.9)  %


 


MCV  102.4 H    (79.4-94.8)  fl


 


MCH  33.1 H    (25.6-32.2)  pg


 


MCHC  32.4    (32.2-35.5)  g/dl


 


RDW Std Deviation  50.2 H    (36.4-46.3)  fL


 


Plt Count  390 H D    (182-369)  K/mm3


 


MPV  9.6    (9.4-12.3)  fl


 


Neut % (Auto)  89.7 H    (34.0-71.1)  %


 


Lymph % (Auto)  2.3 L    (19.3-51.7)  %


 


Mono % (Auto)  7.1    (4.7-12.5)  %


 


Eos % (Auto)  0 L    (0.7-5.8)  


 


Baso % (Auto)  0.0 L    (0.1-1.2)  %


 


Neut # (Auto)  13.95 H    (1.56-6.13)  K/mm3


 


Lymph # (Auto)  0.35 L    (1.18-3.74)  K/mm3


 


Mono # (Auto)  1.11 H    (0.24-0.36)  K/mm3


 


Eos # (Auto)  0.00 L    (0.04-0.36)  K/mm3


 


Baso # (Auto)  0.00 L    (0.01-0.08)  K/mm3


 


Manual Slide Review  Abnormal smear    


 


Sodium   129 L   (136-145)  mEq/L


 


Potassium   5.0   (3.5-5.1)  mEq/L


 


Chloride   92 L   ()  mEq/L


 


Carbon Dioxide   26   (21-32)  mEq/L


 


Anion Gap   16.0 H   (5-15)  


 


BUN   87 H D   (7-18)  mg/dL


 


Creatinine   2.5 H   (0.55-1.02)  mg/dL


 


Est Cr Clr Drug Dosing   13.61   mL/min


 


Estimated GFR (MDRD)   18   (>60)  mL/min


 


BUN/Creatinine Ratio   34.8 H   (14-18)  


 


Glucose   169 H   ()  mg/dL


 


Calcium   9.0   (8.5-10.1)  mg/dL


 


Phosphorus     (2.6-4.7)  mg/dL


 


Magnesium     (1.8-2.4)  mg/dl


 


Total Bilirubin   0.4   (0.2-1.0)  mg/dL


 


GGT     (5-55)  U/L


 


AST   33   (15-37)  U/L


 


ALT   51   (14-59)  U/L


 


Alkaline Phosphatase   102   ()  U/L


 


Troponin I    0.024  (0.00-0.056)  ng/mL


 


NT-Pro-B Natriuret Pep     (0-450)  pg/mL


 


Total Protein   6.5   (6.4-8.2)  g/dl


 


Albumin   3.2 L   (3.4-5.0)  g/dl


 


Globulin   3.3   gm/dL


 


Albumin/Globulin Ratio   1.0   (1-2)  


 


Lipase   80   ()  U/L


 


Urine Color     (Yellow)  


 


Urine Appearance     (Clear)  


 


Urine pH     (5.0-8.0)  


 


Ur Specific Gravity     (1.005-1.030)  


 


Urine Protein     (Negative)  


 


Urine Glucose (UA)     (Negative)  


 


Urine Ketones     (Negative)  


 


Urine Occult Blood     (Negative)  


 


Urine Nitrite     (Negative)  


 


Urine Bilirubin     (Negative)  


 


Urine Urobilinogen     (0.2-1.0)  


 


Ur Leukocyte Esterase     (Negative)  


 


Urine RBC     (0-5)  /hpf


 


Urine WBC     (0-5)  /hpf


 


Ur Squamous Epith Cells     (0-5)  /hpf


 


Urine Bacteria     (FEW)  /hpf


 


Urine Mucus     (FEW)  /hpf


 


Ur Random Creatinine     (30.0-125.0)  mg/dL


 


Ur Random Sodium     ()  mEq/L














  05/15/20 05/15/20 05/15/20 Range/Units





  11:00 11:00 11:00 


 


WBC     (3.98-10.04)  K/mm3


 


RBC     (3.98-5.22)  M/mm3


 


Hgb     (11.2-15.7)  gm/dl


 


Hct     (34.1-44.9)  %


 


MCV     (79.4-94.8)  fl


 


MCH     (25.6-32.2)  pg


 


MCHC     (32.2-35.5)  g/dl


 


RDW Std Deviation     (36.4-46.3)  fL


 


Plt Count     (182-369)  K/mm3


 


MPV     (9.4-12.3)  fl


 


Neut % (Auto)     (34.0-71.1)  %


 


Lymph % (Auto)     (19.3-51.7)  %


 


Mono % (Auto)     (4.7-12.5)  %


 


Eos % (Auto)     (0.7-5.8)  


 


Baso % (Auto)     (0.1-1.2)  %


 


Neut # (Auto)     (1.56-6.13)  K/mm3


 


Lymph # (Auto)     (1.18-3.74)  K/mm3


 


Mono # (Auto)     (0.24-0.36)  K/mm3


 


Eos # (Auto)     (0.04-0.36)  K/mm3


 


Baso # (Auto)     (0.01-0.08)  K/mm3


 


Manual Slide Review     


 


Sodium     (136-145)  mEq/L


 


Potassium     (3.5-5.1)  mEq/L


 


Chloride     ()  mEq/L


 


Carbon Dioxide     (21-32)  mEq/L


 


Anion Gap     (5-15)  


 


BUN     (7-18)  mg/dL


 


Creatinine     (0.55-1.02)  mg/dL


 


Est Cr Clr Drug Dosing     mL/min


 


Estimated GFR (MDRD)     (>60)  mL/min


 


BUN/Creatinine Ratio     (14-18)  


 


Glucose     ()  mg/dL


 


Calcium     (8.5-10.1)  mg/dL


 


Phosphorus   3.4   (2.6-4.7)  mg/dL


 


Magnesium   1.7 L   (1.8-2.4)  mg/dl


 


Total Bilirubin     (0.2-1.0)  mg/dL


 


GGT    39  (5-55)  U/L


 


AST     (15-37)  U/L


 


ALT     (14-59)  U/L


 


Alkaline Phosphatase     ()  U/L


 


Troponin I     (0.00-0.056)  ng/mL


 


NT-Pro-B Natriuret Pep  1453 H    (0-450)  pg/mL


 


Total Protein     (6.4-8.2)  g/dl


 


Albumin     (3.4-5.0)  g/dl


 


Globulin     gm/dL


 


Albumin/Globulin Ratio     (1-2)  


 


Lipase     ()  U/L


 


Urine Color     (Yellow)  


 


Urine Appearance     (Clear)  


 


Urine pH     (5.0-8.0)  


 


Ur Specific Gravity     (1.005-1.030)  


 


Urine Protein     (Negative)  


 


Urine Glucose (UA)     (Negative)  


 


Urine Ketones     (Negative)  


 


Urine Occult Blood     (Negative)  


 


Urine Nitrite     (Negative)  


 


Urine Bilirubin     (Negative)  


 


Urine Urobilinogen     (0.2-1.0)  


 


Ur Leukocyte Esterase     (Negative)  


 


Urine RBC     (0-5)  /hpf


 


Urine WBC     (0-5)  /hpf


 


Ur Squamous Epith Cells     (0-5)  /hpf


 


Urine Bacteria     (FEW)  /hpf


 


Urine Mucus     (FEW)  /hpf


 


Ur Random Creatinine     (30.0-125.0)  mg/dL


 


Ur Random Sodium     ()  mEq/L














  05/15/20 05/15/20 Range/Units





  12:10 12:10 


 


WBC    (3.98-10.04)  K/mm3


 


RBC    (3.98-5.22)  M/mm3


 


Hgb    (11.2-15.7)  gm/dl


 


Hct    (34.1-44.9)  %


 


MCV    (79.4-94.8)  fl


 


MCH    (25.6-32.2)  pg


 


MCHC    (32.2-35.5)  g/dl


 


RDW Std Deviation    (36.4-46.3)  fL


 


Plt Count    (182-369)  K/mm3


 


MPV    (9.4-12.3)  fl


 


Neut % (Auto)    (34.0-71.1)  %


 


Lymph % (Auto)    (19.3-51.7)  %


 


Mono % (Auto)    (4.7-12.5)  %


 


Eos % (Auto)    (0.7-5.8)  


 


Baso % (Auto)    (0.1-1.2)  %


 


Neut # (Auto)    (1.56-6.13)  K/mm3


 


Lymph # (Auto)    (1.18-3.74)  K/mm3


 


Mono # (Auto)    (0.24-0.36)  K/mm3


 


Eos # (Auto)    (0.04-0.36)  K/mm3


 


Baso # (Auto)    (0.01-0.08)  K/mm3


 


Manual Slide Review    


 


Sodium    (136-145)  mEq/L


 


Potassium    (3.5-5.1)  mEq/L


 


Chloride    ()  mEq/L


 


Carbon Dioxide    (21-32)  mEq/L


 


Anion Gap    (5-15)  


 


BUN    (7-18)  mg/dL


 


Creatinine    (0.55-1.02)  mg/dL


 


Est Cr Clr Drug Dosing    mL/min


 


Estimated GFR (MDRD)    (>60)  mL/min


 


BUN/Creatinine Ratio    (14-18)  


 


Glucose    ()  mg/dL


 


Calcium    (8.5-10.1)  mg/dL


 


Phosphorus    (2.6-4.7)  mg/dL


 


Magnesium    (1.8-2.4)  mg/dl


 


Total Bilirubin    (0.2-1.0)  mg/dL


 


GGT    (5-55)  U/L


 


AST    (15-37)  U/L


 


ALT    (14-59)  U/L


 


Alkaline Phosphatase    ()  U/L


 


Troponin I    (0.00-0.056)  ng/mL


 


NT-Pro-B Natriuret Pep    (0-450)  pg/mL


 


Total Protein    (6.4-8.2)  g/dl


 


Albumin    (3.4-5.0)  g/dl


 


Globulin    gm/dL


 


Albumin/Globulin Ratio    (1-2)  


 


Lipase    ()  U/L


 


Urine Color  Yellow   (Yellow)  


 


Urine Appearance  Clear   (Clear)  


 


Urine pH  6.0   (5.0-8.0)  


 


Ur Specific Gravity  1.020   (1.005-1.030)  


 


Urine Protein  Negative   (Negative)  


 


Urine Glucose (UA)  Negative   (Negative)  


 


Urine Ketones  Negative   (Negative)  


 


Urine Occult Blood  Negative   (Negative)  


 


Urine Nitrite  Negative   (Negative)  


 


Urine Bilirubin  Negative   (Negative)  


 


Urine Urobilinogen  0.2   (0.2-1.0)  


 


Ur Leukocyte Esterase  Negative   (Negative)  


 


Urine RBC  Not seen   (0-5)  /hpf


 


Urine WBC  0-5   (0-5)  /hpf


 


Ur Squamous Epith Cells  0-5   (0-5)  /hpf


 


Urine Bacteria  Not seen   (FEW)  /hpf


 


Urine Mucus  Not seen   (FEW)  /hpf


 


Ur Random Creatinine   41.1  (30.0-125.0)  mg/dL


 


Ur Random Sodium   19 L  ()  mEq/L











Result Diagrams: 


 05/15/20 11:00





 05/15/20 11:00





Sepsis Event Note





- Evaluation


Sepsis Screening Result: No Definite Risk





- Focused Exam


Vital Signs: 


 Vital Signs











  Temp Temp Pulse Pulse Resp BP BP


 


 05/15/20 14:50  36.6 C   87   16  136/67 


 


 05/15/20 13:11     86  14   120/56 L


 


 05/15/20 10:41   36.8 C   86  16   146/64 H














  Pulse Ox


 


 05/15/20 14:50  100


 


 05/15/20 13:11  100


 


 05/15/20 10:41  99











Date Exam was Performed: 05/15/20


Time Exam was Performed: 18:53





Consult PN Assessment/Plan


Procedures: 


Procedures





AGENT NOS ASSAY W/OPTIC (12/17/19)


AIRWAY INHALATION TREATMENT (12/17/19)


ASSAY OF DIGOXIN TOTAL (12/23/18)


ASSAY OF FREE THYROXINE (12/23/18)


ASSAY OF IRON (12/23/18)


ASSAY OF LACTIC ACID (12/17/19)


ASSAY OF MAGNESIUM (12/17/19)


ASSAY OF NATRIURETIC PEPTIDE (12/17/19)


ASSAY OF PHOSPHORUS (12/17/19)


ASSAY OF TRANSFERRIN (12/23/18)


ASSAY OF TROPONIN QUANT (12/17/19)


ASSAY THYROID STIM HORMONE (12/23/18)


BL SMEAR W/DIFF WBC COUNT (12/17/19)


BLOOD CULTURE FOR BACTERIA (12/17/19)


BLOOD TRANSFUSION SERVICE (11/02/19)


BLOOD TYPING SEROLOGIC ABO (11/02/19)


BLOOD TYPING SEROLOGIC RH(D) (11/02/19)


BX BREAST 1ST LESION STRTCTC (11/27/18)


C-REACTIVE PROTEIN (12/17/19)


CHEST WALL MANIPULATION (12/17/19)


CHEST WALL MANIPULATION (12/17/19)


CHEST WALL MANIPULATION (11/02/19)


CHYLMD PNEUM DNA AMP PROBE (12/17/19)


COMPATIBILITY TEST ANTIGLOB (11/02/19)


COMPLETE CBC AUTOMATED (12/17/19)


COMPLETE CBC W/AUTO DIFF WBC (12/17/19)


COMPREHEN METABOLIC PANEL (12/17/19)


CREATINE MB FRACTION (12/17/19)


CT HEAD/BRAIN W/O DYE (11/02/19)


CT MAXILLOFACIAL W/O DYE (11/02/19)


CT NECK SPINE W/O DYE (11/02/19)


CT THORAX W/O DYE (11/02/19)


CULTURE OTHR SPECIMN AEROBIC (12/17/19)


DETECT AGENT NOS DNA AMP (12/17/19)


DXA BONE DENSITY AXIAL (04/19/16)


ELECTROCARDIOGRAM TRACING (12/17/19)


EMERGENCY DEPT VISIT (05/10/20)


EMERGENCY DEPT VISIT (12/17/19)


EMERGENCY DEPT VISIT (06/06/19)


EMERGENCY DEPT VISIT (12/23/18)


EXTRACRANIAL BILAT STUDY (12/23/18)


EXTREMITY STUDY (04/15/19)


GAIT TRAINING THERAPY (12/17/19)


GLUCOSE BLOOD TEST (12/17/19)


INFLUENZA ASSAY W/OPTIC (12/17/19)


INSERT TEMP BLADDER CATH (12/23/18)


M.PNEUMON DNA AMP PROBE (12/17/19)


MEASURE BLOOD OXYGEN LEVEL (12/17/19)


METABOLIC PANEL TOTAL CA (12/17/19)


MR-STAPH DNA AMP PROBE (12/17/19)


MYCOPLASMA ANTIBODY (12/17/19)


OCCULT BLD FECES 1-3 TESTS (12/23/18)


OT EVAL LOW COMPLEX 30 MIN (12/17/19)


OT EVAL MOD COMPLEX 45 MIN (11/02/19)


PROTHROMBIN TIME (12/17/19)


PT EVAL LOW COMPLEX 20 MIN (12/23/18)


PT EVAL MOD COMPLEX 30 MIN (12/17/19)


QUANTITATIVE ASSAY DRUG (12/23/18)


RBC ANTIBODY SCREEN (11/02/19)


RBC SED RATE AUTOMATED (12/17/19)


RESP VIRUS 6-11 TARGETS (12/17/19)


ROUTINE VENIPUNCTURE (12/17/19)


SMEAR GRAM STAIN (12/17/19)


THER/DIAG CONCURRENT INF (12/09/18)


THER/PROPH/DIAG INJ IV PUSH (12/23/18)


THER/PROPH/DIAG IV INF ADDON (12/17/19)


THER/PROPH/DIAG IV INF INIT (12/17/19)


THERAPEUTIC ACTIVITIES (12/17/19)


THERAPEUTIC EXERCISES (12/17/19)


THROMBOPLASTIN TIME PARTIAL (12/17/19)


TTE W/DOPPLER COMPLETE (12/23/18)


TX/PRO/DX INJ NEW DRUG ADDON (12/23/18)


TX/PRO/DX INJ SAME DRUG ADON (12/23/18)


URINALYSIS AUTO W/SCOPE (12/23/18)


URINE CULTURE/COLONY COUNT (12/23/18)


VITAMIN D 25 HYDROXY (12/23/18)


X-RAY EXAM ABDOMEN 1 VIEW (05/10/20)


X-RAY EXAM CHEST 1 VIEW (12/17/19)


X-RAY EXAM CHEST 2 VIEWS (11/02/19)


X-RAY EXAM HIP UNI 2-3 VIEWS (06/19/18)


X-RAY EXAM KNEE 4 OR MORE (11/02/19)


X-RAY EXAM OF FOREARM (11/02/19)








(1) Cholecystitis


SNOMED Code(s): 97447055


   Code(s): K81.9 - CHOLECYSTITIS, UNSPECIFIED   Current Visit: Yes   


Problem List Initiated/Reviewed/Updated: Yes


Plan: 





86 y/o lady with cholecystitis, symptoms present for 5 days


- pt is very frail and high risk operative candidate. Recommend percutaneous 

cholecystostomy tube placement


- will need reversal of anticoagulation before can have any procedure safely


- correction of electrolytes per primary team


- recommend transfer to higher level of care for continued management





Megan Becerra MD


General surgery

## 2020-05-15 NOTE — US
Limited abdominal ultrasound: Multiple real-time images of the upper 

right abdomen were obtained.

 

Technologist's note: Suboptimal exam, patient refused to lay supine or

 decubitus due to pain and difficulty breathing

 

Liver shows no discrete abnormality.  Gallbladder shows several large 

gallstones.  Nonshadowing sludge ball is also seen.  Several luminal 

abnormalities are seen either due to polyps or adherent sludge.  No 

discrete gallbladder wall thickening is noted.  Common bile duct 

measures normal.

 

Right kidney shows no hydronephrosis or mass.  Right kidney has a 

length of 10.0 cm.  2 small cysts are noted within the right kidney.

 

Visualized portions of the pancreas shows no discrete abnormality.  

Inferior vena cava is patent.  Main portal vein shows normal 

hepatopedal flow.

 

Impression:

1.  Somewhat less than optimal study as noted above.

2.  Several large shadowing gallstones within the gallbladder.  Sludge

 ball as well as several fixed intraluminal findings either due to 

polyps or adherent sludge.

3.  No discrete gallbladder wall thickening or biliary duct dilatation

 is seen.

4.  2 small cyst within the right kidney.

 

Diagnostic code #3

 

This report was dictated in MDT

## 2020-05-15 NOTE — PCM.HP.2
H&P History of Present Illness





- General


Date of Service: 05/15/20


Admit Problem/Dx: 


 Admission Diagnosis/Problem





Admission Diagnosis/Problem      Cholecystitis








Source of Information: Patient, Nursing Home Records, Old Records, Provider, RN

, RN Notes Reviewed


History Limitations: Reports: No Limitations





- History of Present Illness


Initial Comments - Free Text/Narative: 


Kamla Oakes is an 86 yo female who resides at [facility] nursing home who presented to ED today, 5/15/2020, with complaints of abdominal pain 

that radiated to her back.  She reports symptoms started earlier after 

ingesting [place] sandwich.  Denies any fever, chills, cough, chest pain, 

shortness of breath, nausea, vomiting.  She was seen in our ED 5 days prior for 

constipation and reports she was symptom-free until this morning.  Prior x-rays 

were reviewed by the ED provider and she was noted to have 2 large gallstones 

with no history of gallbladder problems.  She is also noted to have significant 

bilateral lower leg edema with weeping from the right leg.





In the ED she was afebrile with a temp of 98.2 F.  Pulse was 86.  Respirations 

14.  Blood pressure 120/56.  Pulse ox 100% on room air.  Labs are obtained 

showing a leukocytosis with a white count of 15.55.  Hemoglobin is low at 11.1.

  Hematocrit 34.3.  She is macrocytic.  Platelets are high at 390.  Neutrophils 

are elevated at 89.7.  Sodium is low at 129.  Potassium is in the high end of 

normal at 5.0.  Anion gap is high at 16.0.  BUN is 87.  Creatinine is high at 

2.5.  EGFR is 18.  Glucose is high at 169.  Calcium 9.0.  Bilirubin 0.4.  AST 

is 33, ALT 51, alkaline phosphatase 102.  Troponin is normal at 0.024.  proBNP 

is elevated at 1453.  Protein is good at 6.5.  Albumin is low at 3.2.  Lipase 

is normal at 80.  UA is negative.  Chest x-ray is obtained and shows nothing 

acute.  Right upper quadrant abdominal ultrasound is obtained interpreted by 

Dr. Harris as "1.  Somewhat less than optimal study as noted above. 2.  Several 

large shadowing gallstones within the gallbladder.  Sludge ball as well as 

several fixed intraluminal findings either due to polyps or adherent sludge. 3.

  No discrete gallbladder wall thickening or biliary duct dilation is seen. 4.  

2 small cyst within the right kidney." 





She carries a history of: Significant pedal edema, iron deficiency anemia, 

hypokalemia, lymphedema, GERD, chronic constipation, A. fib, osteoarthritis, 

right-sided breast cancer, generalized weakness, CKD, CHF, obesity, 

hypomagnesemia, chronic back and shoulder pain, HLD, gout.  She is a DNR/DNI.  

She is a non-smoker.  Her PCP is Dr. Horta.  She subsequent admitted to the 

medical floor for management of her right upper quadrant abdominal pain, 

cholelithiasis, and significant bilateral pedal edema.





  ** Abdominal


Pain Score (Numeric/FACES): 8





- Related Data


Allergies/Adverse Reactions: 


 Allergies











Allergy/AdvReac Type Severity Reaction Status Date / Time


 


codeine Allergy Intermediate Rash Verified 05/15/20 14:18


 


iodine Allergy Intermediate Rash Verified 05/15/20 14:18


 


metoprolol [From Toprol XL] Allergy Intermediate swelling Verified 05/15/20 14:

18





   of lips  


 


naproxen [From Naprelan] Allergy Intermediate Rash Verified 05/15/20 14:18


 


prednisone Allergy Mild Other Verified 05/15/20 14:18


 


propoxyphene AdvReac Mild urine Verified 05/15/20 14:18





   retention  











Home Medications: 


 Home Meds





Acetaminophen [Tylenol] 325 mg PO Q6HR PRN 12/23/18 [History]


Bumetanide [Bumex] 2 mg PO DAILY 12/23/18 [History]


Loratadine 10 mg PO DAILY PRN 12/23/18 [History]


Multivitamin [Multi-Vitamin Daily] 1 tab PO DAILY 12/23/18 [History]


Simvastatin 20 mg PO BEDTIME 12/23/18 [History]


allopurinoL [Zyloprim] 100 mg PO BID 12/23/18 [History]


traMADol [Ultram] 50 mg PO BID PRN 12/23/18 [History]


Amiodarone [Cordarone] 200 mg PO DAILY #30 tab 12/27/18 [Rx]


Apixaban [Eliquis] 2.5 mg PO BID #30 12/27/18 [Rx]


Acetaminophen [Tylenol] 650 mg PO QID 04/15/19 [History]


Famotidine [Pepcid] 20 mg PO DAILY 04/15/19 [History]


Sennosides/Docusate Sodium [Senna Plus Tablet] 1 tab PO BID PRN 04/15/19 [

History]


Bumetanide 1 mg PO QPM 12/17/19 [History]


Niacin (Inositol Niacinate) [Niacin Flush Free 500 mg Cap] 400 mg PO DAILY 12/17 /19 [History]


Polyvinyl Alcohol/Povidone/Pf [Refresh Classic Eye Drops] 1 each EYEBOTH TID 12/ 17/19 [History]


Potassium Chloride 10 meq PO DAILY 12/17/19 [History]


Sennosides/Docusate Sodium [Senna Plus 8.6-50 mg Tablet] 1 tab PO BID 12/17/19 [

History]


guaiFENesin [Mucinex] 600 mg PO BID 12/17/19 [History]


oxyCODONE 5 mg PO BID 12/17/19 [History]


oxyCODONE 5 mg PO Q4HR PRN 12/17/19 [History]


polyethylene glycoL 3350 [MiraLAX] 17 gm PO DAILY PRN 12/17/19 [History]


Bisacodyl [Laxative Suppository] 10 mg RC ONETIME PRN 05/10/20 [History]


Ipratropium/Albuterol Sulfate [Iprat-Albut 0.5-3(2.5) MG/3 ML] 3 ml IH QID PRN 

05/10/20 [History]


Iron Ag,Ps/C/Fa6/B12/Zn/SA/Sto [Niferex Tablet] 1 each PO BID 05/10/20 [History]


Spironolactone [Aldactone] 25 mg PO DAILY 05/10/20 [History]











Past Medical History


HEENT History: Reports: Hard of Hearing, Impaired Vision, Other (See Below)


Other HEENT History: hearing aid to left ear, wears glasses for reading


Cardiovascular History: Reports: Afib, High Cholesterol, Hypertension


Other Cardiovascular History: edema,


Respiratory History: Reports: Pneumonia, Recurrent


Gastrointestinal History: Reports: GERD


Other Gastrointestinal History: nutritional deficiency, dysphagia,


Genitourinary History: Reports: Chronic Renal Insuffiency


OB/GYN History: Reports: Pregnancy


Musculoskeletal History: Reports: Gout, Osteoarthritis


Other Musculoskeletal History: Chronic bilateral shoulder and back pain, 

chronic bilateral knee pain, multiple rib fx's L side, muscle weakness


Neurological History: Reports: None, Vertigo


Other Neuro History: dizziness


Other Psychiatric History: "Poisoning by cardiac-stimulant glycosides and drugs 

of similar action, accidental, subsequent encounter-digoxin toxicity"


Endocrine/Metabolic History: Reports: Hypokalemia, Hypomagnesemia, Obesity/BMI 

30+


Other Endocrine/Metabolic History: gout, lymphedema


Hematologic History: Reports: Anemia, Other (See Below)


Other Hematologic History: lymphodema


Immunologic History: Reports: None


Oncologic (Cancer) History: Reports: Breast


Other Oncologic History: potential breast cancer, recent diagnossis.  breast 

cancer and is to start radiation


Dermatologic History: Reports: Other (See Below)


Other Dermatologic History: cellulitis to leg right, wound to L arm,





- Infectious Disease History


Infectious Disease History: Reports: Chicken Pox, Measles, Shingles





- Past Surgical History


HEENT Surgical History: Reports: Cataract Surgery


Respiratory Surgical History: Reports: None


Endocrine Surgical History: Reports: None


Neurological Surgical History: Reports: None


Oncologic Surgical History: Reports: Biopsy of Breast, Lumpectomy





Social & Family History





- Family History


Family Medical History: Noncontributory





- Tobacco Use


Smoking Status *Q: Never Smoker


Second Hand Smoke Exposure: No





- Caffeine Use


Caffeine Use: Reports: None





- Living Situation & Occupation


Living situation: Reports: , Extended Care Facility ([facility])


Occupation: Retired





H&P Review of Systems





- Review of Systems:


Review Of Systems: See Below


General: Reports: No Symptoms, Weakness.  Denies: Fever, Chills, Malaise, 

Fatigue


HEENT: Reports: No Symptoms.  Denies: Headaches, Sore Throat


Pulmonary: Denies: Shortness of Breath, Wheezing, Cough, Sputum


Cardiovascular: Reports: Dyspnea on Exertion, Orthopnea, Edema.  Denies: Chest 

Pain, Palpitations, Lightheadedness, Syncope


Gastrointestinal: Reports: Abdominal Pain (RUQ ).  Denies: Constipation, 

Diarrhea, Flatus, Hematemesis, Hematochezia, Nausea, Vomiting


Genitourinary: Reports: No Symptoms.  Denies: Pain


Musculoskeletal: Reports: Shoulder Pain (chronic ), Back Pain (chronic )


Skin: Reports: Bruising (scattered ).  Denies: Cyanosis


Psychiatric: Reports: No Symptoms


Neurological: Reports: Difficulty Walking, Weakness, Gait Disturbance.  Denies: 

Confusion


Hematologic/Lymphatic: Reports: No Symptoms


Immunologic: Reports: No Symptoms





Exam





- Exam


Exam: See Below





- Vital Signs


Vital Signs: 


 Last Vital Signs











Temp  98.2 F   05/15/20 10:41


 


Pulse  86   05/15/20 13:11


 


Resp  14   05/15/20 13:11


 


BP  120/56 L  05/15/20 13:11


 


Pulse Ox  100   05/15/20 13:11











Weight: 147 lb





- Exam


Quality Assessment: DVT Prophylaxis.  No: Supplemental Oxygen


General: Alert, Oriented, Cooperative.  No: Mild Distress


HEENT: Conjunctiva Clear, EOMI, Mucosa Moist & [place], PERRLA


Neck: Supple, Trachea Midline


Lungs: Clear to Auscultation, Normal Respiratory Effort


Cardiovascular: Regular Rate, Regular Rhythm


GI/Abdominal Exam: Normal Bowel Sounds, Soft, No Distention, Tender (RUQ)


 (Female) Exam: Deferred


Rectal (Female) Exam: Deferred


Back Exam: Normal Inspection, Decreased Range of Motion, Other (Kyphosis )


Extremities: Normal Range of Motion, Pedal Edema (Significant bilateral with 

drainage from right leg), Leg Pain.  No: Increased Warmth, Redness


Skin: Warm, Dry, Intact


Neurological: Cranial Nerves Intact (Grossly )


Neuro Extensive - Mental Status: Alert, Oriented x3, Normal Mood/Affect





- Patient Data


Lab Results Last 24 hrs: 


 Laboratory Results - last 24 hr











  05/15/20 05/15/20 05/15/20 Range/Units





  11:00 11:00 11:00 


 


WBC  15.55 H    (3.98-10.04)  K/mm3


 


RBC  3.35 L    (3.98-5.22)  M/mm3


 


Hgb  11.1 L    (11.2-15.7)  gm/dl


 


Hct  34.3    (34.1-44.9)  %


 


MCV  102.4 H    (79.4-94.8)  fl


 


MCH  33.1 H    (25.6-32.2)  pg


 


MCHC  32.4    (32.2-35.5)  g/dl


 


RDW Std Deviation  50.2 H    (36.4-46.3)  fL


 


Plt Count  390 H D    (182-369)  K/mm3


 


MPV  9.6    (9.4-12.3)  fl


 


Neut % (Auto)  89.7 H    (34.0-71.1)  %


 


Lymph % (Auto)  2.3 L    (19.3-51.7)  %


 


Mono % (Auto)  7.1    (4.7-12.5)  %


 


Eos % (Auto)  0 L    (0.7-5.8)  


 


Baso % (Auto)  0.0 L    (0.1-1.2)  %


 


Neut # (Auto)  13.95 H    (1.56-6.13)  K/mm3


 


Lymph # (Auto)  0.35 L    (1.18-3.74)  K/mm3


 


Mono # (Auto)  1.11 H    (0.24-0.36)  K/mm3


 


Eos # (Auto)  0.00 L    (0.04-0.36)  K/mm3


 


Baso # (Auto)  0.00 L    (0.01-0.08)  K/mm3


 


Manual Slide Review  Abnormal smear    


 


Sodium   129 L   (136-145)  mEq/L


 


Potassium   5.0   (3.5-5.1)  mEq/L


 


Chloride   92 L   ()  mEq/L


 


Carbon Dioxide   26   (21-32)  mEq/L


 


Anion Gap   16.0 H   (5-15)  


 


BUN   87 H D   (7-18)  mg/dL


 


Creatinine   2.5 H   (0.55-1.02)  mg/dL


 


Est Cr Clr Drug Dosing   13.61   mL/min


 


Estimated GFR (MDRD)   18   (>60)  mL/min


 


BUN/Creatinine Ratio   34.8 H   (14-18)  


 


Glucose   169 H   ()  mg/dL


 


Calcium   9.0   (8.5-10.1)  mg/dL


 


Total Bilirubin   0.4   (0.2-1.0)  mg/dL


 


AST   33   (15-37)  U/L


 


ALT   51   (14-59)  U/L


 


Alkaline Phosphatase   102   ()  U/L


 


Troponin I    0.024  (0.00-0.056)  ng/mL


 


NT-Pro-B Natriuret Pep     (0-450)  pg/mL


 


Total Protein   6.5   (6.4-8.2)  g/dl


 


Albumin   3.2 L   (3.4-5.0)  g/dl


 


Globulin   3.3   gm/dL


 


Albumin/Globulin Ratio   1.0   (1-2)  


 


Lipase   80   ()  U/L


 


Urine Color     (Yellow)  


 


Urine Appearance     (Clear)  


 


Urine pH     (5.0-8.0)  


 


Ur Specific Gravity     (1.005-1.030)  


 


Urine Protein     (Negative)  


 


Urine Glucose (UA)     (Negative)  


 


Urine Ketones     (Negative)  


 


Urine Occult Blood     (Negative)  


 


Urine Nitrite     (Negative)  


 


Urine Bilirubin     (Negative)  


 


Urine Urobilinogen     (0.2-1.0)  


 


Ur Leukocyte Esterase     (Negative)  


 


Urine RBC     (0-5)  /hpf


 


Urine WBC     (0-5)  /hpf


 


Ur Squamous Epith Cells     (0-5)  /hpf


 


Urine Bacteria     (FEW)  /hpf


 


Urine Mucus     (FEW)  /hpf














  05/15/20 05/15/20 Range/Units





  11:00 12:10 


 


WBC    (3.98-10.04)  K/mm3


 


RBC    (3.98-5.22)  M/mm3


 


Hgb    (11.2-15.7)  gm/dl


 


Hct    (34.1-44.9)  %


 


MCV    (79.4-94.8)  fl


 


MCH    (25.6-32.2)  pg


 


MCHC    (32.2-35.5)  g/dl


 


RDW Std Deviation    (36.4-46.3)  fL


 


Plt Count    (182-369)  K/mm3


 


MPV    (9.4-12.3)  fl


 


Neut % (Auto)    (34.0-71.1)  %


 


Lymph % (Auto)    (19.3-51.7)  %


 


Mono % (Auto)    (4.7-12.5)  %


 


Eos % (Auto)    (0.7-5.8)  


 


Baso % (Auto)    (0.1-1.2)  %


 


Neut # (Auto)    (1.56-6.13)  K/mm3


 


Lymph # (Auto)    (1.18-3.74)  K/mm3


 


Mono # (Auto)    (0.24-0.36)  K/mm3


 


Eos # (Auto)    (0.04-0.36)  K/mm3


 


Baso # (Auto)    (0.01-0.08)  K/mm3


 


Manual Slide Review    


 


Sodium    (136-145)  mEq/L


 


Potassium    (3.5-5.1)  mEq/L


 


Chloride    ()  mEq/L


 


Carbon Dioxide    (21-32)  mEq/L


 


Anion Gap    (5-15)  


 


BUN    (7-18)  mg/dL


 


Creatinine    (0.55-1.02)  mg/dL


 


Est Cr Clr Drug Dosing    mL/min


 


Estimated GFR (MDRD)    (>60)  mL/min


 


BUN/Creatinine Ratio    (14-18)  


 


Glucose    ()  mg/dL


 


Calcium    (8.5-10.1)  mg/dL


 


Total Bilirubin    (0.2-1.0)  mg/dL


 


AST    (15-37)  U/L


 


ALT    (14-59)  U/L


 


Alkaline Phosphatase    ()  U/L


 


Troponin I    (0.00-0.056)  ng/mL


 


NT-Pro-B Natriuret Pep  1453 H   (0-450)  pg/mL


 


Total Protein    (6.4-8.2)  g/dl


 


Albumin    (3.4-5.0)  g/dl


 


Globulin    gm/dL


 


Albumin/Globulin Ratio    (1-2)  


 


Lipase    ()  U/L


 


Urine Color   Yellow  (Yellow)  


 


Urine Appearance   Clear  (Clear)  


 


Urine pH   6.0  (5.0-8.0)  


 


Ur Specific Gravity   1.020  (1.005-1.030)  


 


Urine Protein   Negative  (Negative)  


 


Urine Glucose (UA)   Negative  (Negative)  


 


Urine Ketones   Negative  (Negative)  


 


Urine Occult Blood   Negative  (Negative)  


 


Urine Nitrite   Negative  (Negative)  


 


Urine Bilirubin   Negative  (Negative)  


 


Urine Urobilinogen   0.2  (0.2-1.0)  


 


Ur Leukocyte Esterase   Negative  (Negative)  


 


Urine RBC   Not seen  (0-5)  /hpf


 


Urine WBC   0-5  (0-5)  /hpf


 


Ur Squamous Epith Cells   0-5  (0-5)  /hpf


 


Urine Bacteria   Not seen  (FEW)  /hpf


 


Urine Mucus   Not seen  (FEW)  /hpf











Result Diagrams: 


 05/15/20 11:00





 05/15/20 11:00





Sepsis Event Note





- Evaluation


Sepsis Screening Result: No Definite Risk





- Focused Exam


Vital Signs: 


 Vital Signs











  Temp Pulse Resp BP Pulse Ox


 


 05/15/20 13:11   86  14  120/56 L  100


 


 05/15/20 10:41  98.2 F  86  16  146/64 H  99











Date Exam was Performed: 05/15/20


Time Exam was Performed: 15:45





- Problem List


(1) CHF (congestive heart failure)


SNOMED Code(s): 63101685


   ICD Code: I50.9 - HEART FAILURE, UNSPECIFIED   Status: Acute   Priority: 

High   Current Visit: Yes   


Qualifiers: 


   Heart failure type: other   Qualified Code(s): I50.9 - Heart failure, 

unspecified   





(2) Cholelithiases


SNOMED Code(s): 066090064


   ICD Code: K80.20 - CALCULUS OF GALLBLADDER W/O CHOLECYSTITIS W/O OBSTRUCTION

   Status: Acute   Priority: High   Current Visit: Yes   


Qualifiers: 


   Cholelithiasis location: gallbladder   Cholecystitis presence: without 

cholecystitis   Biliary obstruction: without biliary obstruction   Qualified 

Code(s): K80.20 - Calculus of gallbladder without cholecystitis without 

obstruction   





(3) Hyponatremia


SNOMED Code(s): 57352637


   ICD Code: E87.1 - HYPO-OSMOLALITY AND HYPONATREMIA   Status: Acute   Priority

: High   Current Visit: Yes   





(4) Peripheral edema


SNOMED Code(s): 484483835


   ICD Code: R60.9 - EDEMA, UNSPECIFIED   Status: Chronic   Priority: High   

Current Visit: Yes   





(5) Renal insufficiency


SNOMED Code(s): 909773487, 600093008


   ICD Code: N28.9 - DISORDER OF KIDNEY AND URETER, UNSPECIFIED   Status: Acute

   Priority: High   Current Visit: Yes   





(6) Lymphedema


SNOMED Code(s): 312805275


   ICD Code: I89.0 - LYMPHEDEMA, NOT ELSEWHERE CLASSIFIED   Status: Chronic   

Priority: High   Current Visit: Yes   





(7) GERD (gastroesophageal reflux disease)


SNOMED Code(s): 351273859


   ICD Code: K21.9 - GASTRO-ESOPHAGEAL REFLUX DISEASE WITHOUT ESOPHAGITIS   

Status: Chronic   Priority: Low   Current Visit: No   


Qualifiers: 


   Esophagitis presence: esophagitis presence not specified   Qualified Code(s)

: K21.9 - Gastro-esophageal reflux disease without esophagitis   





(8) Chronic constipation


SNOMED Code(s): 377013898


   ICD Code: K59.09 - OTHER CONSTIPATION   Status: Chronic   Priority: Low   

Current Visit: No   





(9) Osteoarthritis


SNOMED Code(s): 892723795


   ICD Code: M19.90 - UNSPECIFIED OSTEOARTHRITIS, UNSPECIFIED SITE   Status: 

Chronic   Priority: Low   Current Visit: No   


Qualifiers: 


   Osteoarthritis location: multiple joints   Osteoarthritis type: primary   

Qualified Code(s): M89.49 - Other hypertrophic osteoarthropathy, multiple sites

   





(10) History of breast cancer


SNOMED Code(s): 923483787


   ICD Code: Z85.3 - PERSONAL HISTORY OF MALIGNANT NEOPLASM OF BREAST   Status: 

Chronic   Priority: Medium   Current Visit: No   





(11) Generalized weakness


SNOMED Code(s): 32062378


   ICD Code: R53.1 - WEAKNESS   Status: Chronic   Priority: Medium   Current 

Visit: No   





(12) Gout


SNOMED Code(s): 58344843


   ICD Code: M10.9 - GOUT, UNSPECIFIED   Status: Chronic   Priority: Low   

Current Visit: No   


Qualifiers: 


   Gout site: unspecified site   Gout etiology: unspecified cause   Chronicity: 

chronic   Presence of tophus: without tophus   Qualified Code(s): M1A.9XX0 - 

Chronic gout, unspecified, without tophus (tophi)   





(13) Chronic back pain


SNOMED Code(s): 353893490


   ICD Code: M54.9 - DORSALGIA, UNSPECIFIED; G89.29 - OTHER CHRONIC PAIN   

Status: Chronic   Priority: Low   Current Visit: No   


Qualifiers: 


   Back pain location: back pain in unspecified location   Back pain laterality

: unspecified   Qualified Code(s): M54.9 - Dorsalgia, unspecified; G89.29 - 

Other chronic pain   





(14) Chronic shoulder pain


SNOMED Code(s): 58390103


   ICD Code: M25.519 - PAIN IN UNSPECIFIED SHOULDER; G89.29 - OTHER CHRONIC 

PAIN   Status: Chronic   Priority: Low   Current Visit: No   


Qualifiers: 


   Laterality: unspecified laterality   Qualified Code(s): M25.519 - Pain in 

unspecified shoulder; G89.29 - Other chronic pain   





(15) Hypertension


SNOMED Code(s): 77369920


   ICD Code: I10 - ESSENTIAL (PRIMARY) HYPERTENSION   Status: Chronic   Priority

: Low   Current Visit: No   


Qualifiers: 


   Hypertension type: unspecified   Qualified Code(s): I10 - Essential (primary

) hypertension   





(16) HLD (hyperlipidemia)


SNOMED Code(s): 13730775


   ICD Code: E78.5 - HYPERLIPIDEMIA, UNSPECIFIED   Status: Chronic   Priority: 

Low   Current Visit: Yes   


Qualifiers: 


   Hyperlipidemia type: unspecified   Qualified Code(s): E78.5 - Hyperlipidemia

, unspecified   





(17) Anemia


SNOMED Code(s): 136836452


   ICD Code: D64.9 - ANEMIA, UNSPECIFIED   Status: Acute   Priority: High   

Current Visit: No   


Qualifiers: 


   Anemia type: iron deficiency   Iron deficiency anemia type: unspecified iron 

deficiency   Qualified Code(s): D50.9 - Iron deficiency anemia, unspecified   





(18) Chronic renal insufficiency, stage IV (severe)


SNOMED Code(s): 821294685


   ICD Code: N18.4 - CHRONIC KIDNEY DISEASE, STAGE 4 (SEVERE)   Status: Chronic

   Priority: Medium   Current Visit: No   





(19) Paroxysmal atrial fibrillation


SNOMED Code(s): 027185167


   ICD Code: I48.0 - PAROXYSMAL ATRIAL FIBRILLATION   Status: Chronic   Priority

: Low   Current Visit: No   





(20) Hypomagnesemia


SNOMED Code(s): 330397302


   ICD Code: E83.42 - HYPOMAGNESEMIA   Status: Acute   Priority: High   Current 

Visit: Yes   





(21) Abdominal pain


SNOMED Code(s): 15736392


   ICD Code: R10.9 - UNSPECIFIED ABDOMINAL PAIN   Status: Acute   Priority: 

High   Current Visit: Yes   


Qualifiers: 


   Abdominal location: right upper quadrant   Qualified Code(s): R10.11 - Right 

upper quadrant pain   





(22) Leukocytosis


SNOMED Code(s): 510078465, 583542854


   ICD Code: D72.829 - ELEVATED WHITE BLOOD CELL COUNT, UNSPECIFIED   Status: 

Acute   Priority: High   Current Visit: Yes   


Qualifiers: 


   Leukocytosis type: unspecified   Qualified Code(s): D72.829 - Elevated white 

blood cell count, unspecified   


Problem List Initiated/Reviewed/Updated: Yes


Orders Last 24hrs: 


 Active Orders 24 hr











 Category Date Time Status


 


 Admission Status [Patient Status] [ADT] Routine ADT  05/15/20 13:27 Active


 


 EKG Documentation Completion [RC] ASDIRECTED Care  05/15/20 11:48 Active


 


 Peripheral IV Care [RC] .AS DIRECTED Care  05/15/20 10:48 Active


 


 MAGNESIUM [CHEM] Stat Lab  05/15/20 11:00 Received


 


 PHOSPHORUS [CHEM] Stat Lab  05/15/20 11:00 Received


 


 Sodium Chloride 0.9% [Saline Flush] Med  05/15/20 10:47 Active





 10 ml FLUSH ASDIRECTED PRN   


 


 Peripheral IV Insertion Adult [OM.PC] Stat Oth  05/15/20 10:47 Ordered


 


 EKG 12 Lead [EK] Stat Ther  05/15/20 11:48 Ordered








 Medication Orders





Sodium Chloride (Saline Flush)  10 ml FLUSH ASDIRECTED PRN


   PRN Reason: Keep Vein Open


   Last Admin: 05/15/20 11:26  Dose: 10 ml








Assessment/Plan Comment:: 


Abdominal pain


Cholelithiases


Leukocytosis


Presented to ED after eating [employer] and noticing sudden onset RUQ pain


Denies fever, nausea, vomiting, chills, or other infectious symptoms


Gallstones noted on visit from 5 days prior


Abdominal US shows large shadowing gallstones, sludge ball, fixed intraluminal 

findings but no wall thickening or duct dilation


WBC elevated at 15.55


AST, ALT, Alk Phos, Bilirubin, Lipase - all WNL


PLAN


-Start Zosyn 


-Consult Dr. Sr, General surgeon


-Obtain GGT


-Obtain procalcitonin


-Culture blood


-Consider ursodiol


-Caution with opioids 





CHF (congestive heart failure)


Peripheral edema


Renal insufficiency


Lymphedema


History of breast cancer


Hyponatremia


Chronic renal insufficiency, stage IV (severe)


Paroxysmal atrial fibrillation


Hypertension


HLD (hyperlipidemia)


Hypomagnesemia


History of lymphedema and significant bilateral pedal edema


On bumex 2mg daily and 1mg at night, 25mg spironolactone


Magnesium 1.7


Sodium 129


BUN 87, Creatinine 2.5, GFR 18 on admission


History of A-fib on Eliquis


Last echo from 12/26/18: EV 70-75%


   -Normal RV systolic function


   -Mild aortic valve sclerosis without stenosis


   -Mild MV and TV regurgitation


   -Mod elevated RV systolic pressure at 51.2 mmHg


   -No regional wall motion abnormalities 


BNP in ED 1453


PLAN


-Obtain echo


-Calculate FENa


-Fluid restriction


-ACE wrap bilateral legs during day, off at night


-Elevate extremities whenever able


-Bumex 1mg BID 


-Metolazone 5mg single dose today 30 mins prior to Bumex


-Monitor renal function


-Supplement magnesium


-Monitor electrolytes





GERD (gastroesophageal reflux disease)


Chronic constipation


PLAN


-Home medications as ordered 





Gout


Anemia


Plan


-Monitor CBC


-Home medications as ordered 





Chronic back pain


Chronic shoulder pain


Chronic Generalized weakness


Osteoarthritis


PLAN


-PT/OT


-Pain medications as directed





DVT prophylaxis: Home Eliquis





GI Prophylaxis: Home Pepcid





Social: Patient lives at [facility]. Patient is listed as palliative care on 

SNF sheet. 





Code status: DNR/DNI 





PCP: Dr. Horta





Disposition: Patient will be admitted to hospital floor for management of 

abdominal pain, Cholelithiasis workup, diuresis. Discharge plan pending Dr. Sr recommendations, lab results. 





- Mortality Measure


Prognosis:: Poor (Overall poor prognosis given advanced age and significant 

comorbidities)

## 2020-06-17 NOTE — EDM.PDOC
ED HPI GENERAL MEDICAL PROBLEM





- General


Chief Complaint: Cardiovascular Problem


Stated Complaint: [facility]


Time Seen by Provider: 06/17/20 11:20


Source of Information: Reports: Patient, EMS, Provider


History Limitations: Reports: No Limitations





- History of Present Illness


INITIAL COMMENTS - FREE TEXT/NARRATIVE: 





The patient presents from [facility] for low heart rate.  She was going to 

see Dr Allison for possible dialysis.  They did vital signs and her heart rate 

was in the 30s.  She was sent over by ambulance.  Her heart rate when they got 

there was in the 110s and the same here.  She admits to having chest pain for 

the past month.  She also has chronic edema of both legs.  She has Ace wraps on 

both legs now.  She denies fever, chills, cough, shortness of breath, abdominal 

pain, nausea or vomiting.  She is a resident of [facility].


Onset: Sudden


Duration: Minutes:


Location: Reports: Chest


Quality: Reports: Sharp


Severity: Moderate


Improves with: Reports: None


Worsens with: Reports: None


Associated Symptoms: Reports: Chest Pain.  Denies: Cough, Fever/Chills, 

Headaches, Nausea/Vomiting, Shortness of Breath


Treatments PTA: Reports: Other (see below)


Other Treatments PTA: none


  ** Chest


Pain Score (Numeric/FACES): 7





- Related Data


                                    Allergies











Allergy/AdvReac Type Severity Reaction Status Date / Time


 


codeine Allergy Severe Rash Verified 06/17/20 11:31


 


iodine Allergy Severe Rash Verified 06/17/20 11:31


 


metoprolol [From Toprol XL] Allergy Severe swelling Verified 06/17/20 11:31





   of lips  


 


naproxen [From Naprelan] Allergy Severe Rash Verified 06/17/20 11:31


 


prednisone Allergy Severe Other Verified 06/17/20 11:31


 


propoxyphene AdvReac Severe urine Verified 06/17/20 11:31





   retention  











Home Meds: 


                                    Home Meds





Acetaminophen [Tylenol] 325 mg PO Q6HR PRN 12/23/18 [History]


Bumetanide [Bumex] 4 mg PO BID 12/23/18 [History]


Simvastatin 20 mg PO BEDTIME 12/23/18 [History]


allopurinoL [Zyloprim] 100 mg PO BID 12/23/18 [History]


Amiodarone [Cordarone] 200 mg PO DAILY #30 tab 12/27/18 [Rx]


Acetaminophen [Tylenol] 650 mg PO QID 04/15/19 [History]


Famotidine [Pepcid] 20 mg PO DAILY 04/15/19 [History]


Sennosides/Docusate Sodium [Senna Plus Tablet] 1 tab PO BID PRN 04/15/19 

[History]


Niacin (Inositol Niacinate) [Niacin Flush Free 500 mg Cap] 400 mg PO DAILY 

12/17/19 [History]


Polyvinyl Alcohol/Povidone/Pf [Refresh Classic Eye Drops] 1 each EYEBOTH TID 

12/17/19 [History]


Sennosides/Docusate Sodium [Senna Plus 8.6-50 mg Tablet] 1 tab PO BID 12/17/19 

[History]


guaiFENesin [Mucinex] 600 mg PO BID 12/17/19 [History]


oxyCODONE 5 mg PO BID 12/17/19 [History]


oxyCODONE 5 mg PO Q4HR PRN 12/17/19 [History]


polyethylene glycoL 3350 [MiraLAX] 17 gm PO DAILY PRN 12/17/19 [History]


Ipratropium/Albuterol Sulfate [Iprat-Albut 0.5-3(2.5) MG/3 ML] 3 ml IH BID 

05/10/20 [History]


Iron Ag,Ps/C/Fa6/B12/Zn/SA/Sto [Niferex Tablet] 1 each PO BID 05/10/20 [History]


Spironolactone [Aldactone] 12.5 mg PO BID 05/10/20 [History]


Ascorbic Acid 500 mg PO BID 05/15/20 [History]


Calcium Carbonate [Tums] 200 mg PO Q6HR PRN 06/17/20 [History]


Sertraline [Zoloft] 25 mg PO DAILY 06/17/20 [History]


bisacodyL [Dulcolax] 10 mg RECTAL DAILY PRN 06/17/20 [History]


fentaNYL [Fentanyl] 12 mcg TRDERM ASDIRECTED 06/17/20 [History]


metOLazone [Metolazone] 5 mg PO BID 06/17/20 [History]











Past Medical History


HEENT History: Reports: Hard of Hearing, Impaired Vision, Other (See Below)


Other HEENT History: hearing aid to left ear, wears glasses for reading


Cardiovascular History: Reports: Afib, High Cholesterol, Hypertension


Other Cardiovascular History: edema generalized, HF, HTN with HF, dizziness, 

giddiness, cardiac arrythmia, anemia, poisoning of cardiac stimulant glycosides 

and drugs wtih similar action accidental digoxin toxicity, localized peripheral 

edema


Respiratory History: Reports: Pneumonia, Recurrent


Other Respiratory History: mycoplasma pneumoniae


Gastrointestinal History: Reports: GERD


Other Gastrointestinal History: nutritional deficiency, dysphagia, pharyngeal 

phase


Genitourinary History: Reports: Chronic Renal Insuffiency


Other Genitourinary History: hypokalemia, CKD stage III, hypomagnesemia


OB/GYN History: Reports: Pregnancy


Musculoskeletal History: Reports: Gout, Osteoarthritis


Other Musculoskeletal History: Chronic bilateral shoulder and back pain, chronic

 bilateral knee pain, multiple rib fx's L side, muscle weakness, unspecified 

abnormality of gait and mobility, muscle weakness, need for assistance with 

personal care., gout,


Neurological History: Reports: Vertigo


Other Neuro History: dizziness


Other Psychiatric History: "Poisoning by cardiac-stimulant glycosides and drugs 

of similar action, accidental, subsequent encounter-digoxin toxicity"


Endocrine/Metabolic History: Reports: Hypokalemia, Hypomagnesemia, Obesity/BMI 

30+


Other Endocrine/Metabolic History: gout, lymphedema, obesity, drug induced 

constipation


Hematologic History: Reports: Anemia, Iron Deficiency, Other (See Below)


Other Hematologic History: lymphedema


Immunologic History: Reports: None


Other Immunologic History: Allergy unsecified initial encounter


Oncologic (Cancer) History: Reports: Breast


Other Oncologic History: potential breast cancer, recent diagnossis.  breast 

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 

breast.


Dermatologic History: Reports: Other (See Below)


Other Dermatologic History: cellulitis to leg right lower limb, wound to L arm, 

open wound to left forearm





- Infectious Disease History


Infectious Disease History: Reports: Chicken Pox, Measles, Shingles





- Past Surgical History


HEENT Surgical History: Reports: Cataract Surgery


Respiratory Surgical History: Reports: None


Endocrine Surgical History: Reports: None


Neurological Surgical History: Reports: None


Oncologic Surgical History: Reports: Biopsy of Breast, Lumpectomy





Social & Family History





- Family History


Family Medical History: Noncontributory





- Tobacco Use


Smoking Status *Q: Never Smoker





- Caffeine Use


Caffeine Use: Reports: Tea


Other Caffeine Use: rarely.





- Recreational Drug Use


Recreational Drug Use: No





- Living Situation & Occupation


Living situation: Reports: , Extended Care Facility ([facility])


Occupation: Retired





ED ROS GENERAL





- Review of Systems


Review Of Systems: See Below


Constitutional: Reports: No Symptoms


HEENT: Reports: No Symptoms


Respiratory: Reports: No Symptoms


Cardiovascular: Reports: Chest Pain, Other (bradycardia)


Endocrine: Reports: No Symptoms


GI/Abdominal: Reports: No Symptoms


: Reports: No Symptoms


Musculoskeletal: Reports: No Symptoms





ED EXAM, GENERAL





- Physical Exam


Exam: See Below


Exam Limited By: No Limitations


General Appearance: Alert, No Apparent Distress


Ears: Normal External Exam


Nose: Normal Inspection


Head: Atraumatic, Normocephalic


Neck: Normal Inspection


Respiratory/Chest: No Respiratory Distress, Lungs Clear, Normal Breath Sounds


Cardiovascular: No Edema, No Murmur, Tachycardia


GI/Abdominal: Soft, Non-Tender, No Organomegaly, No Mass


Extremities: Other (Ecchymosis to both arms.  Both legs are wrapped with Ace 

wraps)


Neurological: Alert, Oriented, No Motor/Sensory Deficits





EKG INTERPRETATION


EKG Date: 06/17/20


Time: 11:19


Rhythm: Other (sinus tachycardia)


Rate (Beats/Min): 117


Axis: Normal


P-Wave: Present


QRS: LBBB


WV/PQ Interval: 1st degree HB





Course





- Vital Signs


Last Recorded V/S: 


                                Last Vital Signs











Temp  97.5 F   06/17/20 11:22


 


Pulse  117 H  06/17/20 11:22


 


Resp  20   06/17/20 11:22


 


BP  96/75   06/17/20 11:22


 


Pulse Ox  97   06/17/20 11:22














- Orders/Labs/Meds


Orders: 


                               Active Orders 24 hr











 Category Date Time Status


 


 Cardiac Monitoring [RC] .AS DIRECTED Care  06/17/20 11:39 Active


 


 EKG Documentation Completion [RC] STAT Care  06/17/20 11:23 Active


 


 Oxygen Therapy [RC] PRN Care  06/17/20 11:40 Active


 


 Peripheral IV Care [RC] .AS DIRECTED Care  06/17/20 11:41 Active


 


 CORONAVIRUS COVID-19 JAIDEN [MOLEC] Stat Lab  06/17/20 13:07 Received


 


 CULTURE BLOOD [BC] Stat Lab  06/17/20 12:40 Received


 


 CULTURE BLOOD [BC] Stat Lab  06/17/20 13:00 Received


 


 LACTIC ACID W/ REFLEX [LACTATE SEPSIS W/ REFLEX] [CHEM] Lab  06/17/20 12:40 

Received





 Stat   


 


 UA W/MICROSCOPIC [URIN] Stat Lab  06/17/20 13:14 Received


 


 Sodium Chloride 0.9% [Normal Saline] 1,000 ml Med  06/17/20 12:45 Active





 IV ASDIRECTED   


 


 Sodium Chloride 0.9% [Saline Flush] Med  06/17/20 11:39 Active





 10 ml FLUSH ASDIRECTED PRN   


 


 Vancomycin 1.5 gm Med  06/17/20 12:45 Active





 Sodium Chloride 0.9% [Normal Saline] 500 ml   





 IV ONETIME   


 


 cefTRIAXone [Rocephin] 2 gm Med  06/17/20 13:00 Active





 Sodium Chloride 0.9% [Normal Saline] 100 ml   





 IV Q24H   


 


 Blood Culture x2 Reflex Set [OM.PC] Stat Oth  06/17/20 12:23 Ordered


 


 Peripheral IV Insertion Adult [OM.PC] Stat Oth  06/17/20 11:39 Ordered








                                Medication Orders





Vancomycin HCl 1.5 gm/ Sodium (Chloride)  500 mls @ 250 mls/hr IV ONETIME ONE


   Stop: 06/17/20 14:44


Sodium Chloride (Normal Saline)  1,000 mls @ 150 mls/hr IV ASDIRECTED [facility]


   Last Admin: 06/17/20 13:03  Dose: 150 mls/hr


   Documented by: VANESSA


Ceftriaxone Sodium 2 gm/ (Sodium Chloride)  100 mls @ 200 mls/hr IV Q24H [facility]


   Last Admin: 06/17/20 13:03  Dose: 200 mls/hr


   Documented by: VANESSA


Sodium Chloride (Saline Flush)  10 ml FLUSH ASDIRECTED PRN


   PRN Reason: Keep Vein Open


   Last Admin: 06/17/20 12:31  Dose: 10 ml


   Documented by: VANESSA








Labs: 


                                Laboratory Tests











  06/17/20 06/17/20 Range/Units





  11:30 11:30 


 


WBC  26.20 H   (3.98-10.04)  K/mm3


 


RBC  3.71 L   (3.98-5.22)  M/mm3


 


Hgb  12.2   (11.2-15.7)  gm/dl


 


Hct  36.4   (34.1-44.9)  %


 


MCV  98.1 H D   (79.4-94.8)  fl


 


MCH  32.9 H   (25.6-32.2)  pg


 


MCHC  33.5   (32.2-35.5)  g/dl


 


RDW Std Deviation  49.2 H   (36.4-46.3)  fL


 


Plt Count  263  D   (182-369)  K/mm3


 


MPV  10.8   (9.4-12.3)  fl


 


Neut % (Auto)  95.8 H   (34.0-71.1)  %


 


Lymph % (Auto)  1.0 L   (19.3-51.7)  %


 


Mono % (Auto)  2.4 L   (4.7-12.5)  %


 


Eos % (Auto)  0 L   (0.7-5.8)  


 


Baso % (Auto)  0.1   (0.1-1.2)  %


 


Neut # (Auto)  25.12 H   (1.56-6.13)  K/mm3


 


Lymph # (Auto)  0.25 L   (1.18-3.74)  K/mm3


 


Mono # (Auto)  0.62 H   (0.24-0.36)  K/mm3


 


Eos # (Auto)  0.00 L   (0.04-0.36)  K/mm3


 


Baso # (Auto)  0.02   (0.01-0.08)  K/mm3


 


Manual Slide Review  Abnormal smear   


 


Sodium   119 L D  (136-145)  mEq/L


 


Potassium   3.9  (3.5-5.1)  mEq/L


 


Chloride   82 L  ()  mEq/L


 


Carbon Dioxide   21  (21-32)  mEq/L


 


Anion Gap   19.9 H  (5-15)  


 


BUN   103 H  (7-18)  mg/dL


 


Creatinine   3.7 H D  (0.55-1.02)  mg/dL


 


Est Cr Clr Drug Dosing   8.79  mL/min


 


Estimated GFR (MDRD)   12  (>60)  mL/min


 


BUN/Creatinine Ratio   27.8 H  (14-18)  


 


Glucose   163 H  ()  mg/dL


 


Calcium   8.8  (8.5-10.1)  mg/dL


 


Phosphorus   5.9 H  (2.6-4.7)  mg/dL


 


Magnesium   1.6 L  (1.8-2.4)  mg/dl


 


Total Bilirubin   0.6  (0.2-1.0)  mg/dL


 


AST   59 H  (15-37)  U/L


 


ALT   57  (14-59)  U/L


 


Alkaline Phosphatase   127 H  ()  U/L


 


Troponin I   < 0.017  (0.00-0.056)  ng/mL


 


Total Protein   6.3 L  (6.4-8.2)  g/dl


 


Albumin   2.7 L  (3.4-5.0)  g/dl


 


Globulin   3.6  gm/dL


 


Albumin/Globulin Ratio   0.8 L  (1-2)  











Meds: 


Medications











Generic Name Dose Route Start Last Admin





  Trade Name Freq  PRN Reason Stop Dose Admin


 


Vancomycin HCl 1.5 gm/ Sodium  500 mls @ 250 mls/hr  06/17/20 12:45 





  Chloride  IV  06/17/20 14:44 





  ONETIME ONE  


 


Sodium Chloride  1,000 mls @ 150 mls/hr  06/17/20 12:45  06/17/20 13:03





  Normal Saline  IV   150 mls/hr





  ASDIRECTED CHON   Administration


 


Ceftriaxone Sodium 2 gm/  100 mls @ 200 mls/hr  06/17/20 13:00  06/17/20 13:03





  Sodium Chloride  IV   200 mls/hr





  Q24H CHON   Administration


 


Sodium Chloride  10 ml  06/17/20 11:39  06/17/20 12:31





  Saline Flush  FLUSH   10 ml





  ASDIRECTED PRN   Administration





  Keep Vein Open  














Discontinued Medications














Generic Name Dose Route Start Last Admin





  Trade Name Freq  PRN Reason Stop Dose Admin


 


Vancomycin HCl 1.5 gm/ Sodium  250 mls @ 250 mls/hr  06/17/20 12:28 





  Chloride  IV  06/17/20 13:27 





  ONETIME ONE  














- Re-Assessments/Exams


Free Text/Narrative Re-Assessment/Exam: 





06/17/20 12:03


I ordered an IV saline lock, EKG, CXR, and labs.  Her EKG shows sinus 

tachycardia with 1st degree HB and new left bundle branch block.


06/17/20 12:43


Her WBC was elevated at 16.2.  Her Na is low at 119.  Her anion gap was elevated

 at 19.9.  Her BUN is elevated at 103 along with her creatinine of 3.7.  Her GFR

 is low at 12.  Her glucose is elevated at 163.  


06/17/20 13:25


Her magnesium is a little low at 1.6.  Her AST is 59.  Her alk phos is 127.  She

 is in renal failure and she is septic from cellulitis on her right leg.  I had 

my nurse take the wraps off.  I ordered vancomycin 1.5g IV.  I called Dr Allison 

and he recommended she be transferred to [facility] in [place].  Dr Allison was in 

[place] at the clinic so I called [facility] and talked with Dr Win and

 he accepted the patient and he wanted Rocephin added.





Departure





- Departure


Time of Disposition: 13:35


Disposition: DC/Tfer to [facility]


Reason for Transfer *Q: Other


Condition: Fair


Clinical Impression: 


 New onset left bundle branch block (LBBB), Hyponatremia





Cellulitis


Qualifiers:


 Site of cellulitis: extremity Site of cellulitis of extremity: lower extremity 

Laterality: right Qualified Code(s): L03.115 - Cellulitis of right lower limb





Sepsis


Qualifiers:


 Sepsis type: sepsis due to unspecified organism Sepsis acute organ dysfunction 

status: unspecified Qualified Code(s): A41.9 - Sepsis, unspecified organism





Renal failure


Qualifiers:


 Renal failure chronicity: unspecified chronicity Qualified Code(s): N19 - 

Unspecified kidney failure





Referrals: 


Pravin Horta MD [Primary Care Provider] - 


Forms:  ED Department Discharge





Sepsis Event Note (ED)





- Evaluation


Sepsis Screening Result: No Definite Risk





- Focused Exam


Vital Signs: 


                                   Vital Signs











  Temp Pulse Resp BP Pulse Ox


 


 06/17/20 11:22  97.5 F  117 H  20  96/75  97














- My Orders


Last 24 Hours: 


My Active Orders





06/17/20 11:39


Cardiac Monitoring [RC] .AS DIRECTED 


Sodium Chloride 0.9% [Saline Flush]   10 ml FLUSH ASDIRECTED PRN 


Peripheral IV Insertion Adult [OM.PC] Stat 





06/17/20 11:40


Oxygen Therapy [RC] PRN 





06/17/20 11:41


Peripheral IV Care [RC] .AS DIRECTED 





06/17/20 12:23


Blood Culture x2 Reflex Set [OM.PC] Stat 





06/17/20 12:40


CULTURE BLOOD [BC] Stat 


LACTIC ACID W/ REFLEX [LACTATE SEPSIS W/ REFLEX] [CHEM] Stat 





06/17/20 12:45


Sodium Chloride 0.9% [Normal Saline] 1,000 ml IV ASDIRECTED 


Vancomycin 1.5 gm   Sodium Chloride 0.9% [Normal Saline] 500 ml IV ONETIME 





06/17/20 13:00


CULTURE BLOOD [BC] Stat 


cefTRIAXone [Rocephin] 2 gm   Sodium Chloride 0.9% [Normal Saline] 100 ml IV 

Q24H 





06/17/20 13:07


CORONAVIRUS COVID-19 JAIDEN [MOLEC] Stat 





06/17/20 13:14


UA W/MICROSCOPIC [URIN] Stat 














- Assessment/Plan


Last 24 Hours: 


My Active Orders





06/17/20 11:39


Cardiac Monitoring [RC] .AS DIRECTED 


Sodium Chloride 0.9% [Saline Flush]   10 ml FLUSH ASDIRECTED PRN 


Peripheral IV Insertion Adult [OM.PC] Stat 





06/17/20 11:40


Oxygen Therapy [RC] PRN 





06/17/20 11:41


Peripheral IV Care [RC] .AS DIRECTED 





06/17/20 12:23


Blood Culture x2 Reflex Set [OM.PC] Stat 





06/17/20 12:40


CULTURE BLOOD [BC] Stat 


LACTIC ACID W/ REFLEX [LACTATE SEPSIS W/ REFLEX] [CHEM] Stat 





06/17/20 12:45


Sodium Chloride 0.9% [Normal Saline] 1,000 ml IV ASDIRECTED 


Vancomycin 1.5 gm   Sodium Chloride 0.9% [Normal Saline] 500 ml IV ONETIME 





06/17/20 13:00


CULTURE BLOOD [BC] Stat 


cefTRIAXone [Rocephin] 2 gm   Sodium Chloride 0.9% [Normal Saline] 100 ml IV 

Q24H 





06/17/20 13:07


CORONAVIRUS COVID-19 JAIDEN [MOLEC] Stat 





06/17/20 13:14


UA W/MICROSCOPIC [URIN] Stat

## 2020-06-17 NOTE — CR
Chest: AP view of the chest was obtained.

 

Comparison: Prior chest x-ray of 05/15/20.

 

Heart size and mediastinum are within normal limits for AP technique. 

 Lungs are clear with no acute parenchymal change.  Degenerative 

change is seen within both shoulders.  Mild scoliosis is present 

within the spine.

 

Impression:

1.  Findings as noted above.

2.  Nothing acute is appreciated on AP chest x-ray.

 

Diagnostic code #2

 

This report was dictated in MDT